# Patient Record
Sex: FEMALE | Race: WHITE | NOT HISPANIC OR LATINO | Employment: OTHER | URBAN - NONMETROPOLITAN AREA
[De-identification: names, ages, dates, MRNs, and addresses within clinical notes are randomized per-mention and may not be internally consistent; named-entity substitution may affect disease eponyms.]

---

## 2021-04-14 ENCOUNTER — HOSPITAL ENCOUNTER (OUTPATIENT)
Dept: RADIOLOGY | Facility: CLINIC | Age: 66
Discharge: HOME | End: 2021-04-14
Payer: COMMERCIAL

## 2021-04-14 ENCOUNTER — OFFICE VISIT (OUTPATIENT)
Dept: OBGYN CLINIC | Facility: CLINIC | Age: 66
End: 2021-04-14
Payer: COMMERCIAL

## 2021-04-14 VITALS
DIASTOLIC BLOOD PRESSURE: 66 MMHG | HEIGHT: 68 IN | SYSTOLIC BLOOD PRESSURE: 118 MMHG | BODY MASS INDEX: 39.71 KG/M2 | TEMPERATURE: 97.6 F | WEIGHT: 262 LBS | HEART RATE: 69 BPM

## 2021-04-14 DIAGNOSIS — G89.29 CHRONIC RIGHT SHOULDER PAIN: ICD-10-CM

## 2021-04-14 DIAGNOSIS — S46.011A ROTATOR CUFF STRAIN, RIGHT, INITIAL ENCOUNTER: ICD-10-CM

## 2021-04-14 DIAGNOSIS — M25.511 CHRONIC RIGHT SHOULDER PAIN: ICD-10-CM

## 2021-04-14 DIAGNOSIS — M75.01 ADHESIVE CAPSULITIS OF RIGHT SHOULDER: Primary | ICD-10-CM

## 2021-04-14 PROCEDURE — 99203 OFFICE O/P NEW LOW 30 MIN: CPT | Performed by: ORTHOPAEDIC SURGERY

## 2021-04-14 PROCEDURE — 73030 X-RAY EXAM OF SHOULDER: CPT

## 2021-04-14 RX ORDER — LISINOPRIL 2.5 MG/1
2.5 TABLET ORAL DAILY
COMMUNITY
End: 2021-07-02 | Stop reason: HOSPADM

## 2021-04-14 RX ORDER — ATENOLOL 25 MG/1
25 TABLET ORAL DAILY
COMMUNITY
End: 2021-07-02 | Stop reason: HOSPADM

## 2021-04-14 RX ORDER — ASPIRIN 325 MG
325 TABLET ORAL
COMMUNITY
End: 2021-07-02 | Stop reason: HOSPADM

## 2021-04-14 RX ORDER — NAPROXEN 500 MG/1
500 TABLET ORAL 2 TIMES DAILY WITH MEALS
Qty: 60 TABLET | Refills: 1 | Status: SHIPPED | OUTPATIENT
Start: 2021-04-14 | End: 2021-06-22 | Stop reason: ALTCHOICE

## 2021-04-14 RX ORDER — ALBUTEROL SULFATE 90 UG/1
2 AEROSOL, METERED RESPIRATORY (INHALATION) 4 TIMES DAILY
COMMUNITY
Start: 2021-03-16

## 2021-04-14 NOTE — PROGRESS NOTES
ASSESSMENT/PLAN:    Diagnoses and all orders for this visit:    Adhesive capsulitis of right shoulder  -     XR shoulder 2+ vw right; Future  -     Ambulatory referral to Physical Therapy; Future  -     naproxen (NAPROSYN) 500 mg tablet; Take 1 tablet (500 mg total) by mouth 2 (two) times a day with meals    Chronic right shoulder pain  -     Ambulatory referral to Physical Therapy; Future    Other orders  -     albuterol (PROVENTIL HFA,VENTOLIN HFA) 90 mcg/act inhaler; Inhale 2 puffs  -     aspirin 325 mg tablet; Take 325 mg by mouth  -     lisinopril (ZESTRIL) 2 5 mg tablet; Take 2 5 mg by mouth daily  -     atenolol (TENORMIN) 25 mg tablet; Take 25 mg by mouth daily      Reviewed x-ray findings and physical exam findings with patient, and informed her that today's findings are consistent with adhesive capsulitis of the right shoulder  She has been provided a referral to formal physical therapy to address pain, strength, and range of motion deficits  She has also been given a prescription for naproxen for relief of pain and inflammation  Educated patient that this particular pathology can be particularly uncomfortable, and some degree of pain as expected with therapy  She can continue ice, and home stretching program as tolerated  She can also continue activity as tolerated on the right upper extremity  She will be seen for follow-up in 4 weeks for strength and range of motion check     _____________________________________________________  CHIEF COMPLAINT:  Chief Complaint   Patient presents with    Right Shoulder - Pain         SUBJECTIVE:  Arnaldo Adkins is a 77y o  year old ambidextrous female who presents  For evaluation of right shoulder injury sustained 2/5/2021    Patient states that she slipped on ice outside of her home on the morning of February 5th, and was trying to right herself by pulling herself up on brick work outside of her home, when she felt a pop in the anterior aspect of her right shoulder  She reports having some mild swelling, difficulty with overhead motion initially following the incident  She denies any bruising, swelling, numbness, or tingling  She manage her symptoms at home, thinking that they would resolve on their own over time, but when they did not resolve she was seen by her primary care provider in regards to this issue  On today's presentation she complains of both anterior and deep right shoulder pain, that she describes as achy at rest but sharp with certain motions  Her pain is most bothersome with attempting overhead motion, or when reaching behind her back  She notes that she is no longer able to elevate her arm above her head, and has to use her opposite arm to assist with achieving overhead motion in the right upper extremity  She denies any current bruising, swelling, numbness, tingling, feelings of instability, or mechanical symptoms       PAST MEDICAL HISTORY:  Past Medical History:   Diagnosis Date    COPD (chronic obstructive pulmonary disease) ([facility] [address] )     High cholesterol        PAST SURGICAL HISTORY:  Past Surgical History:   Procedure Laterality Date    APPENDECTOMY         FAMILY HISTORY:  Family History   Problem Relation Age of Onset    No Known Problems Mother     No Known Problems Father        SOCIAL HISTORY:  Social History     Tobacco Use    Smoking status: Current Every Day Smoker     Packs/day: 0 50     Types: Cigarettes    Smokeless tobacco: Never Used    Tobacco comment: 30 pk yr hx   Substance Use Topics    Alcohol use: Yes     Comment: occasionally    Drug use: Never       MEDICATIONS:    Current Outpatient Medications:     albuterol (PROVENTIL HFA,VENTOLIN HFA) 90 mcg/act inhaler, Inhale 2 puffs, Disp: , Rfl:     aspirin 325 mg tablet, Take 325 mg by mouth, Disp: , Rfl:     atenolol (TENORMIN) 25 mg tablet, Take 25 mg by mouth daily, Disp: , Rfl:     lisinopril (ZESTRIL) 2 5 mg tablet, Take 2 5 mg by mouth daily, Disp: , Rfl:    naproxen (NAPROSYN) 500 mg tablet, Take 1 tablet (500 mg total) by mouth 2 (two) times a day with meals, Disp: 60 tablet, Rfl: 1    ALLERGIES:  No Known Allergies    Review of systems:   Constitutional: Negative for fatigue, fever or loss of apetite  HENT: Negative  Respiratory: Negative for shortness of breath, dyspnea  Cardiovascular: Negative for chest pain/tightness  Gastrointestinal: Negative for abdominal pain, N/V  Endocrine: Negative for cold/heat intolerance, unexplained weight loss/gain  Genitourinary: Negative for flank pain, dysuria, hematuria  Musculoskeletal: As noted in HPI   Skin: Negative for rash  Neurological:  Negative for numbness, tingling, or paresthesias  Psychiatric/Behavioral: Negative for agitation  _____________________________________________________  PHYSICAL EXAMINATION:    Blood pressure 118/66, pulse 69, temperature 97 6 °F (36 4 °C), temperature source Temporal, height 5' 8" (1 727 m), weight 119 kg (262 lb)  General: well developed and well nourished, alert, oriented times 3 and appears comfortable  Psychiatric: Normal  HEENT: Benign  Cardiovascular:  Normal rate    Pulmonary: No wheezing or stridor  Abdomen: Soft, Nontender  Skin: No masses, erythema, lacerations, fluctation, ulcerations  Neurovascular:  Palpable distal pulses, DTRs intact    MUSCULOSKELETAL EXAMINATION:  Right shoulder -     No obvious anatomical deformity  Skin is warm and dry to touch with no signs of erythema, ecchymosis, or infection   no soft tissue swelling or effusion noted   minimal palpable crepitus with passive motion   mildly TTP over anterior capsule   Non-tender over subacromial space, AC joint, or posterior capsule   Passive ROM  FF 60°, ABD  30°, ER  50°, IR  Left PSIS   Active ROM FF  80° with shoulder shrug, ABD  80° with shoulder shrug  MMT  4/5 external rotation, 4/5 internal rotation, 4/5 shoulder abduction   no glenohumeral instability appreciated on exam  -  Hook Sign  - [place] deformity  She had no complaints of any change in her baseline pain with Leroy's, Pryor's, speed's testing  Demonstrates normal elbow, wrist, and finger motion  2+ distal radial pulse with brisk capillary refill to the fingers  Radial, median, and ulnar motor and sensory distributions intact  Sensation light touch intact distally    _____________________________________________________  STUDIES REVIEWED:   Attending Physician has personally reviewed pertinent imaging in PACS, impression is as follows:    Review of radiographic series taken  4/14/2021 of the  Right shoulder shows  Mild to moderate AC joint osteoarthritis  There is no significant degenerative change noted in the glenohumeral joint        Scribe Attestation    I,:  Cassandra Bautista am acting as a scribe while in the presence of the attending physician :       I,:  Ambrosio Salazar personally performed the services described in this documentation    as scribed in my presence :

## 2021-04-20 ENCOUNTER — EVALUATION (OUTPATIENT)
Dept: PHYSICAL THERAPY | Facility: CLINIC | Age: 66
End: 2021-04-20
Payer: COMMERCIAL

## 2021-04-20 DIAGNOSIS — M25.511 CHRONIC RIGHT SHOULDER PAIN: ICD-10-CM

## 2021-04-20 DIAGNOSIS — M75.01 ADHESIVE CAPSULITIS OF RIGHT SHOULDER: ICD-10-CM

## 2021-04-20 DIAGNOSIS — G89.29 CHRONIC RIGHT SHOULDER PAIN: ICD-10-CM

## 2021-04-20 PROCEDURE — 97535 SELF CARE MNGMENT TRAINING: CPT | Performed by: PHYSICAL THERAPIST

## 2021-04-20 PROCEDURE — 97162 PT EVAL MOD COMPLEX 30 MIN: CPT | Performed by: PHYSICAL THERAPIST

## 2021-04-20 NOTE — PROGRESS NOTES
PT Evaluation   Today's date: 2021  Patient name: Francie Santamaria  : 1955  MRN: [id number]  Referring provider: Mery Chaudhary DO  Dx:   Encounter Diagnosis     ICD-10-CM    1  Chronic right shoulder pain  M25 511 Ambulatory referral to Physical Therapy    G89 29    2  Adhesive capsulitis of right shoulder  M75 01 Ambulatory referral to Physical Therapy     Assessment  Assessment details:  2021  Raymond Villanueva states she was at home and she got up early in the morning  She slipped and fell on the ice outside at 5:30 AM   She had gone outside to get the newspaper  She injured her right shoulder on impact  She developed pain with her humeral region  Impairments: abnormal or restricted ROM, abnormal movement, activity intolerance, impaired physical strength, lacks appropriate home exercise program, pain with function, safety issue, scapular dyskinesis and weight-bearing intolerance  Understanding of Dx/Px/POC: excellent   Prognosis: good    Goals  STG 2-4 weeks:   Increase UE strength by 3-6 lbs  Decrease pain by 1-2 levels on 1-10 pain scale  Increase UE PROM by 10-15 Degrees in all planes  Reduce C/O pain with lying on either side  Initiate HEP  LTG 6-8 weeks:   Increase UE strength 10-20 lbs  Demonstrate UE AROM WFL in all planes  Decrease pain to <2  Improve strength by 10-20 lbs  Return to lying on their right or left side with minimal difficulty  Improve sleep status limitations to none  D/C with HEP  Plan  Plan details: All planned modality interventions and planned therapy interventions are provided PRN    Patient would benefit from: PT eval and skilled physical therapy  Planned modality interventions: ultrasound, unattended electrical stimulation and TENS  Planned therapy interventions: joint mobilization, manual therapy, neuromuscular re-education, patient education, self care, strengthening, stretching, therapeutic activities, coordination, flexibility, graded exercise, home exercise program, therapeutic exercise and therapeutic training  Frequency: 3x week  Duration in weeks: 12  Treatment plan discussed with: patient        Subjective Evaluation    Pain  Quality: discomfort, knife-like, pulling, squeezing, sharp, tight and throbbing  Relieving factors: support, rest, relaxation and change in position  Aggravating factors: overhead activity, keyboarding and lifting  Progression: worsening    Treatments  Current treatment: physical therapy  Patient Goals  Patient goals for therapy: decreased pain, increased motion, return to work, return to [employer] activities, independence with ADLs/IADLs and increased strength          Objective    Date of onset:  2/7/2021    Date of Surgery:  None    History of Present Episode: 4/20/2021  Edgar Fong states she was at home and she got up early in the morning  She slipped and fell on the ice outside at 5:30 AM   She had gone outside to get the newspaper  She injured her right shoulder on impact  She developed pain with her humeral region  Past Medical History:    4/20/2021  Edgar Fong reports COPD  Previous Level of Functional Ability:  4/20/2021  Edgar Fong states she had no issues or limitations  Inspection / Palpation:  Shoulder:  4/20/2021  Endomorphic body type  No signs of infection  No signs of wounds  No signs of drainage  No signs of ecchymotic regions  No signs of erythremic regions  Moderate signs of muscle spasm  Moderate signs of muscle guarding  Moderate signs of tenderness reported to palpation  No signs of atrophy noted  No signs of swelling  No signs of a surgery site  Current conditions appears consistent with recent acute episode  Chief Complaints:  4/20/2021  Medina reports moderate to severe difficulty with bending her right shoulder  Edgar Fong reports moderate to severe difficulty with movement of her right shoulder  Edgar Fong reports moderate to severe difficulty with use of her right arm    Edgar Fong reports severe difficulty with lifting / elevating her right arm  Jyotsna Ashby reports mild to moderate difficulty with sleeping  Jyotsna Ashby reports mild to moderate difficulty with her strength and endurance  Jyotsna Ashby reports moderate to severe limitations with her right shoulder range of motion  Jyotsna Ashby reports mild to moderate difficulty lying on her right shoulder region  SHOULDER PAIN Resting Palpation Moving Lifting Elevating   4/20/2021 Lt 0 0 0 0 0   4/20/2021 Rt  0 0 0-4 0-4 5-10     SHOULDER PAIN Sleeping Throwing Pushing Pulling Twisting   4/20/2021 Lt 0 0 0 0 0   4/20/2021 Rt  0 5-10 0-4 0-4 5-10     SHOULDER AROM Flexion Extension Abduction   4/20/2021 Lt 185° 70° 185°   4/20/2021 Rt 85° 32° 85°     SHOULDER AROM Hor Add Ext Rotation Int Rotation   4/20/2021 Lt 70° 95° 95°   4/20/2021 Rt 15° 25° 95°     SHLD MMT / PAIN Flexion Extension Abduction   4/20/2021 Lt 0/10  32 lbs 0/10  30 lbs 0/10  29 lbs   4/20/2021 Rt 5/10  14 lbs 5/10  10 lbs 5/10  9 lbs     SHLD MMT / PAIN Hor Add Ext Rotation Int Rotation   4/20/2021 Lt 0/10  28 lbs 0/10  30 lbs 0/10  34 lbs   4/20/2021 Rt 5/10  14 lbs 5/10  6 lbs 5/10  9 lbs     Shoulder Screen Rotator Cuff Apprehension Anterior  Stability Posterior  Stability   4/20/2021 Rt POSITIVE Negative Negative Negative   4/20/2021 Lt Negative Negative Negative Negative     Shoulder Screen AC Joint SC Joint Drop Arm Adhesive Capsulitis   4/20/2021 Rt Negative Negative POSITIVE POSITIVE   4/20/2021 Lt Negative Negative Negative Negative     Precautions:  Right Shoulder Humeral Pain / Right Capsulitis    All treatments below will be provided with a focus on strengthening, flexibility, ROM, postural,   endurance and any possible swelling and pain which may be present without ignoring   neural issues involving balance, coordination and proprioception which is also important   and necessary to provide full functional mobility and quality care        Daily Treatment Log  Manual  4/20       MT, ROM        HEP 15' Neuro Re-Ed 4/20       [facility]        [employer]        Power Web        Digiflex        Finger Ladder        Ther Exer        [employer]        NuStep        W/P-PNF,IR,ER,PU,PS,Throw-Top,Mid,Bot        W/P-OH        [employer] Sup/Pro        [employer] [place], [place]                Modalities 4/20       [place]  / [place] / Judd GREEN

## 2021-04-20 NOTE — LETTER
May 7, 2021  PT Evaluation Plan of [address] Mina Mckinney DO  [address]  [address] [facility] [address]    Patient: Gonzalo Winters   YOB: 1955   Date of Visit: 2021     Encounter Diagnosis     ICD-10-CM    1  Chronic right shoulder pain  M25 511 Ambulatory referral to Physical Therapy    G89 29    2  Adhesive capsulitis of right shoulder  M75 01 Ambulatory referral to Physical Therapy       Dear Dr Judy Petty:    Thank you for your recent referral of Gonzalo Winters  Please review the attached evaluation summary from [facility] recent visit  Please verify that you agree with the plan of care by signing the attached order  If you have any questions or concerns, please do not hesitate to call  I sincerely appreciate the opportunity to share in the care of one of your patients and hope to have another opportunity to work with you in the near future  Sincerely,    Mina Mckeon, PT      Referring Provider:      I certify that I have read the below Plan of Care and certify the need for these services furnished under this plan of treatment while under my care  Josephine Otto DO  [address] [address]  [place] [place]          PT Evaluation   Today's date: 2021  Patient name: Milo Santamaria  : 1955  MRN: [id number]  Referring provider: Alexx Gilliam DO  Dx:   Encounter Diagnosis     ICD-10-CM    1  Chronic right shoulder pain  M25 511 Ambulatory referral to Physical Therapy    G89 29    2  Adhesive capsulitis of right shoulder  M75 01 Ambulatory referral to Physical Therapy     Assessment  Assessment details:  2021  Hollieharis Kira states she was at home and she got up early in the morning  She slipped and fell on the ice outside at 5:30 AM   She had gone outside to get the newspaper  She injured her right shoulder on impact  She developed pain with her humeral region      Impairments: abnormal or restricted ROM, abnormal movement, activity intolerance, impaired physical strength, lacks appropriate home exercise program, pain with function, safety issue, scapular dyskinesis and weight-bearing intolerance  Understanding of Dx/Px/POC: excellent   Prognosis: good    Goals  STG 2-4 weeks:   Increase UE strength by 3-6 lbs  Decrease pain by 1-2 levels on 1-10 pain scale  Increase UE PROM by 10-15 Degrees in all planes  Reduce C/O pain with lying on either side  Initiate HEP  LTG 6-8 weeks:   Increase UE strength 10-20 lbs  Demonstrate UE AROM WFL in all planes  Decrease pain to <2  Improve strength by 10-20 lbs  Return to lying on their right or left side with minimal difficulty  Improve sleep status limitations to none  D/C with HEP  Plan  Plan details: All planned modality interventions and planned therapy interventions are provided PRN    Patient would benefit from: PT eval and skilled physical therapy  Planned modality interventions: ultrasound, unattended electrical stimulation and TENS  Planned therapy interventions: joint mobilization, manual therapy, neuromuscular re-education, patient education, self care, strengthening, stretching, therapeutic activities, coordination, flexibility, graded exercise, home exercise program, therapeutic exercise and therapeutic training  Frequency: 3x week  Duration in weeks: 12  Treatment plan discussed with: patient        Subjective Evaluation    Pain  Quality: discomfort, knife-like, pulling, squeezing, sharp, tight and throbbing  Relieving factors: support, rest, relaxation and change in position  Aggravating factors: overhead activity, keyboarding and lifting  Progression: worsening    Treatments  Current treatment: physical therapy  Patient Goals  Patient goals for therapy: decreased pain, increased motion, return to work, return to [employer] activities, independence with ADLs/IADLs and increased strength          Objective    Date of onset:  2/7/2021    Date of Surgery:  None    History of Present Episode: 4/20/2021  [facility] states she was at home and she got up early in the morning  She slipped and fell on the ice outside at 5:30 AM   She had gone outside to get the newspaper  She injured her right shoulder on impact  She developed pain with her humeral region  Past Medical History:    4/20/2021  [facility] reports COPD  Previous Level of Functional Ability:  4/20/2021  [facility] states she had no issues or limitations  Inspection / Palpation:  Shoulder:  4/20/2021  Endomorphic body type  No signs of infection  No signs of wounds  No signs of drainage  No signs of ecchymotic regions  No signs of erythremic regions  Moderate signs of muscle spasm  Moderate signs of muscle guarding  Moderate signs of tenderness reported to palpation  No signs of atrophy noted  No signs of swelling  No signs of a surgery site  Current conditions appears consistent with recent acute episode  Chief Complaints:  4/20/2021  Medina reports moderate to severe difficulty with bending her right shoulder  [facility] reports moderate to severe difficulty with movement of her right shoulder  [facility] reports moderate to severe difficulty with use of her right arm  [facility] reports severe difficulty with lifting / elevating her right arm  [facility] reports mild to moderate difficulty with sleeping  [facility] reports mild to moderate difficulty with her strength and endurance  [facility] reports moderate to severe limitations with her right shoulder range of motion  [facility] reports mild to moderate difficulty lying on her right shoulder region      SHOULDER PAIN Resting Palpation Moving Lifting Elevating   4/20/2021 Lt 0 0 0 0 0   4/20/2021 Rt  0 0 0-4 0-4 5-10     SHOULDER PAIN Sleeping Throwing Pushing Pulling Twisting   4/20/2021 Lt 0 0 0 0 0   4/20/2021 Rt  0 5-10 0-4 0-4 5-10     SHOULDER AROM Flexion Extension Abduction   4/20/2021 Lt 185° 70° 185°   4/20/2021 Rt 85° 32° 85°     SHOULDER AROM Hor Add Ext Rotation Int Rotation   2021 Lt 70° 95° 95°   2021 Rt 15° 25° 95°     SHLD MMT / PAIN Flexion Extension Abduction   2021 Lt 0/10  32 lbs 0/10  30 lbs 0/10  29 lbs   2021 Rt 5/10  14 lbs 5/10  10 lbs 5/10  9 lbs     SHLD MMT / PAIN Hor Add Ext Rotation Int Rotation   2021 Lt 0/10  28 lbs 0/10  30 lbs 0/10  34 lbs   2021 Rt 510  14 lbs 5/10  6 lbs 5/10  9 lbs     Shoulder Screen Rotator Cuff Apprehension Anterior  Stability Posterior  Stability   2021 Rt POSITIVE Negative Negative Negative   2021 Lt Negative Negative Negative Negative     Shoulder Screen AC Joint SC Joint Drop Arm Adhesive Capsulitis   2021 Rt Negative Negative POSITIVE POSITIVE   2021 Lt Negative Negative Negative Negative     Precautions:  Right Shoulder Humeral Pain / Right Capsulitis    All treatments below will be provided with a focus on strengthening, flexibility, ROM, postural,   endurance and any possible swelling and pain which may be present without ignoring   neural issues involving balance, coordination and proprioception which is also important   and necessary to provide full functional mobility and quality care  Daily Treatment Log  Manual         MT, ROM        HEP 15'       Neuro Re-Ed        [facility]        [facility]        [facility]        Power Web        Digiflex        Finger Ladder        Ther Exer        [employer]ain[employer]-BP,PD,Lats,Row        NuStep        W/P-PNF,IR,ER,PU,PS,Throw-Top,Mid,Bot        W/P-OH        [employer] Sup/Pro        [employer] [place], [place]                Modalities        [address] / [place] / [place]        US            PT Discharge  Today's date: 2021  Patient name: Silvia Santamaria  : 1955  MRN: [id number]  Referring provider: Bhumika Norman DO  Dx:   Encounter Diagnosis     ICD-10-CM    1  Chronic right shoulder pain  M25 511 Ambulatory referral to Physical Therapy    G89 29 PT plan of care cert/re-cert   2   Adhesive capsulitis of right shoulder  M75 01 Ambulatory referral to Physical Therapy     PT plan of care cert/re-cert     Assessment/Plan    Subjective    Objective     5/7/2021  Medina Marmolejo has not returned for more treatment  Manjeet Kaplan did not attend today's appointment  I cannot provide you with a current progress report but I can provide you with information based on previous performance  Manjeet Kaplan is discharged at this time

## 2021-04-27 ENCOUNTER — APPOINTMENT (OUTPATIENT)
Dept: PHYSICAL THERAPY | Facility: CLINIC | Age: 66
End: 2021-04-27
Payer: COMMERCIAL

## 2021-05-07 NOTE — PROGRESS NOTES
PT Discharge  Today's date: 2021  Patient name: Valerie Santamaria  : 1955  MRN: [id number]  Referring provider: Rashawn Willett DO  Dx:   Encounter Diagnosis     ICD-10-CM    1  Chronic right shoulder pain  M25 511 Ambulatory referral to Physical Therapy    G89 29 PT plan of care cert/re-cert   2  Adhesive capsulitis of right shoulder  M75 01 Ambulatory referral to Physical Therapy     PT plan of care cert/re-cert     Assessment/Plan    Subjective    Objective     2021  Medina Sharp has not returned for more treatment  Heavenly Mckeon did not attend today's appointment  I cannot provide you with a current progress report but I can provide you with information based on previous performance  Humbertodavid Mckeon is discharged at this time

## 2021-05-12 ENCOUNTER — OFFICE VISIT (OUTPATIENT)
Dept: OBGYN CLINIC | Facility: CLINIC | Age: 66
End: 2021-05-12
Payer: COMMERCIAL

## 2021-05-12 VITALS
RESPIRATION RATE: 20 BRPM | HEART RATE: 72 BPM | BODY MASS INDEX: 39.71 KG/M2 | TEMPERATURE: 97.4 F | SYSTOLIC BLOOD PRESSURE: 132 MMHG | WEIGHT: 262 LBS | DIASTOLIC BLOOD PRESSURE: 78 MMHG | HEIGHT: 68 IN

## 2021-05-12 DIAGNOSIS — M75.01 ADHESIVE CAPSULITIS OF RIGHT SHOULDER: Primary | ICD-10-CM

## 2021-05-12 PROCEDURE — 99213 OFFICE O/P EST LOW 20 MIN: CPT | Performed by: ORTHOPAEDIC SURGERY

## 2021-05-12 NOTE — PROGRESS NOTES
ASSESSMENT/PLAN:    Diagnoses and all orders for this visit:    Adhesive capsulitis of right shoulder        Wesly Hurtado will continue home exercises  She may also continue naproxen as needed for pain control  She was offered right shoulder CSI but declined, stating she would rather continue working on home exercises  We will see her back on an as needed basis  She was encouraged to contact the office should questions or concerns arise  Return if symptoms worsen or fail to improve       _____________________________________________________  CHIEF COMPLAINT:  Chief Complaint   Patient presents with    Right Shoulder - Follow-up    Shoulder Injury         SUBJECTIVE:  Renetta Hobson is a 77 y o  female who presents for follow up of right shoulder adhesive capsulitis  She reports minimal improvement in the past 4 weeks  She only went to an evaluation with therapy and was told that she would be required to pay $100 per visit  She is unable to afford this and was provided 3 pages of home exercises by Jono  She has been performing these exercises daily  She has been taking the naproxen routinely and also notes relief  She denies numbness/tingling  Denies new injuries or trauma         PAST MEDICAL HISTORY:  Past Medical History:   Diagnosis Date    COPD (chronic obstructive pulmonary disease) ([facility] [address] )     High cholesterol        PAST SURGICAL HISTORY:  Past Surgical History:   Procedure Laterality Date    APPENDECTOMY         FAMILY HISTORY:  Family History   Problem Relation Age of Onset    No Known Problems Mother     No Known Problems Father        SOCIAL HISTORY:  Social History     Tobacco Use    Smoking status: Current Every Day Smoker     Packs/day: 0 50     Types: Cigarettes    Smokeless tobacco: Never Used    Tobacco comment: 30 pk yr hx   Substance Use Topics    Alcohol use: Yes     Comment: occasionally    Drug use: Never       MEDICATIONS:    Current Outpatient Medications:     albuterol (PROVENTIL HFA,VENTOLIN HFA) 90 mcg/act inhaler, Inhale 2 puffs, Disp: , Rfl:     aspirin 325 mg tablet, Take 325 mg by mouth, Disp: , Rfl:     atenolol (TENORMIN) 25 mg tablet, Take 25 mg by mouth daily, Disp: , Rfl:     lisinopril (ZESTRIL) 2 5 mg tablet, Take 2 5 mg by mouth daily, Disp: , Rfl:     naproxen (NAPROSYN) 500 mg tablet, Take 1 tablet (500 mg total) by mouth 2 (two) times a day with meals, Disp: 60 tablet, Rfl: 1    ALLERGIES:  No Known Allergies    REVIEW OF SYSTEMS:  Pertinent items are noted in HPI  A comprehensive review of systems was negative       _____________________________________________________  PHYSICAL EXAMINATION:  General: well developed and well nourished, alert, oriented times 3 and appears comfortable  Psychiatric: Normal  HEENT:  Normocephalic, atraumatic  Cardiovascular:  Regular  Pulmonary: No wheezing or stridor  Skin: No masses, erthema, lacerations, fluctation, ulcerations  Neurovascular: Motor and sensory exams are grossly intact  +2 Radial pulse  Limb is warm and well perfused with good color and capillary refill of the digits  MUSCULOSKELETAL EXAMINATION:    The right shoulder exam demonstrates skin intact, no erythema, no ecchymosis  No tenderness to palpation  She has active FF to 90 degrees, active abduction to 80 degrees, internal rotatio to her right buttock, and ER to 60 degrees  4/5 strength with resisted FF, IR, and ER  Biceps provocation testing does not elicit any complaints  The remainder of the right upper extremity exam is benign  _____________________________________________________  STUDIES REVIEWED:  No new imaging performed today    PROCEDURES PERFORMED:   Procedures  None performed today            Mendy Lopez PA-C

## 2021-06-22 ENCOUNTER — APPOINTMENT (EMERGENCY)
Dept: RADIOLOGY | Facility: HOSPITAL | Age: 66
DRG: 644 | End: 2021-06-22
Payer: COMMERCIAL

## 2021-06-22 ENCOUNTER — APPOINTMENT (INPATIENT)
Dept: ULTRASOUND IMAGING | Facility: HOSPITAL | Age: 66
DRG: 644 | End: 2021-06-22
Payer: COMMERCIAL

## 2021-06-22 ENCOUNTER — HOSPITAL ENCOUNTER (INPATIENT)
Facility: HOSPITAL | Age: 66
LOS: 10 days | Discharge: HOME | DRG: 644 | End: 2021-07-02
Attending: STUDENT IN AN ORGANIZED HEALTH CARE EDUCATION/TRAINING PROGRAM | Admitting: FAMILY MEDICINE
Payer: COMMERCIAL

## 2021-06-22 DIAGNOSIS — R79.89 ELEVATED BRAIN NATRIURETIC PEPTIDE (BNP) LEVEL: ICD-10-CM

## 2021-06-22 DIAGNOSIS — D72.829 LEUKOCYTOSIS: ICD-10-CM

## 2021-06-22 DIAGNOSIS — R60.0 BILATERAL LOWER EXTREMITY EDEMA: ICD-10-CM

## 2021-06-22 DIAGNOSIS — E05.90 HYPERTHYROIDISM: ICD-10-CM

## 2021-06-22 DIAGNOSIS — R79.89 LOW TSH LEVEL: ICD-10-CM

## 2021-06-22 DIAGNOSIS — I48.91 NEW ONSET A-FIB (HCC): ICD-10-CM

## 2021-06-22 DIAGNOSIS — N17.9 AKI (ACUTE KIDNEY INJURY) (HCC): ICD-10-CM

## 2021-06-22 DIAGNOSIS — I48.91 ATRIAL FIBRILLATION WITH RAPID VENTRICULAR RESPONSE (HCC): Primary | ICD-10-CM

## 2021-06-22 DIAGNOSIS — R60.0 BILATERAL LEG EDEMA: ICD-10-CM

## 2021-06-22 PROBLEM — E87.29 INCREASED ANION GAP METABOLIC ACIDOSIS: Status: ACTIVE | Noted: 2021-06-22

## 2021-06-22 PROBLEM — E87.2 INCREASED ANION GAP METABOLIC ACIDOSIS: Status: ACTIVE | Noted: 2021-06-22

## 2021-06-22 PROBLEM — Z72.0 TOBACCO ABUSE: Status: ACTIVE | Noted: 2021-06-22

## 2021-06-22 LAB
ALBUMIN SERPL BCP-MCNC: 2.8 G/DL (ref 3.5–5)
ALBUMIN SERPL BCP-MCNC: 3.1 G/DL (ref 3.5–5)
ALP SERPL-CCNC: 70 U/L (ref 46–116)
ALP SERPL-CCNC: 72 U/L (ref 46–116)
ALT SERPL W P-5'-P-CCNC: 13 U/L (ref 12–78)
ALT SERPL W P-5'-P-CCNC: 15 U/L (ref 12–78)
ANION GAP SERPL CALCULATED.3IONS-SCNC: 15 MMOL/L (ref 4–13)
ANION GAP SERPL CALCULATED.3IONS-SCNC: 16 MMOL/L (ref 4–13)
APTT PPP: 34 SECONDS (ref 23–37)
AST SERPL W P-5'-P-CCNC: 11 U/L (ref 5–45)
AST SERPL W P-5'-P-CCNC: 9 U/L (ref 5–45)
BASOPHILS # BLD AUTO: 0.04 THOUSANDS/ΜL (ref 0–0.1)
BASOPHILS NFR BLD AUTO: 0 % (ref 0–1)
BILIRUB SERPL-MCNC: 0.79 MG/DL (ref 0.2–1)
BILIRUB SERPL-MCNC: 0.85 MG/DL (ref 0.2–1)
BUN SERPL-MCNC: 48 MG/DL (ref 5–25)
BUN SERPL-MCNC: 54 MG/DL (ref 5–25)
CALCIUM ALBUM COR SERPL-MCNC: 10 MG/DL (ref 8.3–10.1)
CALCIUM ALBUM COR SERPL-MCNC: 10.2 MG/DL (ref 8.3–10.1)
CALCIUM SERPL-MCNC: 9 MG/DL (ref 8.3–10.1)
CALCIUM SERPL-MCNC: 9.5 MG/DL (ref 8.3–10.1)
CHLORIDE SERPL-SCNC: 107 MMOL/L (ref 100–108)
CHLORIDE SERPL-SCNC: 107 MMOL/L (ref 100–108)
CO2 SERPL-SCNC: 17 MMOL/L (ref 21–32)
CO2 SERPL-SCNC: 19 MMOL/L (ref 21–32)
CREAT SERPL-MCNC: 1.52 MG/DL (ref 0.6–1.3)
CREAT SERPL-MCNC: 1.8 MG/DL (ref 0.6–1.3)
EOSINOPHIL # BLD AUTO: 0 THOUSAND/ΜL (ref 0–0.61)
EOSINOPHIL NFR BLD AUTO: 0 % (ref 0–6)
ERYTHROCYTE [DISTWIDTH] IN BLOOD BY AUTOMATED COUNT: 13.8 % (ref 11.6–15.1)
GFR SERPL CREATININE-BSD FRML MDRD: 29 ML/MIN/1.73SQ M
GFR SERPL CREATININE-BSD FRML MDRD: 35 ML/MIN/1.73SQ M
GLUCOSE SERPL-MCNC: 124 MG/DL (ref 65–140)
GLUCOSE SERPL-MCNC: 136 MG/DL (ref 65–140)
HCT VFR BLD AUTO: 40.2 % (ref 34.8–46.1)
HGB BLD-MCNC: 13.2 G/DL (ref 11.5–15.4)
IMM GRANULOCYTES # BLD AUTO: 0.08 THOUSAND/UL (ref 0–0.2)
IMM GRANULOCYTES NFR BLD AUTO: 1 % (ref 0–2)
INR PPP: 2.38 (ref 0.84–1.19)
LACTATE SERPL-SCNC: 1.8 MMOL/L (ref 0.5–2)
LYMPHOCYTES # BLD AUTO: 1.42 THOUSANDS/ΜL (ref 0.6–4.47)
LYMPHOCYTES NFR BLD AUTO: 10 % (ref 14–44)
MAGNESIUM SERPL-MCNC: 1.9 MG/DL (ref 1.6–2.6)
MCH RBC QN AUTO: 30.6 PG (ref 26.8–34.3)
MCHC RBC AUTO-ENTMCNC: 32.8 G/DL (ref 31.4–37.4)
MCV RBC AUTO: 93 FL (ref 82–98)
MONOCYTES # BLD AUTO: 1.29 THOUSAND/ΜL (ref 0.17–1.22)
MONOCYTES NFR BLD AUTO: 9 % (ref 4–12)
NEUTROPHILS # BLD AUTO: 11.55 THOUSANDS/ΜL (ref 1.85–7.62)
NEUTS SEG NFR BLD AUTO: 80 % (ref 43–75)
NRBC BLD AUTO-RTO: 0 /100 WBCS
NT-PROBNP SERPL-MCNC: ABNORMAL PG/ML
PLATELET # BLD AUTO: 194 THOUSANDS/UL (ref 149–390)
PMV BLD AUTO: 10.7 FL (ref 8.9–12.7)
POTASSIUM SERPL-SCNC: 4 MMOL/L (ref 3.5–5.3)
POTASSIUM SERPL-SCNC: 4.3 MMOL/L (ref 3.5–5.3)
PROT SERPL-MCNC: 6.6 G/DL (ref 6.4–8.2)
PROT SERPL-MCNC: 7.1 G/DL (ref 6.4–8.2)
PROTHROMBIN TIME: 25.4 SECONDS (ref 11.6–14.5)
RBC # BLD AUTO: 4.31 MILLION/UL (ref 3.81–5.12)
SODIUM SERPL-SCNC: 139 MMOL/L (ref 136–145)
SODIUM SERPL-SCNC: 142 MMOL/L (ref 136–145)
TROPONIN I SERPL-MCNC: <0.02 NG/ML
WBC # BLD AUTO: 14.38 THOUSAND/UL (ref 4.31–10.16)

## 2021-06-22 PROCEDURE — 99285 EMERGENCY DEPT VISIT HI MDM: CPT

## 2021-06-22 PROCEDURE — 96376 TX/PRO/DX INJ SAME DRUG ADON: CPT

## 2021-06-22 PROCEDURE — 83735 ASSAY OF MAGNESIUM: CPT | Performed by: FAMILY MEDICINE

## 2021-06-22 PROCEDURE — 85610 PROTHROMBIN TIME: CPT | Performed by: PHYSICIAN ASSISTANT

## 2021-06-22 PROCEDURE — 99223 1ST HOSP IP/OBS HIGH 75: CPT | Performed by: FAMILY MEDICINE

## 2021-06-22 PROCEDURE — 83880 ASSAY OF NATRIURETIC PEPTIDE: CPT | Performed by: PHYSICIAN ASSISTANT

## 2021-06-22 PROCEDURE — 96361 HYDRATE IV INFUSION ADD-ON: CPT

## 2021-06-22 PROCEDURE — 84484 ASSAY OF TROPONIN QUANT: CPT | Performed by: PHYSICIAN ASSISTANT

## 2021-06-22 PROCEDURE — 36415 COLL VENOUS BLD VENIPUNCTURE: CPT | Performed by: PHYSICIAN ASSISTANT

## 2021-06-22 PROCEDURE — 71045 X-RAY EXAM CHEST 1 VIEW: CPT

## 2021-06-22 PROCEDURE — 85025 COMPLETE CBC W/AUTO DIFF WBC: CPT | Performed by: PHYSICIAN ASSISTANT

## 2021-06-22 PROCEDURE — 76536 US EXAM OF HEAD AND NECK: CPT

## 2021-06-22 PROCEDURE — 96375 TX/PRO/DX INJ NEW DRUG ADDON: CPT

## 2021-06-22 PROCEDURE — 99285 EMERGENCY DEPT VISIT HI MDM: CPT | Performed by: PHYSICIAN ASSISTANT

## 2021-06-22 PROCEDURE — 96374 THER/PROPH/DIAG INJ IV PUSH: CPT

## 2021-06-22 PROCEDURE — 85730 THROMBOPLASTIN TIME PARTIAL: CPT | Performed by: PHYSICIAN ASSISTANT

## 2021-06-22 PROCEDURE — 83605 ASSAY OF LACTIC ACID: CPT | Performed by: PHYSICIAN ASSISTANT

## 2021-06-22 PROCEDURE — 93005 ELECTROCARDIOGRAM TRACING: CPT

## 2021-06-22 PROCEDURE — 80053 COMPREHEN METABOLIC PANEL: CPT | Performed by: PHYSICIAN ASSISTANT

## 2021-06-22 PROCEDURE — 80053 COMPREHEN METABOLIC PANEL: CPT | Performed by: FAMILY MEDICINE

## 2021-06-22 RX ORDER — METOPROLOL TARTRATE 5 MG/5ML
5 INJECTION INTRAVENOUS ONCE
Status: COMPLETED | OUTPATIENT
Start: 2021-06-22 | End: 2021-06-22

## 2021-06-22 RX ORDER — DOCUSATE SODIUM 100 MG/1
100 CAPSULE, LIQUID FILLED ORAL 2 TIMES DAILY
Status: DISCONTINUED | OUTPATIENT
Start: 2021-06-22 | End: 2021-07-02 | Stop reason: HOSPADM

## 2021-06-22 RX ORDER — ONDANSETRON 2 MG/ML
4 INJECTION INTRAMUSCULAR; INTRAVENOUS EVERY 6 HOURS PRN
Status: DISCONTINUED | OUTPATIENT
Start: 2021-06-22 | End: 2021-07-02 | Stop reason: HOSPADM

## 2021-06-22 RX ORDER — SODIUM BICARBONATE 650 MG/1
650 TABLET ORAL ONCE
Status: COMPLETED | OUTPATIENT
Start: 2021-06-22 | End: 2021-06-22

## 2021-06-22 RX ORDER — DIGOXIN 0.25 MG/ML
250 INJECTION INTRAMUSCULAR; INTRAVENOUS ONCE
Status: COMPLETED | OUTPATIENT
Start: 2021-06-22 | End: 2021-06-22

## 2021-06-22 RX ORDER — FLUTICASONE PROPIONATE 110 UG/1
2 AEROSOL, METERED RESPIRATORY (INHALATION) 2 TIMES DAILY
COMMUNITY
Start: 2021-06-21

## 2021-06-22 RX ORDER — METOPROLOL TARTRATE 5 MG/5ML
5 INJECTION INTRAVENOUS EVERY 6 HOURS PRN
Status: DISCONTINUED | OUTPATIENT
Start: 2021-06-22 | End: 2021-07-02 | Stop reason: HOSPADM

## 2021-06-22 RX ORDER — METOPROLOL TARTRATE 5 MG/5ML
5 INJECTION INTRAVENOUS
Status: DISCONTINUED | OUTPATIENT
Start: 2021-06-22 | End: 2021-06-22

## 2021-06-22 RX ORDER — METHIMAZOLE 5 MG/1
5 TABLET ORAL DAILY
Status: DISCONTINUED | OUTPATIENT
Start: 2021-06-22 | End: 2021-06-28

## 2021-06-22 RX ORDER — ALPRAZOLAM 0.5 MG/1
0.5 TABLET ORAL 3 TIMES DAILY PRN
Status: DISCONTINUED | OUTPATIENT
Start: 2021-06-22 | End: 2021-07-02 | Stop reason: HOSPADM

## 2021-06-22 RX ORDER — METOPROLOL SUCCINATE 50 MG/1
50 TABLET, EXTENDED RELEASE ORAL
COMMUNITY
Start: 2021-06-21 | End: 2021-07-02 | Stop reason: HOSPADM

## 2021-06-22 RX ORDER — FLUTICASONE PROPIONATE 110 UG/1
2 AEROSOL, METERED RESPIRATORY (INHALATION) EVERY 12 HOURS SCHEDULED
Status: DISCONTINUED | OUTPATIENT
Start: 2021-06-22 | End: 2021-07-02 | Stop reason: HOSPADM

## 2021-06-22 RX ORDER — NICOTINE 21 MG/24HR
1 PATCH, TRANSDERMAL 24 HOURS TRANSDERMAL DAILY
Status: DISCONTINUED | OUTPATIENT
Start: 2021-06-22 | End: 2021-07-02 | Stop reason: HOSPADM

## 2021-06-22 RX ORDER — METOPROLOL TARTRATE 5 MG/5ML
5 INJECTION INTRAVENOUS EVERY 6 HOURS PRN
Status: DISCONTINUED | OUTPATIENT
Start: 2021-06-22 | End: 2021-06-22

## 2021-06-22 RX ORDER — ALBUTEROL SULFATE 90 UG/1
2 AEROSOL, METERED RESPIRATORY (INHALATION) EVERY 4 HOURS PRN
Status: DISCONTINUED | OUTPATIENT
Start: 2021-06-22 | End: 2021-07-02 | Stop reason: HOSPADM

## 2021-06-22 RX ORDER — LISINOPRIL 2.5 MG/1
2.5 TABLET ORAL DAILY
Status: DISCONTINUED | OUTPATIENT
Start: 2021-06-22 | End: 2021-06-22

## 2021-06-22 RX ADMIN — DOCUSATE SODIUM 100 MG: 100 CAPSULE, LIQUID FILLED ORAL at 17:27

## 2021-06-22 RX ADMIN — METOROPROLOL TARTRATE 5 MG: 5 INJECTION, SOLUTION INTRAVENOUS at 21:22

## 2021-06-22 RX ADMIN — Medication 1 PATCH: at 15:49

## 2021-06-22 RX ADMIN — DILTIAZEM HYDROCHLORIDE 15 MG/HR: 5 INJECTION INTRAVENOUS at 16:02

## 2021-06-22 RX ADMIN — METOROPROLOL TARTRATE 5 MG: 5 INJECTION, SOLUTION INTRAVENOUS at 12:44

## 2021-06-22 RX ADMIN — SODIUM CHLORIDE 1000 ML: 0.9 INJECTION, SOLUTION INTRAVENOUS at 11:57

## 2021-06-22 RX ADMIN — METOPROLOL TARTRATE 25 MG: 25 TABLET, FILM COATED ORAL at 22:24

## 2021-06-22 RX ADMIN — METOROPROLOL TARTRATE 5 MG: 5 INJECTION, SOLUTION INTRAVENOUS at 15:49

## 2021-06-22 RX ADMIN — DILTIAZEM HYDROCHLORIDE 15 MG/HR: 5 INJECTION INTRAVENOUS at 22:24

## 2021-06-22 RX ADMIN — FLUTICASONE PROPIONATE 2 PUFF: 110 AEROSOL, METERED RESPIRATORY (INHALATION) at 21:21

## 2021-06-22 RX ADMIN — DIGOXIN 250 MCG: 0.25 INJECTION INTRAMUSCULAR; INTRAVENOUS at 11:32

## 2021-06-22 RX ADMIN — DILTIAZEM HYDROCHLORIDE 5 MG/HR: 5 INJECTION INTRAVENOUS at 14:05

## 2021-06-22 RX ADMIN — METOROPROLOL TARTRATE 5 MG: 5 INJECTION, SOLUTION INTRAVENOUS at 10:47

## 2021-06-22 RX ADMIN — Medication 400 MG: at 17:27

## 2021-06-22 RX ADMIN — METOPROLOL TARTRATE 25 MG: 25 TABLET, FILM COATED ORAL at 17:27

## 2021-06-22 RX ADMIN — METHIMAZOLE 5 MG: 5 TABLET ORAL at 16:31

## 2021-06-22 RX ADMIN — METOROPROLOL TARTRATE 5 MG: 5 INJECTION, SOLUTION INTRAVENOUS at 13:01

## 2021-06-22 RX ADMIN — SODIUM BICARBONATE 650 MG TABLET 650 MG: at 20:00

## 2021-06-22 RX ADMIN — DIGOXIN 250 MCG: 0.25 INJECTION INTRAMUSCULAR; INTRAVENOUS at 22:25

## 2021-06-22 NOTE — ASSESSMENT & PLAN NOTE
Baseline creatinine is 1 2  Today creatinine is 1 80  Unknown etiology  Status post hydration  Monitor renal function

## 2021-06-22 NOTE — ASSESSMENT & PLAN NOTE
Anion gap 16, bicarb is 19  Could be secondary to KAELYN  Patient does have AFib, tachypneic elevated WBC-but no known source of infection, most likely secondary to hypothyroidism  Status post IV hydration  Recheck lab  Lactic acid normal

## 2021-06-22 NOTE — ED PROVIDER NOTES
History  Chief Complaint   Patient presents with    Atrial Fibrillation     pt c/o new onset afib w/sob at pcp office visit today  Pt arrived via EMS  47-year-old female presents emergency department for evaluation of new onset AFib  Patient states she has been feeling "off" since May  States she decided to see her PCP yesterday and was found to have new onset Afib  Patient states she was also hypotensive 70/50s  She reports she was told to follow up with cardiology this morning  Patient states she saw [facility] PA-C and was sent to the ED for further evaluation  Patient had outpatient blood work yesterday noted for low TSH, elevated BNP and elevated creatine  She patient denies any chest pain or palpitations  She reports increased shortness of reports history of COPD  Patient states she has a nervous/anxious feeling  Denies any fevers or chills  She denies any cough congestion  Patient is a current everyday smoker  She denies any recent weight gain  States she is able to sleep flat without diffiuclty breathing  History provided by:  Patient  Shortness of Breath  Severity:  Mild  Onset quality:  Gradual  Timing:  Constant  Progression:  Worsening  Chronicity:  New  Relieved by:  Nothing  Worsened by: Activity  Ineffective treatments:  Inhaler  Associated symptoms: no abdominal pain, no chest pain, no claudication, no cough, no diaphoresis, no ear pain, no fever, no headaches, no hemoptysis, no neck pain, no PND, no rash, no sore throat, no sputum production, no syncope, no swollen glands, no vomiting and no wheezing        Prior to Admission Medications   Prescriptions Last Dose Informant Patient Reported? Taking?    albuterol (PROVENTIL HFA,VENTOLIN HFA) 90 mcg/act inhaler 6/22/2021 at Unknown time  Yes Yes   Sig: Inhale 2 puffs   aspirin 325 mg tablet 6/21/2021 at Unknown time  Yes Yes   Sig: Take 325 mg by mouth   atenolol (TENORMIN) 25 mg tablet 6/21/2021 at Unknown time  Yes Yes   Sig: Take 25 mg by mouth daily   fluticasone (FLOVENT HFA) 110 MCG/ACT inhaler   Yes Yes   Sig: Inhale 2 puffs   lisinopril (ZESTRIL) 2 5 mg tablet Past Week at Unknown time  Yes Yes   Sig: Take 2 5 mg by mouth daily   metoprolol succinate (TOPROL-XL) 50 mg 24 hr tablet   Yes Yes   Sig: Take 50 mg by mouth   rivaroxaban (XARELTO) 15 mg tablet   Yes Yes   Sig: Take 15 mg by mouth      Facility-Administered Medications: None       Past Medical History:   Diagnosis Date    COPD (chronic obstructive pulmonary disease) (HCC)     High cholesterol        Past Surgical History:   Procedure Laterality Date    APPENDECTOMY         Family History   Problem Relation Age of Onset    No Known Problems Mother     No Known Problems Father      I have reviewed and agree with the history as documented  E-Cigarette/Vaping    E-Cigarette Use Never User      E-Cigarette/Vaping Substances    Nicotine No     THC No     CBD No     Flavoring No     Other No     Unknown No      Social History     Tobacco Use    Smoking status: Current Every Day Smoker     Packs/day: 0 50     Types: Cigarettes    Smokeless tobacco: Never Used    Tobacco comment: 30 pk yr hx   Vaping Use    Vaping Use: Never used   Substance Use Topics    Alcohol use: Yes     Comment: occasionally    Drug use: Never       Review of Systems   Constitutional: Negative  Negative for appetite change, chills, diaphoresis, fatigue and fever  HENT: Negative  Negative for ear pain and sore throat  Eyes: Negative for visual disturbance  Respiratory: Positive for shortness of breath  Negative for cough, hemoptysis, sputum production, choking, chest tightness, wheezing and stridor  Cardiovascular: Negative  Negative for chest pain, palpitations, claudication, leg swelling, syncope and PND  Gastrointestinal: Negative  Negative for abdominal pain and vomiting  Genitourinary: Negative  Musculoskeletal: Negative  Negative for neck pain  Skin: Negative  Negative for rash  Neurological: Negative  Negative for headaches  Psychiatric/Behavioral: The patient is nervous/anxious  All other systems reviewed and are negative  Physical Exam  Physical Exam  Vitals and nursing note reviewed  Constitutional:       General: She is not in acute distress  Appearance: Normal appearance  She is obese  She is not ill-appearing, toxic-appearing or diaphoretic  HENT:      Head: Normocephalic and atraumatic  Nose: Nose normal  No congestion or rhinorrhea  Mouth/Throat:      Mouth: Mucous membranes are moist       Pharynx: Oropharynx is clear  No oropharyngeal exudate or posterior oropharyngeal erythema  Eyes:      Extraocular Movements: Extraocular movements intact  Conjunctiva/sclera: Conjunctivae normal       Pupils: Pupils are equal, round, and reactive to light  Cardiovascular:      Rate and Rhythm: Tachycardia present  Rhythm irregularly irregular  Pulmonary:      Effort: Pulmonary effort is normal  Tachypnea present  Breath sounds: Decreased breath sounds present  No wheezing, rhonchi or rales  Abdominal:      General: Abdomen is flat  Bowel sounds are normal  There is no distension  Palpations: Abdomen is soft  Tenderness: There is no abdominal tenderness  There is no right CVA tenderness, left CVA tenderness or guarding  Musculoskeletal:         General: No swelling, tenderness or deformity  Normal range of motion  Cervical back: Normal range of motion and neck supple  No tenderness  Skin:     General: Skin is warm and dry  Capillary Refill: Capillary refill takes less than 2 seconds  Coloration: Skin is not jaundiced or pale  Findings: No bruising, erythema or rash  Neurological:      General: No focal deficit present  Mental Status: She is alert and oriented to person, place, and time     Psychiatric:         Mood and Affect: Mood normal          Behavior: Behavior normal  Thought Content:  Thought content normal          Judgment: Judgment normal          Vital Signs  ED Triage Vitals   Temperature Pulse Respirations Blood Pressure SpO2   06/22/21 1025 06/22/21 1025 06/22/21 1025 06/22/21 1025 06/22/21 1024   (!) 97 2 °F (36 2 °C) (!) 145 (!) 25 127/93 98 %      Temp Source Heart Rate Source Patient Position - Orthostatic VS BP Location FiO2 (%)   06/22/21 1025 06/22/21 1025 06/22/21 1025 06/22/21 1025 --   Temporal Monitor Lying Left arm       Pain Score       06/22/21 1440       No Pain           Vitals:    06/22/21 1427 06/22/21 1438 06/22/21 1443 06/22/21 1503   BP: 115/79 115/79 132/77    Pulse: (!) 162 (!) 160 (!) 151 (!) 159   Patient Position - Orthostatic VS: Lying            Visual Acuity  Visual Acuity      Most Recent Value   L Pupil Size (mm)  3   R Pupil Size (mm)  3   L Pupil Shape  Round   R Pupil Shape  Round          ED Medications  Medications   albuterol (PROVENTIL HFA,VENTOLIN HFA) inhaler 2 puff (has no administration in time range)   fluticasone (FLOVENT HFA) 110 MCG/ACT inhaler 2 puff (has no administration in time range)   docusate sodium (COLACE) capsule 100 mg (has no administration in time range)   ondansetron (ZOFRAN) injection 4 mg (has no administration in time range)   nicotine (NICODERM CQ) 14 mg/24hr TD 24 hr patch 1 patch (1 patch Transdermal Medication Applied 6/22/21 1549)   diltiazem (CARDIZEM) 125 mg in sodium chloride 0 9 % 125 mL infusion (15 mg/hr Intravenous New Bag 6/22/21 1602)   metoprolol (LOPRESSOR) injection 5 mg (5 mg Intravenous Given 6/22/21 1549)   methimazole (TAPAZOLE) tablet 5 mg (has no administration in time range)   rivaroxaban (XARELTO) tablet 20 mg (has no administration in time range)   ALPRAZolam (XANAX) tablet 0 5 mg (has no administration in time range)   metoprolol (LOPRESSOR) injection 5 mg (5 mg Intravenous Given 6/22/21 1047)   digoxin (LANOXIN) injection 250 mcg (250 mcg Intravenous Given 6/22/21 1132)   sodium chloride 0 9 % bolus 1,000 mL (0 mL Intravenous Stopped 6/22/21 1257)   metoprolol (LOPRESSOR) injection 5 mg (5 mg Intravenous Given 6/22/21 1244)   metoprolol (LOPRESSOR) injection 5 mg (5 mg Intravenous Given 6/22/21 1301)   diltiazem (CARDIZEM) 125 mg in sodium chloride 0 9 % 125 mL infusion (0 mg/hr Intravenous Stopped 6/22/21 1545)       Diagnostic Studies  Results Reviewed     Procedure Component Value Units Date/Time    Magnesium [[id number]]  (Normal) Collected: 06/22/21 1043    Lab Status: Final result Specimen: Blood from Arm, Left Updated: 06/22/21 1514     Magnesium 1 9 mg/dL     Lactic acid, plasma [[id number]]  (Normal) Collected: 06/22/21 1128    Lab Status: Final result Specimen: Blood from Arm, Left Updated: 06/22/21 1150     LACTIC ACID 1 8 mmol/L     Narrative:      Result may be elevated if tourniquet was used during collection      NT-BNP PRO [[id number]]  (Abnormal) Collected: 06/22/21 1043    Lab Status: Final result Specimen: Blood from Arm, Left Updated: 06/22/21 1115     NT-proBNP 11,876 pg/mL     Comprehensive metabolic panel [[id number]]  (Abnormal) Collected: 06/22/21 1043    Lab Status: Final result Specimen: Blood from Arm, Left Updated: 06/22/21 1114     Sodium 142 mmol/L      Potassium 4 3 mmol/L      Chloride 107 mmol/L      CO2 19 mmol/L      ANION GAP 16 mmol/L      BUN 54 mg/dL      Creatinine 1 80 mg/dL      Glucose 124 mg/dL      Calcium 9 5 mg/dL      Corrected Calcium 10 2 mg/dL      AST 11 U/L      ALT 15 U/L      Alkaline Phosphatase 72 U/L      Total Protein 7 1 g/dL      Albumin 3 1 g/dL      Total Bilirubin 0 85 mg/dL      eGFR 29 ml/min/1 73sq m     Narrative:      Merlene guidelines for Chronic Kidney Disease (CKD):     Stage 1 with normal or high GFR (GFR > 90 mL/min/1 73 square meters)    Stage 2 Mild CKD (GFR = 60-89 mL/min/1 73 square meters)    Stage 3A Moderate CKD (GFR = 45-59 mL/min/1 73 square meters)    Stage 3B Moderate CKD (GFR = 30-44 mL/min/1 73 square meters)    Stage 4 Severe CKD (GFR = 15-29 mL/min/1 73 square meters)    Stage 5 End Stage CKD (GFR <15 mL/min/1 73 square meters)  Note: GFR calculation is accurate only with a steady state creatinine    Troponin I [[id number]]  (Normal) Collected: 06/22/21 1043    Lab Status: Final result Specimen: Blood from Arm, Left Updated: 06/22/21 1111     Troponin I <0 02 ng/mL     Protime-INR [[id number]]  (Abnormal) Collected: 06/22/21 1043    Lab Status: Final result Specimen: Blood from Arm, Left Updated: 06/22/21 1108     Protime 25 4 seconds      INR 2 38    APTT [[id number]]  (Normal) Collected: 06/22/21 1043    Lab Status: Final result Specimen: Blood from Arm, Left Updated: 06/22/21 1108     PTT 34 seconds     CBC and differential [[id number]]  (Abnormal) Collected: 06/22/21 1043    Lab Status: Final result Specimen: Blood from Arm, Left Updated: 06/22/21 1053     WBC 14 38 Thousand/uL      RBC 4 31 Million/uL      Hemoglobin 13 2 g/dL      Hematocrit 40 2 %      MCV 93 fL      MCH 30 6 pg      MCHC 32 8 g/dL      RDW 13 8 %      MPV 10 7 fL      Platelets 962 Thousands/uL      nRBC 0 /100 WBCs      Neutrophils Relative 80 %      Immat GRANS % 1 %      Lymphocytes Relative 10 %      Monocytes Relative 9 %      Eosinophils Relative 0 %      Basophils Relative 0 %      Neutrophils Absolute 11 55 Thousands/µL      Immature Grans Absolute 0 08 Thousand/uL      Lymphocytes Absolute 1 42 Thousands/µL      Monocytes Absolute 1 29 Thousand/µL      Eosinophils Absolute 0 00 Thousand/µL      Basophils Absolute 0 04 Thousands/µL                  XR chest 1 view portable    (Results Pending)   US thyroid    (Results Pending)              Procedures  ECG 12 Lead Documentation Only    Date/Time: 6/22/2021 10:21 AM  Performed by: David Hooker PA-C  Authorized by: David Hooker PA-C     Indications / Diagnosis:  Shortness of breath  Patient location:  ED  Interpretation:     Interpretation: non-specific Rate:     ECG rate:  154    ECG rate assessment: tachycardic    Rhythm:     Rhythm: atrial fibrillation    Ectopy:     Ectopy: none    QRS:     QRS axis:  Left    QRS intervals:  Normal  Conduction:     Conduction: normal    ST segments:     ST segments:  Normal  T waves:     T waves: normal               ED Course  ED Course as of Jun 22 1606   Tue Jun 22, 2021   1028 Pulse(!): 145   1028 EKG: afib with rvr, vent rate 154 bpm       1115 NT-proBNP(!): 11,876   1115 WBC(!): 14 38   1117 Creatinine(!): 1 80   1137 Cardiology recommends fluids      1211 TT sent to cardiology requesting further recommendations  BP improved, still tachycardic       1320 Cardiology recommended Cardizem drip  Recommended admission for correction of TSH to further assist with heart rate control      1330 Tiger text was sent to Dr Menchaca who accepted the patient to the medicine service                    MDM  Number of Diagnoses or Management Options  Atrial fibrillation with rapid ventricular response ([address] ): new and requires workup  Elevated brain natriuretic peptide (BNP) level: new and requires workup  Leukocytosis: new and requires workup  Low TSH level: new and requires workup  Diagnosis management comments: 77year old female presents emergency department for evaluation of onset AFib  Patient was seen by PCP yesterday diagnosed new onset AFib  She was seen due to feeling nervous  Vitals and medical record reviewed  Patient is tachycardic heart irregular regular rhythm  Lungs are decreased  Patient tachypneic in no acute respiratory distress  No significant peripheral edema  Patient had outpatient labs yesterday noted for low TSH  Patient found have leukocytosis, elevated BNP, elevated creatinine  Attempted to improved heart rate with Lopressor and patient became hypotensive  Per cardiology recommendation gave fluids followed by 2 additional doses of Lopressor  Without improvement of symptoms    Tried dose of digoxin no improvement of symptoms  Per cardiology recommendations started on Cardizem drip  Discussed with hospitalist who accepts patient for admission  Patient remained well emergency department  Amount and/or Complexity of Data Reviewed  Clinical lab tests: ordered and reviewed  Tests in the radiology section of CPT®: ordered and reviewed  Review and summarize past medical records: yes  Discuss the patient with other providers: yes  Independent visualization of images, tracings, or specimens: yes        Disposition  Final diagnoses:   Atrial fibrillation with rapid ventricular response (HCC)   Low TSH level   Elevated brain natriuretic peptide (BNP) level   Leukocytosis     Time reflects when diagnosis was documented in both MDM as applicable and the Disposition within this note     Time User Action Codes Description Comment    6/22/2021  1:36 PM Simuel Linker [[id number]] Atrial fibrillation with rapid ventricular response ([address] )     6/22/2021  1:37 PM Shelbi Budd Add [R79 [id number]] Low TSH level     6/22/2021  1:37 PM Shelbi Budd Add [R79 [id number]] Elevated brain natriuretic peptide (BNP) level     6/22/2021  1:37 PM Shelbi Budd Add [[id number]] Leukocytosis       ED Disposition     ED Disposition Condition Date/Time Comment    Admit Stable Tue Jun 22, 2021  1:36 PM Case was discussed with Dr Roberta Patton and the patient's admission status was agreed to be Admission Status: inpatient status to the service of Dr Roberta Patton          Follow-up Information    None         Current Discharge Medication List      CONTINUE these medications which have NOT CHANGED    Details   albuterol (PROVENTIL HFA,VENTOLIN HFA) 90 mcg/act inhaler Inhale 2 puffs    Comments: Substitution to a formulary equivalent within the same pharmaceutical class is authorized        aspirin 325 mg tablet Take 325 mg by mouth      atenolol (TENORMIN) 25 mg tablet Take 25 mg by mouth daily      fluticasone (FLOVENT HFA) 110 MCG/ACT inhaler Inhale 2 puffs      lisinopril (ZESTRIL) 2 5 mg tablet Take 2 5 mg by mouth daily      metoprolol succinate (TOPROL-XL) 50 mg 24 hr tablet Take 50 mg by mouth      rivaroxaban (XARELTO) 15 mg tablet Take 15 mg by mouth           No discharge procedures on file      PDMP Review     None          ED Provider  Electronically Signed by           Giulia Granados PA-C  06/22/21 3856

## 2021-06-22 NOTE — ASSESSMENT & PLAN NOTE
Based on clinical symptoms (inside, tremor, weight loss, hot intolerance, palpitation) with elevated free T4, low TSH level  Discussed the case via tiger text with on-call endocrinologist-recommends to start 5 mg methimazole daily and repeat TSH and free T4 in 4-6 weeks  Continue beta-blocker to control heart rate  Follow ultrasound of thyroid  Lipid panel normal

## 2021-06-22 NOTE — H&P
Nan Mcdaniel [id number] 1955, 77 y o  female MRN: [id number]  Unit/Bed#: -01 Encounter: [id number]  Primary Care Provider: Dequan Calderón DO   Date and time admitted to hospital: 6/22/2021 10:15 AM    KAELYN (acute kidney injury) [facility])  Assessment & Plan  Baseline creatinine is 1 2  Today creatinine is 1 80  Unknown etiology  Status post hydration  Monitor renal function    Hyperthyroidism  Assessment & Plan  Based on clinical symptoms (inside, tremor, weight loss, hot intolerance, palpitation) with elevated free T4, low TSH level  Discussed the case via tiger text with on-call endocrinologist-recommends to start 5 mg methimazole daily and repeat TSH and free T4 in 4-6 weeks  Continue beta-blocker to control heart rate  Follow ultrasound of thyroid  Lipid panel normal    * New onset a-fib [facility])  Assessment & Plan  Present on admission  [id number]=5  Patient also has been suffering with hyperthyroidism based on clinical symptoms and lab findings-most likely the cause of Afib  As per EKG patient is having AFib with RVR  Status post digoxin 250 mcg, Cardizem drip, patient also received metoprolol 5 mg x 3  Continue Cardizem drip and and titrate based on patient's heart rate, also continue beta-blocker since patient has hyperthyroidism  No source of infection noted  Continue monitor telemetry  Continue Cardizem drip  Since patient has hyperthyroidism, will start beta-blocker as per Cardiology recommendation  Magnesium level is 1 9-will start p o   Magnesium and try to keep magnesium more than 2  Keep potassium more than 4  Follow echocardiogram  Patient already started on Xarelto    Tobacco abuse  Assessment & Plan  Patient is cutting it down  Continue to encourage    Increased anion gap metabolic acidosis  Assessment & Plan  Anion gap 16, bicarb is 19  Could be secondary to KALEYN  Patient does have AFib, tachypneic elevated WBC-but no known source of infection, most likely secondary to hypothyroidism  Status post IV hydration  Recheck lab  Lactic acid normal    VTE Prophylaxis: Rivaroxaban (Xarelto)  / sequential compression device   Code Status:  Full code    Anticipated Length of Stay:  Patient will be admitted on an Inpatient basis with an anticipated length of stay of  > 2 midnights  Justification for Hospital Stay:  To monitor above condition    Total Time for Visit, including Counseling / Coordination of Care: 45 minutes  Greater than 50% of this total time spent on direct patient counseling and coordination of care  Chief Complaint:   Fatigue, tired and palpitation    History of Present Illness: Kvng Whitehead is a 77 y o  female who presents with fatigue, tired and palpitation  As per patient, she has been suffering fatigue and tired and losing weight since May 2021  Patient reports she also feels anxious, notice some tremor, have palpitation, feeling of hot intolerance, and weight loss  Denies any frequent bowel movement  Patient also reports she sweats a lot  Also reports feels short of breath, denies any chest pain, nausea, vomiting  Denies any recent URI  Still smokes, but cutting it down  Review of Systems:    Review of Systems   Constitutional: Positive for activity change, diaphoresis, fatigue and unexpected weight change  Negative for appetite change, chills and fever  HENT: Negative for congestion, dental problem, mouth sores, nosebleeds and postnasal drip  Respiratory: Positive for shortness of breath  Negative for apnea and stridor  Cardiovascular: Positive for palpitations  Negative for chest pain and leg swelling  Gastrointestinal: Negative for abdominal distention, abdominal pain, constipation, diarrhea and nausea  Genitourinary: Negative for difficulty urinating and menstrual problem  Musculoskeletal: Negative for arthralgias, back pain and neck stiffness  Skin: Negative for color change, pallor and wound  Neurological: Negative for dizziness, light-headedness and numbness  Hematological: Negative for adenopathy  Psychiatric/Behavioral: Negative for agitation, behavioral problems and confusion  All other systems reviewed and are negative  Past Medical and Surgical History:     Past Medical History:   Diagnosis Date    COPD [facility]chronic obstructive pulmonary disease) ([facility] [address] )     High cholesterol        Past Surgical History:   Procedure Laterality Date    APPENDECTOMY         Meds/Allergies:    Prior to Admission medications    Medication Sig Start Date End Date Taking? Authorizing Provider   albuterol (PROVENTIL HFA,VENTOLIN HFA) 90 mcg/act inhaler Inhale 2 puffs 3/16/21  Yes Historical Provider, MD   aspirin 325 mg tablet Take 325 mg by mouth   Yes Historical Provider, MD   atenolol (TENORMIN) 25 mg tablet Take 25 mg by mouth daily   Yes Historical Provider, MD   fluticasone (FLOVENT HFA) 110 MCG/ACT inhaler Inhale 2 puffs 6/21/21  Yes Historical Provider, MD   lisinopril (ZESTRIL) 2 5 mg tablet Take 2 5 mg by mouth daily   Yes Historical Provider, MD   metoprolol succinate (TOPROL-XL) 50 mg 24 hr tablet Take 50 mg by mouth 6/21/21  Yes Historical Provider, MD   rivaroxaban (XARELTO) 15 mg tablet Take 15 mg by mouth 6/21/21 7/12/21 Yes Historical Provider, MD   naproxen (NAPROSYN) 500 mg tablet Take 1 tablet (500 mg total) by mouth 2 (two) times a day with meals 4/14/21 6/22/21  Penelope Sands I have reviewed home medications with patient personally      Allergies: No Known Allergies    Social History:     Marital Status:    Occupation:  Unknown  Patient Pre-hospital Living Situation:  At home  Patient Pre-hospital Level of Mobility:  Independent  Patient Pre-hospital Diet Restrictions:  No restriction  Substance Use History:   Social History     Substance and Sexual Activity   Alcohol Use Yes    Comment: occasionally     Social History     Tobacco Use   Smoking Status Current Every Day Smoker  Packs/day: 0 50    Types: Cigarettes   Smokeless Tobacco Never Used   Tobacco Comment    30 pk yr hx     Social History     Substance and Sexual Activity   Drug Use Never       Family History:    non-contributory    Physical Exam:     Vitals:   Blood Pressure: 159/68 (06/22/21 1700)  Pulse: (!) 152 (06/22/21 1700)  Temperature: 97 8 °F (36 6 °C) (06/22/21 1427)  Temp Source: Oral (06/22/21 1427)  Respirations: (!) 33 (06/22/21 1700)  Height: 5' 8" (172 7 cm) (06/22/21 1427)  Weight - Scale: 111 kg (244 lb 7 8 oz) (06/22/21 1427)  SpO2: 96 % (06/22/21 1700)    Physical Exam  Vitals and nursing note reviewed  Exam conducted with a chaperone present  Constitutional:       Appearance: She is not diaphoretic  HENT:      Right Ear: There is no impacted cerumen  Mouth/Throat:      Mouth: Mucous membranes are moist       Pharynx: No oropharyngeal exudate  Eyes:      General: No scleral icterus  Conjunctiva/sclera: Conjunctivae normal       Pupils: Pupils are equal, round, and reactive to light  Cardiovascular:      Rate and Rhythm: Tachycardia present  Rhythm irregular  Heart sounds: No friction rub  No gallop  Pulmonary:      Effort: Pulmonary effort is normal  No respiratory distress  Breath sounds: No stridor  No wheezing or rhonchi  Abdominal:      General: Abdomen is flat  Bowel sounds are normal  There is no distension  Palpations: There is no mass  Tenderness: There is no abdominal tenderness  Hernia: No hernia is present  Musculoskeletal:         General: Normal range of motion  Cervical back: Normal range of motion  Right lower leg: No edema  Left lower leg: No edema  Skin:     General: Skin is warm  Neurological:      General: No focal deficit present  Mental Status: She is alert and oriented to person, place, and time     Psychiatric:         Mood and Affect: Mood normal            Additional Data:     Lab Results: I have personally reviewed pertinent reports  Results from last 7 days   Lab Units 06/22/21  1043   WBC Thousand/uL 14 38*   HEMOGLOBIN g/dL 13 2   HEMATOCRIT % 40 2   PLATELETS Thousands/uL 194   NEUTROS PCT % 80*   LYMPHS PCT % 10*   MONOS PCT % 9   EOS PCT % 0     Results from last 7 days   Lab Units 06/22/21  1043   SODIUM mmol/L 142   POTASSIUM mmol/L 4 3   CHLORIDE mmol/L 107   CO2 mmol/L 19*   BUN mg/dL 54*   CREATININE mg/dL 1 80*   ANION GAP mmol/L 16*   CALCIUM mg/dL 9 5   ALBUMIN g/dL 3 1*   TOTAL BILIRUBIN mg/dL 0 85   ALK PHOS U/L 72   ALT U/L 15   AST U/L 11   GLUCOSE RANDOM mg/dL 124     Results from last 7 days   Lab Units 06/22/21  1043   INR  2 38*             Results from last 7 days   Lab Units 06/22/21  1128   LACTIC ACID mmol/L 1 8       Imaging: I have personally reviewed pertinent reports  XR chest 1 view portable   Final Result by Faith Linares MD (06/22 1623)      No acute cardiopulmonary disease  Workstation performed: [id number]         US thyroid    (Results Pending)       EKG, Pathology, and Other Studies Reviewed on Admission:   · EKG:  AFib with RVR    Allscripts / Epic Records Reviewed: Yes     ** Please Note: This note has been constructed using a voice recognition system   **

## 2021-06-22 NOTE — ASSESSMENT & PLAN NOTE
Present on admission  [id number]=1  Patient also has been suffering with hyperthyroidism based on clinical symptoms and lab findings-most likely the cause of Afib  As per EKG patient is having AFib with RVR  Status post digoxin 250 mcg, Cardizem drip, patient also received metoprolol 5 mg x 3  Continue Cardizem drip and and titrate based on patient's heart rate, also continue beta-blocker since patient has hyperthyroidism  No source of infection noted  Continue monitor telemetry  Continue Cardizem drip  Since patient has hyperthyroidism, will start beta-blocker as per Cardiology recommendation  Magnesium level is 1 9-will start p o   Magnesium and try to keep magnesium more than 2  Keep potassium more than 4  Follow echocardiogram  Patient already started on Xarelto

## 2021-06-23 ENCOUNTER — APPOINTMENT (INPATIENT)
Dept: NON INVASIVE DIAGNOSTICS | Facility: HOSPITAL | Age: 66
DRG: 644 | End: 2021-06-23
Payer: COMMERCIAL

## 2021-06-23 LAB
ANION GAP SERPL CALCULATED.3IONS-SCNC: 10 MMOL/L (ref 4–13)
BASOPHILS # BLD AUTO: 0.04 THOUSANDS/ΜL (ref 0–0.1)
BASOPHILS NFR BLD AUTO: 0 % (ref 0–1)
BUN SERPL-MCNC: 43 MG/DL (ref 5–25)
CALCIUM SERPL-MCNC: 8.6 MG/DL (ref 8.3–10.1)
CHLORIDE SERPL-SCNC: 107 MMOL/L (ref 100–108)
CO2 SERPL-SCNC: 19 MMOL/L (ref 21–32)
CREAT SERPL-MCNC: 1.31 MG/DL (ref 0.6–1.3)
EOSINOPHIL # BLD AUTO: 0.02 THOUSAND/ΜL (ref 0–0.61)
EOSINOPHIL NFR BLD AUTO: 0 % (ref 0–6)
ERYTHROCYTE [DISTWIDTH] IN BLOOD BY AUTOMATED COUNT: 13.7 % (ref 11.6–15.1)
GFR SERPL CREATININE-BSD FRML MDRD: 42 ML/MIN/1.73SQ M
GLUCOSE SERPL-MCNC: 117 MG/DL (ref 65–140)
HCT VFR BLD AUTO: 36.6 % (ref 34.8–46.1)
HGB BLD-MCNC: 12.2 G/DL (ref 11.5–15.4)
IMM GRANULOCYTES # BLD AUTO: 0.06 THOUSAND/UL (ref 0–0.2)
IMM GRANULOCYTES NFR BLD AUTO: 0 % (ref 0–2)
LYMPHOCYTES # BLD AUTO: 1.51 THOUSANDS/ΜL (ref 0.6–4.47)
LYMPHOCYTES NFR BLD AUTO: 11 % (ref 14–44)
MAGNESIUM SERPL-MCNC: 1.7 MG/DL (ref 1.6–2.6)
MCH RBC QN AUTO: 30.5 PG (ref 26.8–34.3)
MCHC RBC AUTO-ENTMCNC: 33.3 G/DL (ref 31.4–37.4)
MCV RBC AUTO: 92 FL (ref 82–98)
MONOCYTES # BLD AUTO: 1.72 THOUSAND/ΜL (ref 0.17–1.22)
MONOCYTES NFR BLD AUTO: 12 % (ref 4–12)
NEUTROPHILS # BLD AUTO: 10.91 THOUSANDS/ΜL (ref 1.85–7.62)
NEUTS SEG NFR BLD AUTO: 77 % (ref 43–75)
NRBC BLD AUTO-RTO: 0 /100 WBCS
PLATELET # BLD AUTO: 163 THOUSANDS/UL (ref 149–390)
PMV BLD AUTO: 10.5 FL (ref 8.9–12.7)
POTASSIUM SERPL-SCNC: 3.9 MMOL/L (ref 3.5–5.3)
RBC # BLD AUTO: 4 MILLION/UL (ref 3.81–5.12)
SODIUM SERPL-SCNC: 136 MMOL/L (ref 136–145)
WBC # BLD AUTO: 14.26 THOUSAND/UL (ref 4.31–10.16)

## 2021-06-23 PROCEDURE — 99222 1ST HOSP IP/OBS MODERATE 55: CPT | Performed by: INTERNAL MEDICINE

## 2021-06-23 PROCEDURE — 86800 THYROGLOBULIN ANTIBODY: CPT | Performed by: FAMILY MEDICINE

## 2021-06-23 PROCEDURE — 99233 SBSQ HOSP IP/OBS HIGH 50: CPT | Performed by: FAMILY MEDICINE

## 2021-06-23 PROCEDURE — 85025 COMPLETE CBC W/AUTO DIFF WBC: CPT | Performed by: NURSE PRACTITIONER

## 2021-06-23 PROCEDURE — 86376 MICROSOMAL ANTIBODY EACH: CPT | Performed by: FAMILY MEDICINE

## 2021-06-23 PROCEDURE — 80048 BASIC METABOLIC PNL TOTAL CA: CPT | Performed by: NURSE PRACTITIONER

## 2021-06-23 PROCEDURE — 83735 ASSAY OF MAGNESIUM: CPT | Performed by: NURSE PRACTITIONER

## 2021-06-23 PROCEDURE — 84445 ASSAY OF TSI GLOBULIN: CPT | Performed by: FAMILY MEDICINE

## 2021-06-23 RX ORDER — METOPROLOL TARTRATE 5 MG/5ML
5 INJECTION INTRAVENOUS ONCE
Status: COMPLETED | OUTPATIENT
Start: 2021-06-23 | End: 2021-06-23

## 2021-06-23 RX ORDER — METOPROLOL TARTRATE 50 MG/1
50 TABLET, FILM COATED ORAL EVERY 6 HOURS
Status: DISCONTINUED | OUTPATIENT
Start: 2021-06-23 | End: 2021-06-23

## 2021-06-23 RX ORDER — MAGNESIUM SULFATE HEPTAHYDRATE 40 MG/ML
2 INJECTION, SOLUTION INTRAVENOUS ONCE
Status: COMPLETED | OUTPATIENT
Start: 2021-06-23 | End: 2021-06-23

## 2021-06-23 RX ADMIN — DOCUSATE SODIUM 100 MG: 100 CAPSULE, LIQUID FILLED ORAL at 17:34

## 2021-06-23 RX ADMIN — MAGNESIUM SULFATE HEPTAHYDRATE 2 G: 40 INJECTION, SOLUTION INTRAVENOUS at 08:42

## 2021-06-23 RX ADMIN — Medication 400 MG: at 17:34

## 2021-06-23 RX ADMIN — RIVAROXABAN 20 MG: 20 TABLET, FILM COATED ORAL at 08:40

## 2021-06-23 RX ADMIN — DILTIAZEM HYDROCHLORIDE 15 MG/HR: 5 INJECTION INTRAVENOUS at 07:07

## 2021-06-23 RX ADMIN — Medication 400 MG: at 08:41

## 2021-06-23 RX ADMIN — Medication 1 PATCH: at 08:41

## 2021-06-23 RX ADMIN — METOPROLOL TARTRATE 50 MG: 50 TABLET, FILM COATED ORAL at 11:40

## 2021-06-23 RX ADMIN — METOPROLOL TARTRATE 75 MG: 50 TABLET, FILM COATED ORAL at 22:43

## 2021-06-23 RX ADMIN — METOPROLOL TARTRATE 25 MG: 25 TABLET, FILM COATED ORAL at 06:02

## 2021-06-23 RX ADMIN — FLUTICASONE PROPIONATE 2 PUFF: 110 AEROSOL, METERED RESPIRATORY (INHALATION) at 08:42

## 2021-06-23 RX ADMIN — METHIMAZOLE 5 MG: 5 TABLET ORAL at 08:42

## 2021-06-23 RX ADMIN — DOCUSATE SODIUM 100 MG: 100 CAPSULE, LIQUID FILLED ORAL at 08:41

## 2021-06-23 RX ADMIN — METOPROLOL TARTRATE 50 MG: 50 TABLET, FILM COATED ORAL at 17:34

## 2021-06-23 RX ADMIN — METOPROLOL TARTRATE 25 MG: 25 TABLET, FILM COATED ORAL at 09:40

## 2021-06-23 RX ADMIN — METOROPROLOL TARTRATE 5 MG: 5 INJECTION, SOLUTION INTRAVENOUS at 03:59

## 2021-06-23 RX ADMIN — METOROPROLOL TARTRATE 5 MG: 5 INJECTION, SOLUTION INTRAVENOUS at 20:32

## 2021-06-23 RX ADMIN — FLUTICASONE PROPIONATE 2 PUFF: 110 AEROSOL, METERED RESPIRATORY (INHALATION) at 20:32

## 2021-06-23 RX ADMIN — METOROPROLOL TARTRATE 5 MG: 5 INJECTION, SOLUTION INTRAVENOUS at 19:55

## 2021-06-23 RX ADMIN — METOROPROLOL TARTRATE 5 MG: 5 INJECTION, SOLUTION INTRAVENOUS at 13:35

## 2021-06-23 NOTE — PLAN OF CARE
Problem: Potential for Falls  Goal: Patient will remain free of falls  Description: INTERVENTIONS:  - Educate patient/family on patient safety including physical limitations  - Instruct patient to call for assistance with activity   - Consult OT/PT to assist with strengthening/mobility   - Keep Call bell within reach  - Keep bed low and locked with side rails adjusted as appropriate  - Keep care items and personal belongings within reach  - Initiate and maintain comfort rounds  - Make Fall Risk Sign visible to staff  - Offer Toileting in advance of need  - Initiate/Maintain alarm  - Obtain necessary fall risk management equipment:   - Apply yellow socks and bracelet for high fall risk patients  - Consider moving patient to room near nurses station  Outcome: Progressing     Problem: MOBILITY - ADULT  Goal: Maintain or return to baseline ADL function  Description: INTERVENTIONS:  -  Assess patient's ability to carry out ADLs; assess patient's baseline for ADL function and identify physical deficits which impact ability to perform ADLs (bathing, care of mouth/teeth, toileting, grooming, dressing, etc )  - Assess/evaluate cause of self-care deficits   - Assess range of motion  - Assess patient's mobility; develop plan if impaired  - Assess patient's need for assistive devices and provide as appropriate  - Encourage maximum independence but intervene and supervise when necessary  - Involve family in performance of ADLs  - Assess for home care needs following discharge   - Consider OT consult to assist with ADL evaluation and planning for discharge  - Provide patient education as appropriate  Outcome: Progressing  Goal: Maintains/Returns to pre admission functional level  Description: INTERVENTIONS:  - Perform BMAT or MOVE assessment daily    - Set and communicate daily mobility goal to care team and patient/family/caregiver     - Collaborate with rehabilitation services on mobility goals if consulted  - Perform Range of Motion   - Reposition patient   - Dangle patient  - Stand patient   - Ambulate patient  - Out of bed to chair   - Out of bed for meals  - Out of bed for toileting  - Record patient progress and toleration of activity level   Outcome: Progressing     Problem: CARDIOVASCULAR - ADULT  Goal: Maintains optimal cardiac output and hemodynamic stability  Description: INTERVENTIONS:  - Monitor I/O, vital signs and rhythm  - Monitor for S/S and trends of decreased cardiac output  - Administer and titrate ordered vasoactive medications to optimize hemodynamic stability  - Assess quality of pulses, skin color and temperature  - Assess for signs of decreased coronary artery perfusion  - Instruct patient to report change in severity of symptoms  Outcome: Progressing  Goal: Absence of cardiac dysrhythmias or at baseline rhythm  Description: INTERVENTIONS:  - Continuous cardiac monitoring, vital signs, obtain 12 lead EKG if ordered  - Administer antiarrhythmic and heart rate control medications as ordered  - Monitor electrolytes and administer replacement therapy as ordered  Outcome: Progressing     Problem: INFECTION - ADULT  Goal: Absence or prevention of progression during hospitalization  Description: INTERVENTIONS:  - Assess and monitor for signs and symptoms of infection  - Monitor lab/diagnostic results  - Monitor all insertion sites, i e  indwelling lines, tubes, and drains  - Monitor endotracheal if appropriate and nasal secretions for changes in amount and color  - [place] appropriate cooling/warming therapies per order  - Administer medications as ordered  - Instruct and encourage patient and family to use good hand hygiene technique  - Identify and instruct in appropriate isolation precautions for identified infection/condition  Outcome: Progressing     Problem: DISCHARGE PLANNING  Goal: Discharge to home or other facility with appropriate resources  Description: INTERVENTIONS:  - Identify barriers to discharge w/patient and caregiver  - Arrange for needed discharge resources and transportation as appropriate  - Identify discharge learning needs (meds, wound care, etc )  - Arrange for interpretive services to assist at discharge as needed  - Refer to Case Management Department for coordinating discharge planning if the patient needs post-hospital services based on physician/advanced practitioner order or complex needs related to functional status, cognitive ability, or social support system  Outcome: Progressing     Problem: Knowledge Deficit  Goal: Patient/family/caregiver demonstrates understanding of disease process, treatment plan, medications, and discharge instructions  Description: Complete learning assessment and assess knowledge base    Interventions:  - Provide teaching at level of understanding  - Provide teaching via preferred learning methods  Outcome: Progressing

## 2021-06-23 NOTE — ASSESSMENT & PLAN NOTE
Based on clinical symptoms (inside, tremor, weight loss, hot intolerance, palpitation) with elevated free T4, low TSH level  Discussed the case via tiger text with on-call endocrinologist-recommends to start 5 mg methimazole daily and repeat TSH and free T4 in 4-6 weeks  Continue beta-blocker to control heart rate  Follow ultrasound of thyroid  Lipid panel normal  Check thyroid antibodies panel

## 2021-06-23 NOTE — CONSULTS
Consult Cardiology    Gonzalo Winters 1955, 77 y o  female MRN: [id number]    Unit/Bed#: -01 Encounter: [id number]    Attending Provider: Lamin Marques MD   Primary Care Provider: George Marie DO   Date admitted to hospital: 6/22/2021       Consults    * New onset a-fib [facility])  Assessment & Plan  Patient presented to her PCP office on 6/21/21 with c/o shortness of breath on exertion since May and found to be in new onset A fib with RVR  PCP ordered labs, started Xarelto, and switched her Atenolol to Metoprolol  She was referred to [facility]iology - pt seen outpatient by [facility] Cardiology on 6/22/21 and again found to be in A Fib with RVR and shortness of breath  Outpatient labs - TSH <0 01, BNP 9997, Creatinine 2 1, K+ 5 2  She was taken via ambulance to [address] [place] [address]  In ER she received IV Lopressor 5mg x3 doses, Digoxin 250mg IV x 1 with no HR response  She was started on Diltiazem gtt and endocrinology consulted for the suppressed TSH  She was started on Methimazole 5mg and Metoprolol 25mg PO QID  Heart rate remains elevated in 140's despite extra dose of Lopressor 5mg and Dig 250mg IV  Heart rate control complicated by suppressed TSH  Will continue to titrate Metoprolol as needed  Goal to wean off Diltiazem gtt as heart rates allow  Increase Metoprolol to 50mg every 6 hrs and will continue to up-titrate dose as needed   Continue Xarelto  Optimize electrolytes with K+ > 4, Mag > 2        Hyperthyroidism  Assessment & Plan  TSH noted to be suppressed at < 0 01 with free T4 2 2  Endocrinology consulted and pt started on Methimazole 5mg  Heart rate control will be difficult to achieve until thyroid is corrected    Will continue to up-titrate beta blocker therapy as needed            Physician Requesting Consult: Lamin Marques MD    Reason for Consult / Principal Problem: Atrial fibrillation with rapid ventricular response      HPI: Gonzalo Winters is a 77y o  year old female who has a history of COPD, HTN  Patient presents with new onset atrial fibrillation with RVR  Corinne Stade had presented to her PCP office on 06/21/2021 with c/o shortness of breath on exertion since May  She was found to be in a fib with RVR  Her PCP switched her Atenolol to Metoprolol, added Xarelto, and ordered blood work  Labs revealed TSH < 0 01, BNP 9997, Creatinine 2 1, Potassium 5 2  She was seen by [facility] Cardiology outpatient 06/22/2021 and an ambulance was called to take her to the ER  She tells me that she had been feeling short of breath with activity, such as vacuuming, since May  She would occasionally feel fluttering in her chest that was short-lived  She denies experiencing chest pain/pressure, lightheadedness/dizziness, or leg swelling  She smokes 1/2 PPD  Denies sleep apnea history  Upon presentation to [address] [place] [address] ER on 6/22/21 she remained in A Fib with RVR, rates 150's  She was given IV bolus of Digoxen, Lopressor, and started on Diltiazem gtt in the ER  Heart rates poor to respond, secondary to suppressed TSH  Endocrinology was consulted and Methimazole added  Today she continues to feel short of breath  She denies any palpitations/heart racing, dizziness/lightheadedness  She denies chest pain/pressure/heaviness  She does have a cough  Review of Systems   Constitutional: Negative for chills and fever  HENT: Negative for ear pain and sore throat  Eyes: Negative for pain and visual disturbance  Respiratory: Positive for shortness of breath  Negative for cough  Cardiovascular: Negative for chest pain, palpitations and leg swelling  Gastrointestinal: Negative for abdominal pain and vomiting  Genitourinary: Negative for dysuria and hematuria  Musculoskeletal: Negative for arthralgias and back pain  Skin: Negative for color change and rash  Neurological: Negative for seizures and syncope  All other systems reviewed and are negative         Historical Information     Past Medical History:   Diagnosis Date    COPD (chronic obstructive pulmonary disease) ([address] )     High cholesterol     Hypertension      Past Surgical History:   Procedure Laterality Date    APPENDECTOMY      CATARACT EXTRACTION Bilateral      Social History     Substance and Sexual Activity   Alcohol Use Yes    Comment: occasionally     Social History     Substance and Sexual Activity   Drug Use Never     Social History     Tobacco Use   Smoking Status Current Every Day Smoker    Packs/day: 0 50    Types: Cigarettes   Smokeless Tobacco Never Used   Tobacco Comment    30 pk yr hx       Family History: non-contributory    Meds/Allergies     current meds:   Current Facility-Administered Medications   Medication Dose Route Frequency    albuterol (PROVENTIL HFA,VENTOLIN HFA) inhaler 2 puff  2 puff Inhalation Q4H PRN    ALPRAZolam (XANAX) tablet 0 5 mg  0 5 mg Oral TID PRN    diltiazem (CARDIZEM) 125 mg in sodium chloride 0 9 % 125 mL infusion  1-15 mg/hr Intravenous Titrated    docusate sodium (COLACE) capsule 100 mg  100 mg Oral BID    fluticasone (FLOVENT HFA) 110 MCG/ACT inhaler 2 puff  2 puff Inhalation Q12H [address]    magnesium oxide (MAG-OX) tablet 400 mg  400 mg Oral BID    methimazole (TAPAZOLE) tablet 5 mg  5 mg Oral Daily    metoprolol (LOPRESSOR) injection 5 mg  5 mg Intravenous Q6H PRN    metoprolol tartrate (LOPRESSOR) tablet 50 mg  50 mg Oral Q6H    nicotine (NICODERM CQ) 14 mg/24hr TD 24 hr patch 1 patch  1 patch Transdermal Daily    ondansetron (ZOFRAN) injection 4 mg  4 mg Intravenous Q6H PRN    rivaroxaban (XARELTO) tablet 20 mg  20 mg Oral Daily With Breakfast     diltiazem, 1-15 mg/hr, Last Rate: 15 mg/hr (06/23/21 0707)        No Known Allergies    Objective     Vitals: Blood pressure 107/71, pulse (!) 130, temperature (!) 97 4 °F (36 3 °C), temperature source Temporal, resp  rate (!) 27, height 5' 8" (1 727 m), weight 110 kg (243 lb 6 2 oz), SpO2 96 %  , Body mass index is 37 01 kg/m²  Orthostatic Blood Pressures      Most Recent Value   Blood Pressure  107/71 filed at 06/23/2021 1116   Patient Position - Orthostatic VS  Lying filed at 06/23/2021 9675          Systolic (81XWN), XWD:354 , Min:96 , ZOV:571     Diastolic (19QYY), NYV:24, Min:56, Max:108        Intake/Output Summary (Last 24 hours) at 6/23/2021 1211  Last data filed at 6/23/2021 1135  Gross per 24 hour   Intake 1845 63 ml   Output 1525 ml   Net 320 63 ml       Weight (last 2 days)     Date/Time   Weight    06/23/21 0330   110 (243 39)    06/22/21 1427   111 (244 49)    06/22/21 1025   118 (259 92)              Invasive Devices     None                 Physical Exam  Vitals and nursing note reviewed  Constitutional:       General: She is not in acute distress  Appearance: She is well-developed  HENT:      Head: Normocephalic and atraumatic  Eyes:      Conjunctiva/sclera: Conjunctivae normal    Neck:      Vascular: No carotid bruit or JVD  Cardiovascular:      Rate and Rhythm: Tachycardia present  Rhythm irregular  Heart sounds: No murmur heard  Pulmonary:      Effort: Pulmonary effort is normal  No respiratory distress  Breath sounds: Examination of the right-lower field reveals decreased breath sounds  Examination of the left-lower field reveals decreased breath sounds  Decreased breath sounds present  Abdominal:      Palpations: Abdomen is soft  Tenderness: There is no abdominal tenderness  Musculoskeletal:      Cervical back: Neck supple  Right lower leg: No edema  Left lower leg: No edema  Skin:     General: Skin is warm and dry  Neurological:      Mental Status: She is alert and oriented to person, place, and time  Psychiatric:         Mood and Affect: Mood normal          Speech: Speech normal          Behavior: Behavior is cooperative           Cognition and Memory: Cognition normal               Laboratory Results:    Results from last 7 days   Lab Units 06/22/21  1043   TROPONIN I ng/mL <0 02       CBC with diff:   Results from last 7 days   Lab Units 06/23/21  0559 06/22/21  1043   WBC Thousand/uL 14 26* 14 38*   HEMOGLOBIN g/dL 12 2 13 2   HEMATOCRIT % 36 6 40 2   MCV fL 92 93   PLATELETS Thousands/uL 163 194   MCH pg 30 5 30 6   MCHC g/dL 33 3 32 8   RDW % 13 7 13 8   MPV fL 10 5 10 7   NRBC AUTO /100 WBCs 0 0         CMP:  Results from last 7 days   Lab Units 06/23/21  0610 06/22/21  1722 06/22/21  1043   POTASSIUM mmol/L 3 9 4 0 4 3   CHLORIDE mmol/L 107 107 107   CO2 mmol/L 19* 17* 19*   BUN mg/dL 43* 48* 54*   CREATININE mg/dL 1 31* 1 52* 1 80*   CALCIUM mg/dL 8 6 9 0 9 5   AST U/L  --  9 11   ALT U/L  --  13 15   ALK PHOS U/L  --  70 72   EGFR ml/min/1 73sq m 42 35 29       BMP:  Results from last 7 days   Lab Units 06/23/21  0610 06/22/21  1722 06/22/21  1043   POTASSIUM mmol/L 3 9 4 0 4 3   CHLORIDE mmol/L 107 107 107   CO2 mmol/L 19* 17* 19*   BUN mg/dL 43* 48* 54*   CREATININE mg/dL 1 31* 1 52* 1 80*   CALCIUM mg/dL 8 6 9 0 9 5       BNP 06/22/21: 11,876    Magnesium:   Results from last 7 days   Lab Units 06/23/21  0610 06/22/21  1043   MAGNESIUM mg/dL 1 7 1 9       Coags:   Results from last 7 days   Lab Units 06/22/21  1043   PTT seconds 34   INR  2 38*       TSH:   < 0 01 Free T4: 2 2    Cardiac testing:     Echocardiogram pending    Imaging: I have personally reviewed pertinent reports  XR chest 1 view portable    Result Date: 6/22/2021  Narrative: CHEST INDICATION:   tachycardia  COMPARISON:  None  EXAM PERFORMED/VIEWS:  XR CHEST PORTABLE  FINDINGS: Cardiomediastinal silhouette normal  Lungs clear  No effusion or pneumothorax  Osseous structures normal for age  Impression: No acute cardiopulmonary disease  Workstation performed: [id number]       EKG reviewed personally: EKG 06/22/2021: atrial fibrillation with RVR, ventricular rate 154, left axis deviation with nonspecific ST and T wave changes       Telemetry: atrial fibrillation, rates 140's-150's      Counseling / Coordination of Care  Total floor / unit time spent today 45 minutes  Greater than 50% of total time was spent with the patient and / or family counseling and / or coordination of care    A description of the counseling / coordination of care: case discussed with Dr Constantine Fabian         Code Status: Level 1 - Full Code

## 2021-06-23 NOTE — PROGRESS NOTES
Pt is an admission  Admit with hyperthyroid induced afib  Not a readmission  Patients risk for unplanned readmission score is 13  Cardiology is consulted  Cardizem infusion, IV lopressor prn    CM met with patient at the bedside,baseline information  was obtained  CM discussed the role of CM in helping the patient develop a discharge plan and assist the patient in carry out their plan       06/23/21 2244   Patient Information   Mental Status Alert   Primary Caregiver Self   Support System Immediate family   Activities of Daily Living Prior to Admission   Functional Status Independent   Assistive Device No device needed   [address] [place] [address] Pension/skilled nursing   Means of Transportation   Means of Transport to Appts: Drive Self     Patient has identified: Mikayla Allen her granddaughter as her primary   Viki is able to assist upon discharge  Viki's phone number is [phone] is the POA and patient does have an advance directive:  Requested patient to bring in a copy of their AD/POA to be scanned into the chart  PCP:  Leslye Carter           Pt has a prescription plan and uses [facility]      Patient is Not a [place]:   Pt denies SA/MH history  Pt has 1 son who lives across the street from her  House set up: [address] the 2 story home with 12 steps to go up to the bedroom and bathroom  Functional level PTA: I/tpa,   DME use: none  Prior use of Home Care or STR: none  Transportation:pt drives and car is in the parking lot  [employer]: None    CM reviewed d/c planning process including the following: identifying help at home, patient preference for d/c planning needs, availability of treatment team to discuss questions or concerns patient and/or family may have regarding understanding medications and recognizing signs and symptoms once discharged  CM also encouraged patient to follow up with all recommended appointments after discharge  Patient advised of importance for patient and family to participate in managing patients medical well being  DC plan at this time is home, no needs identified  CM will continue to follow while hospitalized

## 2021-06-23 NOTE — UTILIZATION REVIEW
Initial Clinical Review    Admission: Date/Time/Statement:   Admission Orders (From admission, onward)     Ordered        06/22/21 1338  Inpatient Admission  Once                   Orders Placed This Encounter   Procedures    Inpatient Admission     Standing Status:   Standing     Number of Occurrences:   1     Order Specific Question:   Level of Care     Answer:   Level 2 Stepdown / HOT [14]     Order Specific Question:   Estimated length of stay     Answer:   More than 2 Midnights     Order Specific Question:   Certification     Answer:   I certify that inpatient services are medically necessary for this patient for a duration of greater than two midnights  See H&P and MD Progress Notes for additional information about the patient's course of treatment  ED Arrival Information     Expected Arrival Acuity    - 6/22/2021 10:15 Emergent         Means of arrival Escorted by Service Admission type    Ambulance [facility] Emergency         Arrival complaint    abnormal ECG, shortness of breath        Chief Complaint   Patient presents with    Atrial Fibrillation     pt c/o new onset afib w/sob at pcp office visit today  Pt arrived via EMS  Initial Presentation:  78 yo f   With a pmh of COPD, high cholesterol  She presented to the ED after being seen by her PCP a nd was found to be in new onset afib  She had complained of feeling "off"  Since May, with fatigue, tired , palpitation  And losing weight  She was hypotensive 70/50  She is admitted inpatient to critical care for new onset afib and based on symptoms  ElevatedT4 low TSH  Dx with hyperthyroidism, and KAELYN  ( baseline Cr 1 2 was 1 8 )   Date: 6/23/21   Day 2:   Pt reports she is still feeling tired and fatigued  Continues Cardizem Gtt, up titrate beta - blocker as tolerated as per cardiology  Heart rate control will be difficult until thyroid functions suppressed   Cr improved to 1 31 today               Cardiology  -consult - new onset Afib  - 6/23 - She was seen byPCP on 6/21 new Afibm labs ordered, Started on Metoprolol  -Atenolol d /cd- seen by Cardiology 6/22 - again in afib with RVR & SOB  Sent to the ED  Started on cardizem gtt after failed iv meds  Heart rate contyrol complicated by supressed  TSH, titrate metoprolol and wean cardizen as heart rate allows  Continue xarelto/  Monitoroptimize electrolytes  Endocrine  Consult - hypothyroidism       ED Triage Vitals   Temperature Pulse Respirations Blood Pressure SpO2   06/22/21 1025 06/22/21 1025 06/22/21 1025 06/22/21 1025 06/22/21 1024   (!) 97 2 °F (36 2 °C) (!) 145 (!) 25 127/93 98 %      Temp Source Heart Rate Source Patient Position - Orthostatic VS BP Location FiO2 (%)   06/22/21 1025 06/22/21 1025 06/22/21 1025 06/22/21 1025 --   Temporal Monitor Lying Left arm       Pain Score       06/22/21 1440       No Pain          Wt Readings from Last 1 Encounters:   06/23/21 110 kg (243 lb 6 2 oz)     Additional Vital Signs:   Date/Time  Temp  Pulse  Resp  BP  MAP (mmHg)  SpO2  Calculated FIO2 (%) - Nasal Cannula  Nasal Cannula O2 Flow Rate (L/min)  O2 Device  Patient Position - Orthostatic VS   06/23/21 0700  98 3 °F (36 8 °C)  144Abnormal   21  100/72  81  95 %  --  --  None (Room air)  Sitting   06/23/21 0330  98 2 °F (36 8 °C)  150Abnormal   22  126/90  103  93 %  --  --  None (Room air)  Sitting   06/22/21 2343  99 4 °F (37 4 °C)  152Abnormal   22  119/56  80  95 %  --  --  None (Room air)  Lying   06/22/21 2200  --  140Abnormal   --  --  --  --  --  --  --  --   06/22/21 1900  97 7 °F (36 5 °C)  140Abnormal   30Abnormal   153/108Abnormal   125  96 %  --  --  None (Room air)  Lying   06/22/21 1700  --  152Abnormal   33Abnormal   159/68  84  96 %  --  --  --  --   06/22/21 1655  --  156Abnormal   28Abnormal   159/68  84  95 %  --  --  None (Room air)  Sitting   06/22/21 1503  --  159Abnormal   76Abnormal   --  --  --  --  --  --  --   06/22/21 1443  --  151Abnormal 32Abnormal   132/77  98  95 %  --  --  --  --   06/22/21 1438  --  160Abnormal   27Abnormal   115/79  93  97 %  --  --  --  --   06/22/21 1427  97 8 °F (36 6 °C)  162Abnormal   30Abnormal   115/79  93  98 %  --  --  None (Room air)  Lying   06/22/21 1400  --  165Abnormal   25Abnormal   115/66  84  98 %  --  --  None (Room air)  Sitting   06/22/21 1245  --  165Abnormal   23Abnormal   116/87  98  96 %  --  --  --  --   06/22/21 1230  --  164Abnormal   24Abnormal   110/70  83  96 %  --  --  --  --   06/22/21 1215  --  168Abnormal   22  --  --  92 %  --  --  --  --   06/22/21 1200  --  166Abnormal   22  103/69  80  93 %  --  --  --  --   06/22/21 1130  --  163Abnormal   22  89/53Abnormal   66  99 %  --  --  --  --   06/22/21 1115  --  163Abnormal   22  89/73Abnormal   78  99 %  --  --  --  --   06/22/21 1100  --  164Abnormal   28Abnormal   89/67Abnormal   75  96 %  --  --  --  --   06/22/21 1045  --  171Abnormal   36Abnormal   107/76  88  95 %  --  --  --             Pertinent Labs/Diagnostic Test Results:       Results from last 7 days   Lab Units 06/23/21  0559 06/22/21  1043   WBC Thousand/uL 14 26* 14 38*   HEMOGLOBIN g/dL 12 2 13 2   HEMATOCRIT % 36 6 40 2   PLATELETS Thousands/uL 163 194   NEUTROS ABS Thousands/µL 10 91* 11 55*         Results from last 7 days   Lab Units 06/23/21  0610 06/22/21  1722 06/22/21  1043   SODIUM mmol/L 136 139 142   POTASSIUM mmol/L 3 9 4 0 4 3   CHLORIDE mmol/L 107 107 107   CO2 mmol/L 19* 17* 19*   ANION GAP mmol/L 10 15* 16*   BUN mg/dL 43* 48* 54*   CREATININE mg/dL 1 31* 1 52* 1 80*   EGFR ml/min/1 73sq m 42 35 29   CALCIUM mg/dL 8 6 9 0 9 5   MAGNESIUM mg/dL 1 7  --  1 9     Results from last 7 days   Lab Units 06/22/21  1722 06/22/21  1043   AST U/L 9 11   ALT U/L 13 15   ALK PHOS U/L 70 72   TOTAL PROTEIN g/dL 6 6 7 1   ALBUMIN g/dL 2 8* 3 1*   TOTAL BILIRUBIN mg/dL 0 79 0 85         Results from last 7 days   Lab Units 06/23/21  0610 06/22/21  1722 06/22/21  1043   GLUCOSE RANDOM mg/dL 117 136 124     Results from last 7 days   Lab Units 06/22/21  1043   TROPONIN I ng/mL <0 02         Results from last 7 days   Lab Units 06/22/21  1043   PROTIME seconds 25 4*   INR  2 38*   PTT seconds 34     Results from last 7 days   Lab Units 06/22/21  1128   LACTIC ACID mmol/L 1 8     Results from last 7 days   Lab Units 06/22/21  1043   NT-PRO BNP pg/mL 11,876*       CXR 6/22 - no acute  ECG   Date/Time: 6/22/2021 10:21 AM  Indications / Diagnosis:  Shortness of breath  Patient location:  ED    Interpretation: non-specific      ECG rate:  154    ECG rate assessment: tachycardic      Rhythm: atrial fibrillation      Ectopy: none      QRS axis:  Left    QRS intervals:  Normal    Conduction: normal      ST segments:  Normal    T waves: normal         ED Treatment:   Medication Administration from 06/22/2021 1014 to 06/22/2021 1426       Date/Time Order Dose Route Action Comments     06/22/2021 1047 metoprolol (LOPRESSOR) injection 5 mg 5 mg Intravenous Given      06/22/2021 1132 digoxin (LANOXIN) injection 250 mcg 250 mcg Intravenous Given      06/22/2021 1205 sodium chloride 0 9 % bolus 1,000 mL   Intravenous Restarted      06/22/2021 1157 sodium chloride 0 9 % bolus 1,000 mL 1,000 mL Intravenous New Bag      06/22/2021 1244 metoprolol (LOPRESSOR) injection 5 mg 5 mg Intravenous Given      06/22/2021 1301 metoprolol (LOPRESSOR) injection 5 mg 5 mg Intravenous Given      06/22/2021 1405 diltiazem (CARDIZEM) 125 mg in sodium chloride 0 9 % 125 mL infusion 5 mg/hr Intravenous New Bag         Past Medical History:   Diagnosis Date    COPD (chronic obstructive [facility] disease) ([facility] )     High cholesterol      Present on Admission:   New onset a-fib ([facility] )   Hyperthyroidism   KAELYN (acute kidney injury) ([facility] )   Increased anion gap metabolic acidosis   Tobacco abuse      Admitting Diagnosis: Leukocytosis [D72 829]  Atrial fibrillation with rapid ventricular response (HCC) [I48 91]  Elevated brain natriuretic peptide (BNP) level [R79 89]  Low TSH level [R79 89]  Unspecified atrial fibrillation [I48 91]  Age/Sex: 77 y o  female       Admission Orders:  Scheduled Medications:  docusate sodium, 100 mg, Oral, BID  fluticasone, 2 puff, Inhalation, Q12H SABINE  magnesium oxide, 400 mg, Oral, BID  magnesium sulfate, 2 g, Intravenous, Once 6/23 x 1  methimazole, 5 mg, Oral, Daily  metoprolol tartrate, 25 mg, Oral, Q6H - increased to 50 mg po q6 on 6/23   nicotine, 1 patch, Transdermal, Daily  rivaroxaban, 20 mg, Oral, Daily With Breakfast  Digoxin 250 mcg IV Once - 6/22 x 1  Sodium Bicarb 650 mcg po once 6/252 x 1        Continuous IV Infusions:  diltiazem, 1-15 mg/hr, Intravenous, Titrated      PRN Meds:  albuterol, 2 puff, Inhalation, Q4H PRN  ALPRAZolam, 0 5 mg, Oral, TID PRN  metoprolol, 5 mg, Intravenous, Q6H PRN - 6/22 x 2 - 6/23 x 2  ondansetron, 4 mg, Intravenous, Q6H PRN      Nursing Orders -  VS - daily weights - up & OOB as tolerated - Telem -  Diet 4 gm NA  Cardiac       Network Utilization Review Department  ATTENTION: Please call with any questions or concerns to [phone] and carefully listen to the prompts so that you are directed to the right person  All voicemails are confidential   Clint Che all requests for admission clinical reviews, approved or denied determinations and any other requests to dedicated fax number below belonging to the campus where the patient is receiving treatment   List of dedicated fax numbers for the Facilities:  [address] DENIALS (Administrative/Medical Necessity) [phone]   [address] (Maternity/NICU/Pediatrics) [phone] [address] [phone]   [address] Emily San 6134 [address] [facility] [phone] [address] [place] [address] Ameena Cardona [address] [place] [address] [phone]

## 2021-06-23 NOTE — ASSESSMENT & PLAN NOTE
TSH noted to be suppressed at < 0 01 with free T4 2 2  Endocrinology consulted and pt started on Methimazole 5mg  Heart rate control will be difficult to achieve until thyroid is corrected    Will continue to up-titrate beta blocker therapy as needed

## 2021-06-23 NOTE — ASSESSMENT & PLAN NOTE
Present on admission  [id number]=7  Patient also has been suffering with hyperthyroidism based on clinical symptoms and lab findings-most likely the cause of Afib  As per EKG patient is having AFib with RVR  Status post digoxin 250 mcg, Cardizem drip, patient also received metoprolol 5 mg x 3  Continue Cardizem drip and and titrate based on patient's heart rate, also continue beta-blocker since patient has hyperthyroidism  Continue Cardizem drip-up titrate beta-blocker as tolerated as per Cardiology recommendation    Patient will benefit from beta-blocker rather than calcium channel blocker due to hyperthyroidism  Patient already started on Xarelto  To control heart rate will be difficult until thyroid functions suppressed

## 2021-06-23 NOTE — PROGRESS NOTES
[address]  Progress Note - Gonzalo 86 1955, 77 y o  female MRN: [id number]  Unit/Bed#: -01 Encounter: [id number]  Primary Care Provider: Femi Mata DO   Date and time admitted to hospital: 6/22/2021 10:15 AM    Hyperthyroidism  Assessment & Plan  Based on clinical symptoms (inside, tremor, weight loss, hot intolerance, palpitation) with elevated free T4, low TSH level  Discussed the case via tiger text with on-call endocrinologist-recommends to start 5 mg methimazole daily and repeat TSH and free T4 in 4-6 weeks  Continue beta-blocker to control heart rate  Follow ultrasound of thyroid  Lipid panel normal  Check thyroid antibodies panel    * New onset a-fib [facility])  Assessment & Plan  Present on admission  [id number]=2  Patient also has been suffering with hyperthyroidism based on clinical symptoms and lab findings-most likely the cause of Afib  As per EKG patient is having AFib with RVR  Status post digoxin 250 mcg, Cardizem drip, patient also received metoprolol 5 mg x 3  Continue Cardizem drip and and titrate based on patient's heart rate, also continue beta-blocker since patient has hyperthyroidism  Continue Cardizem drip-up titrate beta-blocker as tolerated as per Cardiology recommendation    Patient will benefit from beta-blocker rather than calcium channel blocker due to hyperthyroidism  Patient already started on Xarelto  To control heart rate will be difficult until thyroid functions suppressed    KAELYN (acute kidney injury) ([facility] [address] )  Assessment & Plan  Baseline creatinine is 1 2  Continue to improve-today creatinine is 1 31  Continue to monitor    Tobacco abuse  Assessment & Plan  Patient is cutting it down  Continue to encourage    Increased anion gap metabolic acidosis  Assessment & Plan  Anion gap normalized  Could be secondary to KAELYN  Patient does have AFib, tachypneic elevated WBC-but no known source of infection, most likely secondary to hypothyroidism        VTE Pharmacologic Prophylaxis:   Pharmacologic: Rivaroxaban (Xarelto)  Mechanical VTE Prophylaxis in Place: Yes    Patient Centered Rounds: I have performed bedside rounds with nursing staff today  Discussions with Specialists or Other Care Team Provider:  Cardiology    Education and Discussions with Family / Patient:  Yes with patient    Time Spent for Care: 15 minutes  More than 50% of total time spent on counseling and coordination of care as described above  Current Length of Stay: 1 day(s)    Current Patient Status: Inpatient   Certification Statement: The patient will continue to require additional inpatient hospital stay due to AFib, hypothyroidism    Discharge Plan / Estimated Discharge Date: To be determined      Code Status: Level 1 - Full Code      Subjective:   Seen and evaluated during the round  Patient is still feeling tired and fatigued  Objective:     Vitals:   Temp (24hrs), Av 3 °F (36 8 °C), Min:97 4 °F (36 3 °C), Max:99 4 °F (37 4 °C)    Temp:  [97 4 °F (36 3 °C)-99 4 °F (37 4 °C)] 98 6 °F (37 °C)  HR:  [130-156] 150  Resp:  [21-34] 27  BP: ()/() 120/84  SpO2:  [93 %-96 %] 94 %  Body mass index is 37 01 kg/m²  Input and Output Summary (last 24 hours): Intake/Output Summary (Last 24 hours) at 2021 1533  Last data filed at 2021 1431  Gross per 24 hour   Intake 845 63 ml   Output 1625 ml   Net -779 37 ml       Physical Exam:     Physical Exam  Vitals and nursing note reviewed  Exam conducted with a chaperone present  Constitutional:       Appearance: She is not diaphoretic  HENT:      Head: Normocephalic  Nose: No congestion  Mouth/Throat:      Mouth: Mucous membranes are moist       Pharynx: No oropharyngeal exudate  Eyes:      General: No scleral icterus  Conjunctiva/sclera: Conjunctivae normal       Pupils: Pupils are equal, round, and reactive to light     Cardiovascular:      Rate and Rhythm: Tachycardia present  Rhythm irregular  Heart sounds: No friction rub  No gallop  Pulmonary:      Effort: Pulmonary effort is normal  No respiratory distress  Breath sounds: No stridor  No wheezing or rhonchi  Abdominal:      General: Abdomen is flat  Bowel sounds are normal  There is no distension  Palpations: There is no mass  Tenderness: There is no abdominal tenderness  Hernia: No hernia is present  Musculoskeletal:         General: Normal range of motion  Cervical back: Normal range of motion  Right lower leg: No edema  Left lower leg: No edema  Lymphadenopathy:      Cervical: No cervical adenopathy  Skin:     General: Skin is warm  Neurological:      General: No focal deficit present  Mental Status: She is alert and oriented to person, place, and time  Cranial Nerves: No cranial nerve deficit  Sensory: No sensory deficit  Psychiatric:         Mood and Affect: Mood normal          Additional Data:     Labs:    Results from last 7 days   Lab Units 06/23/21  0559   WBC Thousand/uL 14 26*   HEMOGLOBIN g/dL 12 2   HEMATOCRIT % 36 6   PLATELETS Thousands/uL 163   NEUTROS PCT % 77*   LYMPHS PCT % 11*   MONOS PCT % 12   EOS PCT % 0     Results from last 7 days   Lab Units 06/23/21  0610 06/22/21  1722   POTASSIUM mmol/L 3 9 4 0   CHLORIDE mmol/L 107 107   CO2 mmol/L 19* 17*   BUN mg/dL 43* 48*   CREATININE mg/dL 1 31* 1 52*   CALCIUM mg/dL 8 6 9 0   ALK PHOS U/L  --  70   ALT U/L  --  13   AST U/L  --  9     Results from last 7 days   Lab Units 06/22/21  1043   INR  2 38*       * I Have Reviewed All Lab Data Listed Above  * Additional Pertinent Lab Tests Reviewed:  All Labs Within Last 24 Hours Reviewed      Last 24 Hours Medication List:   Current Facility-Administered Medications   Medication Dose Route Frequency Provider Last Rate    albuterol  2 puff Inhalation Q4H PRN Tristen Echevarria MD      ALPRAZolam  0 5 mg Oral TID PRN MD Darci Hansen diltiazem  1-15 mg/hr Intravenous Titrated Elis Guzman MD Stopped (06/23/21 1000)    docusate sodium  100 mg Oral BID Elis Guzman MD      fluticasone  2 puff Inhalation Q12H [facility] Elis Guzman MD      magnesium oxide  400 mg Oral BID Elis Guzman MD      methimazole  5 mg Oral Daily Elis Guzman MD      metoprolol  5 mg Intravenous Q6H PRN Elis Guzman MD      metoprolol tartrate  50 mg Oral Q6H LINDA Mccabe      nicotine  1 patch Transdermal Daily Elis Guzman MD      ondansetron  4 mg Intravenous Q6H PRN Elis Guzman MD      rivaroxaban  20 mg Oral Daily With Breakfast Elis Guzman MD          Today, Patient Was Seen By: Elis Guzman MD    ** Please Note: Dragon 360 Dictation voice to text software may have been used in the creation of this document   **

## 2021-06-23 NOTE — ASSESSMENT & PLAN NOTE
Anion gap normalized  Could be secondary to KAELYN  Patient does have AFib, tachypneic elevated WBC-but no known source of infection, most likely secondary to hypothyroidism

## 2021-06-23 NOTE — PLAN OF CARE
Problem: Potential for Falls  Goal: Patient will remain free of falls  Description: INTERVENTIONS:  - Educate patient/family on patient safety including physical limitations  - Instruct patient to call for assistance with activity   - Consult OT/PT to assist with strengthening/mobility   - Keep Call bell within reach  - Keep bed low and locked with side rails adjusted as appropriate  - Keep care items and personal belongings within reach  - Initiate and maintain comfort rounds  - Make Fall Risk Sign visible to staff  - Offer Toileting every 2Hours, in advance of need  -  - Obtain necessary fall risk management equipment:  - Apply yellow socks and bracelet for high fall risk patients  - Consider moving patient to room near nurses station  6/22/2021 2053 by Filiberto Almeida RN  Outcome: Progressing  6/22/2021 2053 by Filiberto Almeida RN  Outcome: Progressing

## 2021-06-23 NOTE — ASSESSMENT & PLAN NOTE
Patient presented to her PCP office on 6/21/21 with c/o shortness of breath on exertion since May and found to be in new onset A fib with RVR  PCP ordered labs, started Xarelto, and switched her Atenolol to Metoprolol  She was referred to [facility] - pt seen outpatient by [facility] on 6/22/21 and again found to be in A Fib with RVR and shortness of breath  Outpatient labs - TSH <0 01, BNP 9997, Creatinine 2 1, K+ 5 2  She was taken via ambulance to [facility] ER  In ER she received IV Lopressor 5mg x3 doses, Digoxin 250mg IV x 1 with no HR response  She was started on Diltiazem gtt and endocrinology consulted for the suppressed TSH  She was started on Methimazole 5mg and Metoprolol 25mg PO QID  Heart rate remains elevated in 140's despite extra dose of Lopressor 5mg and Dig 250mg IV  Heart rate control complicated by suppressed TSH     Transitioned from Metoprolol to Propranolol 80mg PO Q6 on 6/24/21   Will trial increase to Propranolol 120mg PO Q6  Will continue to adjust medication for heart rate control as needed  Continue Xarelto  Optimize electrolytes with K+ > 4, Mag > 2

## 2021-06-24 LAB
ALBUMIN SERPL BCP-MCNC: 2.5 G/DL (ref 3.5–5)
ALP SERPL-CCNC: 67 U/L (ref 46–116)
ALT SERPL W P-5'-P-CCNC: 12 U/L (ref 12–78)
ANION GAP SERPL CALCULATED.3IONS-SCNC: 10 MMOL/L (ref 4–13)
AST SERPL W P-5'-P-CCNC: 10 U/L (ref 5–45)
BASOPHILS # BLD AUTO: 0.04 THOUSANDS/ΜL (ref 0–0.1)
BASOPHILS NFR BLD AUTO: 0 % (ref 0–1)
BILIRUB SERPL-MCNC: 0.83 MG/DL (ref 0.2–1)
BUN SERPL-MCNC: 34 MG/DL (ref 5–25)
CALCIUM ALBUM COR SERPL-MCNC: 10.1 MG/DL (ref 8.3–10.1)
CALCIUM SERPL-MCNC: 8.9 MG/DL (ref 8.3–10.1)
CHLORIDE SERPL-SCNC: 107 MMOL/L (ref 100–108)
CO2 SERPL-SCNC: 20 MMOL/L (ref 21–32)
CREAT SERPL-MCNC: 1.32 MG/DL (ref 0.6–1.3)
EOSINOPHIL # BLD AUTO: 0.02 THOUSAND/ΜL (ref 0–0.61)
EOSINOPHIL NFR BLD AUTO: 0 % (ref 0–6)
ERYTHROCYTE [DISTWIDTH] IN BLOOD BY AUTOMATED COUNT: 13.5 % (ref 11.6–15.1)
GFR SERPL CREATININE-BSD FRML MDRD: 42 ML/MIN/1.73SQ M
GLUCOSE SERPL-MCNC: 113 MG/DL (ref 65–140)
HCT VFR BLD AUTO: 35.4 % (ref 34.8–46.1)
HGB BLD-MCNC: 11.7 G/DL (ref 11.5–15.4)
IMM GRANULOCYTES # BLD AUTO: 0.05 THOUSAND/UL (ref 0–0.2)
IMM GRANULOCYTES NFR BLD AUTO: 0 % (ref 0–2)
LYMPHOCYTES # BLD AUTO: 1.98 THOUSANDS/ΜL (ref 0.6–4.47)
LYMPHOCYTES NFR BLD AUTO: 15 % (ref 14–44)
MAGNESIUM SERPL-MCNC: 1.8 MG/DL (ref 1.6–2.6)
MCH RBC QN AUTO: 30.3 PG (ref 26.8–34.3)
MCHC RBC AUTO-ENTMCNC: 33.1 G/DL (ref 31.4–37.4)
MCV RBC AUTO: 92 FL (ref 82–98)
MONOCYTES # BLD AUTO: 1.57 THOUSAND/ΜL (ref 0.17–1.22)
MONOCYTES NFR BLD AUTO: 12 % (ref 4–12)
NEUTROPHILS # BLD AUTO: 9.44 THOUSANDS/ΜL (ref 1.85–7.62)
NEUTS SEG NFR BLD AUTO: 73 % (ref 43–75)
NRBC BLD AUTO-RTO: 0 /100 WBCS
PLATELET # BLD AUTO: 167 THOUSANDS/UL (ref 149–390)
PMV BLD AUTO: 10.5 FL (ref 8.9–12.7)
POTASSIUM SERPL-SCNC: 3.9 MMOL/L (ref 3.5–5.3)
PROT SERPL-MCNC: 6.3 G/DL (ref 6.4–8.2)
RBC # BLD AUTO: 3.86 MILLION/UL (ref 3.81–5.12)
SODIUM SERPL-SCNC: 137 MMOL/L (ref 136–145)
THYROGLOB AB SERPL-ACNC: <1 IU/ML (ref 0–0.9)
THYROPEROXIDASE AB SERPL-ACNC: <9 IU/ML (ref 0–34)
WBC # BLD AUTO: 13.1 THOUSAND/UL (ref 4.31–10.16)

## 2021-06-24 PROCEDURE — 99232 SBSQ HOSP IP/OBS MODERATE 35: CPT | Performed by: FAMILY MEDICINE

## 2021-06-24 PROCEDURE — 80053 COMPREHEN METABOLIC PANEL: CPT | Performed by: FAMILY MEDICINE

## 2021-06-24 PROCEDURE — 99232 SBSQ HOSP IP/OBS MODERATE 35: CPT | Performed by: INTERNAL MEDICINE

## 2021-06-24 PROCEDURE — 85025 COMPLETE CBC W/AUTO DIFF WBC: CPT | Performed by: FAMILY MEDICINE

## 2021-06-24 PROCEDURE — 83735 ASSAY OF MAGNESIUM: CPT | Performed by: FAMILY MEDICINE

## 2021-06-24 RX ORDER — DIGOXIN 125 MCG
125 TABLET ORAL ONCE
Status: DISCONTINUED | OUTPATIENT
Start: 2021-06-24 | End: 2021-06-24

## 2021-06-24 RX ORDER — METOPROLOL TARTRATE 50 MG/1
100 TABLET, FILM COATED ORAL EVERY 6 HOURS
Status: DISCONTINUED | OUTPATIENT
Start: 2021-06-24 | End: 2021-06-24

## 2021-06-24 RX ORDER — PROPRANOLOL HYDROCHLORIDE 20 MG/1
80 TABLET ORAL EVERY 8 HOURS SCHEDULED
Status: DISCONTINUED | OUTPATIENT
Start: 2021-06-24 | End: 2021-06-24

## 2021-06-24 RX ORDER — PROPRANOLOL HYDROCHLORIDE 20 MG/1
80 TABLET ORAL EVERY 6 HOURS SCHEDULED
Status: DISCONTINUED | OUTPATIENT
Start: 2021-06-25 | End: 2021-06-25

## 2021-06-24 RX ADMIN — PROPRANOLOL HYDROCHLORIDE 80 MG: 20 TABLET ORAL at 20:52

## 2021-06-24 RX ADMIN — METOPROLOL TARTRATE 100 MG: 50 TABLET, FILM COATED ORAL at 17:06

## 2021-06-24 RX ADMIN — FLUTICASONE PROPIONATE 2 PUFF: 110 AEROSOL, METERED RESPIRATORY (INHALATION) at 08:32

## 2021-06-24 RX ADMIN — DOCUSATE SODIUM 100 MG: 100 CAPSULE, LIQUID FILLED ORAL at 17:06

## 2021-06-24 RX ADMIN — Medication 400 MG: at 08:32

## 2021-06-24 RX ADMIN — METHIMAZOLE 5 MG: 5 TABLET ORAL at 08:33

## 2021-06-24 RX ADMIN — Medication 1 PATCH: at 08:32

## 2021-06-24 RX ADMIN — METOROPROLOL TARTRATE 5 MG: 5 INJECTION, SOLUTION INTRAVENOUS at 02:20

## 2021-06-24 RX ADMIN — DILTIAZEM HYDROCHLORIDE 30 MG: 30 TABLET, FILM COATED ORAL at 09:31

## 2021-06-24 RX ADMIN — METOPROLOL TARTRATE 75 MG: 50 TABLET, FILM COATED ORAL at 05:36

## 2021-06-24 RX ADMIN — Medication 400 MG: at 17:06

## 2021-06-24 RX ADMIN — RIVAROXABAN 20 MG: 20 TABLET, FILM COATED ORAL at 08:32

## 2021-06-24 RX ADMIN — DOCUSATE SODIUM 100 MG: 100 CAPSULE, LIQUID FILLED ORAL at 08:32

## 2021-06-24 RX ADMIN — FLUTICASONE PROPIONATE 2 PUFF: 110 AEROSOL, METERED RESPIRATORY (INHALATION) at 20:52

## 2021-06-24 RX ADMIN — METOPROLOL TARTRATE 100 MG: 50 TABLET, FILM COATED ORAL at 11:52

## 2021-06-24 NOTE — PROGRESS NOTES
Progress Note:Cardiology  Dione Santamaria 1955, 77 y o  female MRN: [id number]    Unit/Bed#: -01 Encounter: [id number]  Attending Physician: Robin Osullivan MD   Primary Care Provider: Keenan Ha DO   Date admitted to hospital: 6/22/2021  Length of stay: 2         * New onset a-fib [facility])  Assessment & Plan  Patient presented to her PCP office on 6/21/21 with c/o shortness of breath on exertion since May and found to be in new onset A fib with RVR  PCP ordered labs, started Xarelto, and switched her Atenolol to Metoprolol  She was referred to [facility] - pt seen outpatient by [facility] on 6/22/21 and again found to be in A Fib with RVR and shortness of breath  Outpatient labs - TSH <0 01, BNP 9997, Creatinine 2 1, K+ 5 2  She was taken via ambulance to [address] [place] [address]  In ER she received IV Lopressor 5mg x3 doses, Digoxin 250mg IV x 1 with no HR response  She was started on Diltiazem gtt and endocrinology consulted for the suppressed TSH  She was started on Methimazole 5mg and Metoprolol 25mg PO QID  Heart rate remains elevated in 140's despite extra dose of Lopressor 5mg and Dig 250mg IV  Heart rate control complicated by suppressed TSH  Will continue to titrate Metoprolol as needed  Increase Metoprolol to 100mg every 6 hrs  Additional Diltiazem 30mg now x 1 dose  Will continue to adjust medication for heart rate control as needed  Continue Xarelto  Optimize electrolytes with K+ > 4, Mag > 2        Hyperthyroidism  Assessment & Plan  TSH noted to be suppressed at < 0 01 with free T4 2 2  Endocrinology consulted and pt started on Methimazole 5mg  Heart rate control will be difficult to achieve until thyroid is corrected  Will continue to up-titrate beta blocker therapy as needed        Subjective:   Patient seen and examined  No significant events overnight  Aida Lisa denies any new complaints today  She states that she is starting to feel somewhat better today   She remains short of breath but she feels this is improving  She denies any chest pain/pressure, lightheadedness/dizziness  She denies sensation of racing heart/palpitations  She did receive additional IV doses of Lopressor overnight, which did not improve her heart rate  Review of Systems   Constitutional: Negative  HENT: Negative  Cardiovascular: Positive for dyspnea on exertion  Negative for chest pain, irregular heartbeat, leg swelling, near-syncope, orthopnea and palpitations  Respiratory: Positive for shortness of breath  Negative for cough and snoring  Endocrine: Negative  Skin: Negative  Musculoskeletal: Negative  Gastrointestinal: Negative  Genitourinary: Negative  Neurological: Negative  Psychiatric/Behavioral: Negative  Objective:     Vitals: Blood pressure 131/90, pulse (!) 160, temperature 98 5 °F (36 9 °C), temperature source Axillary, resp  rate (!) 28, height 5' 8" (1 727 m), weight 110 kg (243 lb 9 7 oz), SpO2 93 %  , Body mass index is 37 04 kg/m² ,     Orthostatic Blood Pressures      Most Recent Value   Blood Pressure  131/90 filed at 06/24/2021 1102   Patient Position - Orthostatic VS  Lying filed at 06/24/2021 1102          Physical Exam  Vitals and nursing note reviewed  Constitutional:       General: She is not in acute distress  Appearance: She is well-developed  HENT:      Head: Normocephalic and atraumatic  Eyes:      Conjunctiva/sclera: Conjunctivae normal    Neck:      Vascular: No carotid bruit or JVD  Cardiovascular:      Rate and Rhythm: Tachycardia present  Rhythm irregular  Heart sounds: No murmur heard  Comments: No leg edema today  Pulmonary:      Effort: Pulmonary effort is normal  No respiratory distress  Breath sounds: Normal breath sounds  Abdominal:      Palpations: Abdomen is soft  Tenderness: There is no abdominal tenderness  Musculoskeletal:      Cervical back: Neck supple     Skin:     General: Skin is warm and dry  Neurological:      Mental Status: She is alert and oriented to person, place, and time  Psychiatric:         Mood and Affect: Mood normal          Speech: Speech normal          Behavior: Behavior normal  Behavior is cooperative              Intake/Output Summary (Last 24 hours) at 6/24/2021 1227  Last data filed at 6/24/2021 1102  Gross per 24 hour   Intake 360 ml   Output 1100 ml   Net -740 ml       Weight (last 2 days)     Date/Time   Weight    06/24/21 0541   110 (243 61)    06/23/21 0330   110 (243 39)    06/22/21 1427   111 (244 49)    06/22/21 1025   118 (259 92)                 Medications:      Current Facility-Administered Medications:     albuterol (PROVENTIL HFA,VENTOLIN HFA) inhaler 2 puff, 2 puff, Inhalation, Q4H PRN, Tara Lenz MD    ALPRAZolam [place]) tablet 0 5 mg, 0 5 mg, Oral, TID PRN, Jah Weinberg MD    diltiazem (CARDIZEM) 125 mg in sodium chloride 0 9 % 125 mL infusion, 1-15 mg/hr, Intravenous, Titrated, Jah Weinberg MD, Held at 06/23/21 1000    docusate sodium (COLACE) capsule 100 mg, 100 mg, Oral, BID, Tara Lenz MD, 100 mg at 06/24/21 0832    fluticasone (FLOVENT HFA) 110 MCG/ACT inhaler 2 puff, 2 puff, Inhalation, Q12H [address], Tara Lenz MD, 2 puff at 06/24/21 1098    magnesium oxide (MAG-OX) tablet 400 mg, 400 mg, Oral, BID, Tara Lenz MD, 400 mg at 06/24/21 8385    methimazole (TAPAZOLE) tablet 5 mg, 5 mg, Oral, Daily, aTra Lenz MD, 5 mg at 06/24/21 0833    metoprolol (LOPRESSOR) injection 5 mg, 5 mg, Intravenous, Q6H PRN, Tara Lenz MD, 5 mg at 06/24/21 0220    metoprolol tartrate (LOPRESSOR) tablet 100 mg, 100 mg, Oral, Q6H, LINDA [place], 100 mg at 06/24/21 1152    nicotine (NICODERM CQ) 14 mg/24hr TD 24 hr patch 1 patch, 1 patch, Transdermal, Daily, Jah Weinberg MD, 1 patch at 06/24/21 0832    ondansetron (ZOFRAN) injection 4 mg, 4 mg, Intravenous, Q6H PRN, Jah Weinberg MD    rivaroxaban (XARELTO) tablet 20 mg, 20 mg, Oral, Daily With Breakfast, Westley Hughes MD, 20 mg at 06/24/21 0832     Labs & Results:    Results from last 7 days   Lab Units 06/22/21  1043   TROPONIN I ng/mL <0 02     Results from last 7 days   Lab Units 06/24/21  0433 06/23/21  0559 06/22/21  1043   WBC Thousand/uL 13 10* 14 26* 14 38*   HEMOGLOBIN g/dL 11 7 12 2 13 2   HEMATOCRIT % 35 4 36 6 40 2   PLATELETS Thousands/uL 167 163 194         Results from last 7 days   Lab Units 06/24/21  0433 06/23/21  0610 06/22/21  1722 06/22/21  1043   POTASSIUM mmol/L 3 9 3 9 4 0 4 3   CHLORIDE mmol/L 107 107 107 107   CO2 mmol/L 20* 19* 17* 19*   BUN mg/dL 34* 43* 48* 54*   CREATININE mg/dL 1 32* 1 31* 1 52* 1 80*   CALCIUM mg/dL 8 9 8 6 9 0 9 5   ALK PHOS U/L 67  --  70 72   ALT U/L 12  --  13 15   AST U/L 10  --  9 11     Results from last 7 days   Lab Units 06/22/21  1043   INR  2 38*   PTT seconds 34     Results from last 7 days   Lab Units 06/24/21  0433 06/23/21  0610 06/22/21  1043   MAGNESIUM mg/dL 1 8 1 7 1 9     Results from last 7 days   Lab Units 06/22/21  1043   NT-PRO BNP pg/mL 11,876*      Telemetry: atrial fibrillation, rates 140's-170's    Counseling / Coordination of Care  Total floor / unit time spent today 25 minutes  Greater than 50% of total time was spent with the patient and / or family counseling and / or coordination of care    A description of the counseling / coordination of care: discussed case with Dr Loreen Seip

## 2021-06-24 NOTE — ASSESSMENT & PLAN NOTE
Based on clinical symptoms (inside, tremor, weight loss, hot intolerance, palpitation) with elevated free T4, low TSH level  Discussed the case via tiger text with on-call endocrinologist-recommends to start 5 mg methimazole daily and repeat TSH and free T4 in 4-6 weeks  Continue beta-blocker to control heart rate   thyroid ultrasound reviewed  Lipid panel normal  Check thyroid antibodies panel

## 2021-06-24 NOTE — ASSESSMENT & PLAN NOTE
Present on admission  [id number]=8  Patient also has been suffering with hyperthyroidism based on clinical symptoms and lab findings-most likely the cause of Afib  As per EKG patient is having AFib with RVR  Status post digoxin 250 mcg, Cardizem drip, patient also received metoprolol 5 mg x 3 In the ER, later on in the floor, patient received extra dose of digoxin, Cardizem in addition to Cardizem drip  Continue Cardizem drip-up titrate beta-blocker as tolerated as per Cardiology recommendation    Patient will benefit from beta-blocker rather than calcium channel blocker due to hyperthyroidism   metoprolol dose increased to 100 mg q 6 hour as per Cardiology recommendation  Patient already started on Xarelto  To control heart rate will be difficult until thyroid functions suppressed    Cardiology consult appre

## 2021-06-24 NOTE — NURSING NOTE
Pt's heart rate continues to be elevated while resting in bed, SOB with exertion and at rest   Denies any other complaints  PRN 5mg of IV lopressor given @ 19:55 with no change in HR  HR remains 130-150s a fib  [address] notified and new order obtained for another one time dose of 5mg of IV lopressor @ 20:32  Pt's heart rate continues to be elevated  Discussed cardizem gtt that is ordered continue to hold med

## 2021-06-24 NOTE — PLAN OF CARE
Problem: Potential for Falls  Goal: Patient will remain free of falls  Description: INTERVENTIONS:  - Educate patient/family on patient safety including physical limitations  - Instruct patient to call for assistance with activity   - Consult OT/PT to assist with strengthening/mobility   - Keep Call bell within reach  - Keep bed low and locked with side rails adjusted as appropriate  - Keep care items and personal belongings within reach  - Initiate and maintain comfort rounds  - Make Fall Risk Sign visible to staff  - Offer Toileting every in advance of need  - Initiate/Maintain alarm  - Obtain necessary fall risk management equipment  - Apply yellow socks and bracelet for high fall risk patients  - Consider moving patient to room near nurses station  Outcome: Progressing     Problem: MOBILITY - ADULT  Goal: Maintain or return to baseline ADL function  Description: INTERVENTIONS:  -  Assess patient's ability to carry out ADLs; assess patient's baseline for ADL function and identify physical deficits which impact ability to perform ADLs (bathing, care of mouth/teeth, toileting, grooming, dressing, etc )  - Assess/evaluate cause of self-care deficits   - Assess range of motion  - Assess patient's mobility; develop plan if impaired  - Assess patient's need for assistive devices and provide as appropriate  - Encourage maximum independence but intervene and supervise when necessary  - Involve family in performance of ADLs  - Assess for home care needs following discharge   - Consider OT consult to assist with ADL evaluation and planning for discharge  - Provide patient education as appropriate  Outcome: Progressing  Goal: Maintains/Returns to pre admission functional level  Description: INTERVENTIONS:  - Perform BMAT or MOVE assessment daily    - Set and communicate daily mobility goal to care team and patient/family/caregiver     - Collaborate with rehabilitation services on mobility goals if consulted  - Perform Range of Motion   - Reposition patient   - Dangle patient   - Stand patient   - Ambulate patient  - Out of bed to chair  - Out of bed for meals  - Out of bed for toileting  - Record patient progress and toleration of activity level   Outcome: Progressing     Problem: CARDIOVASCULAR - ADULT  Goal: Maintains optimal cardiac output and hemodynamic stability  Description: INTERVENTIONS:  - Monitor I/O, vital signs and rhythm  - Monitor for S/S and trends of decreased cardiac output  - Administer and titrate ordered vasoactive medications to optimize hemodynamic stability  - Assess quality of pulses, skin color and temperature  - Assess for signs of decreased coronary artery perfusion  - Instruct patient to report change in severity of symptoms  Outcome: Progressing  Goal: Absence of cardiac dysrhythmias or at baseline rhythm  Description: INTERVENTIONS:  - Continuous cardiac monitoring, vital signs, obtain 12 lead EKG if ordered  - Administer antiarrhythmic and heart rate control medications as ordered  - Monitor electrolytes and administer replacement therapy as ordered  Outcome: Progressing     Problem: INFECTION - ADULT  Goal: Absence or prevention of progression during hospitalization  Description: INTERVENTIONS:  - Assess and monitor for signs and symptoms of infection  - Monitor lab/diagnostic results  - Monitor all insertion sites, i e  indwelling lines, tubes, and drains  - Monitor endotracheal if appropriate and nasal secretions for changes in amount and color  - [place] appropriate cooling/warming therapies per order  - Administer medications as ordered  - Instruct and encourage patient and family to use good hand hygiene technique  - Identify and instruct in appropriate isolation precautions for identified infection/condition  Outcome: Progressing     Problem: DISCHARGE PLANNING  Goal: Discharge to home or other facility with appropriate resources  Description: INTERVENTIONS:  - Identify barriers to discharge w/patient and caregiver  - Arrange for needed discharge resources and transportation as appropriate  - Identify discharge learning needs (meds, wound care, etc )  - Arrange for interpretive services to assist at discharge as needed  - Refer to Case Management Department for coordinating discharge planning if the patient needs post-hospital services based on physician/advanced practitioner order or complex needs related to functional status, cognitive ability, or social support system  Outcome: Progressing     Problem: Knowledge Deficit  Goal: Patient/family/caregiver demonstrates understanding of disease process, treatment plan, medications, and discharge instructions  Description: Complete learning assessment and assess knowledge base    Interventions:  - Provide teaching at level of understanding  - Provide teaching via preferred learning methods  Outcome: Progressing

## 2021-06-24 NOTE — PROGRESS NOTES
[address]  Progress Note - Gonzalo 86 1955, 77 y o  female MRN: [id number]  Unit/Bed#: -01 Encounter: [id number]  Primary Care Provider: George Marie DO   Date and time admitted to hospital: 6/22/2021 10:15 AM    Hyperthyroidism  Assessment & Plan  Based on clinical symptoms (inside, tremor, weight loss, hot intolerance, palpitation) with elevated free T4, low TSH level  Discussed the case via tiger text with on-call endocrinologist-recommends to start 5 mg methimazole daily and repeat TSH and free T4 in 4-6 weeks  Continue beta-blocker to control heart rate   thyroid ultrasound reviewed  Lipid panel normal  Check thyroid antibodies panel    * New onset a-fib [facility])  Assessment & Plan  Present on admission  [id number]=5  Patient also has been suffering with hyperthyroidism based on clinical symptoms and lab findings-most likely the cause of Afib  As per EKG patient is having AFib with RVR  Status post digoxin 250 mcg, Cardizem drip, patient also received metoprolol 5 mg x 3 In the ER, later on in the floor, patient received extra dose of digoxin, Cardizem in addition to Cardizem drip  Continue Cardizem drip-up titrate beta-blocker as tolerated as per Cardiology recommendation    Patient will benefit from beta-blocker rather than calcium channel blocker due to hyperthyroidism   metoprolol dose increased to 100 mg q 6 hour as per Cardiology recommendation  Patient already started on Xarelto  To control heart rate will be difficult until thyroid functions suppressed    Cardiology consult appre    KAELYN (acute kidney injury) ([facility] [address] )  Assessment & Plan  Baseline creatinine is 1 2   creatinine r  Continue to monitor    Tobacco abuse  Assessment & Plan  Patient is cutting it down  Continue to encourage    Increased anion gap metabolic acidosis  Assessment & Plan  Anion gap normalized  Could be secondary to KAELYN  Patient does have AFib, tachypneic elevated WBC-but no known source of infection, most likely secondary to hypothyroidism          VTE Pharmacologic Prophylaxis:   Pharmacologic: Rivaroxaban (Xarelto)  Mechanical VTE Prophylaxis in Place: Yes    Patient Centered Rounds: I have performed bedside rounds with nursing staff today  Discussions with Specialists or Other Care Team Provider:  cardiology    Education and Discussions with Family / Patient:  Yes with patient    Time Spent for Care: 15 minutes  More than 50% of total time spent on counseling and coordination of care as described above  Current Length of Stay: 2 day(s)    Current Patient Status: Inpatient   Certification Statement: The patient will continue to require additional inpatient hospital stay due to New onset atrial fibrillation,  hyper thyroidism    Discharge Plan / Estimated Discharge Date: To be determined      Code Status: Level 1 - Full Code      Subjective:    seen and evaluated during the round  Reports her shortness of breath is mildly improved  Still having palpitation  Objective:     Vitals:   Temp (24hrs), Av 4 °F (36 9 °C), Min:97 4 °F (36 3 °C), Max:99 1 °F (37 3 °C)    Temp:  [97 4 °F (36 3 °C)-99 1 °F (37 3 °C)] 98 2 °F (36 8 °C)  HR:  [130-158] 145  Resp:  [25-35] 25  BP: ()/(64-99) 129/78  SpO2:  [94 %-96 %] 95 %  Body mass index is 37 04 kg/m²  Input and Output Summary (last 24 hours): Intake/Output Summary (Last 24 hours) at 2021 1022  Last data filed at 2021 0736  Gross per 24 hour   Intake 360 ml   Output 1150 ml   Net -790 ml       Physical Exam:     Physical Exam  Vitals and nursing note reviewed  Constitutional:       Appearance: She is not diaphoretic  HENT:      Nose: No congestion  Mouth/Throat:      Mouth: Mucous membranes are moist       Pharynx: No oropharyngeal exudate  Eyes:      General: No scleral icterus  Conjunctiva/sclera: Conjunctivae normal       Pupils: Pupils are equal, round, and reactive to light  Cardiovascular:      Rate and Rhythm: Tachycardia present  Rhythm irregular  Heart sounds: No gallop  Pulmonary:      Effort: Pulmonary effort is normal  No respiratory distress  Breath sounds: No stridor  No wheezing  Abdominal:      General: Abdomen is flat  Bowel sounds are normal  There is no distension  Palpations: There is no mass  Tenderness: There is no abdominal tenderness  Hernia: No hernia is present  Musculoskeletal:         General: Normal range of motion  Cervical back: Normal range of motion  Right lower leg: No edema  Lymphadenopathy:      Cervical: No cervical adenopathy  Skin:     General: Skin is warm  Capillary Refill: Capillary refill takes less than 2 seconds  Neurological:      General: No focal deficit present  Mental Status: She is alert and oriented to person, place, and time  Cranial Nerves: No cranial nerve deficit  Sensory: No sensory deficit  Motor: No weakness  Coordination: Coordination normal        Additional Data:     Labs:    Results from last 7 days   Lab Units 06/24/21  0433   WBC Thousand/uL 13 10*   HEMOGLOBIN g/dL 11 7   HEMATOCRIT % 35 4   PLATELETS Thousands/uL 167   NEUTROS PCT % 73   LYMPHS PCT % 15   MONOS PCT % 12   EOS PCT % 0     Results from last 7 days   Lab Units 06/24/21  0433   POTASSIUM mmol/L 3 9   CHLORIDE mmol/L 107   CO2 mmol/L 20*   BUN mg/dL 34*   CREATININE mg/dL 1 32*   CALCIUM mg/dL 8 9   ALK PHOS U/L 67   ALT U/L 12   AST U/L 10     Results from last 7 days   Lab Units 06/22/21  1043   INR  2 38*       * I Have Reviewed All Lab Data Listed Above  * Additional Pertinent Lab Tests Reviewed:  All Labs Within Last 24 Hours Reviewed      Last 24 Hours Medication List:   Current Facility-Administered Medications   Medication Dose Route Frequency Provider Last Rate    albuterol  2 puff Inhalation Q4H PRN Westley Hughes MD      ALPRAZolam  0 5 mg Oral TID PRN Aminta Lenz MD      diltiazem  1-15 mg/hr Intravenous Titrated Chula Green MD Stopped (06/23/21 1000)    docusate sodium  100 mg Oral BID Chula Green MD      fluticasone  2 puff Inhalation Q12H [address] Chula Green MD      magnesium oxide  400 mg Oral BID Chula Green MD      methimazole  5 mg Oral Daily Chula Green MD      metoprolol  5 mg Intravenous Q6H PRN Chula Green MD      metoprolol tartrate  100 mg Oral Q6H LINDA Blanco      nicotine  1 patch Transdermal Daily Chula Green MD      ondansetron  4 mg Intravenous Q6H PRN Chula Green MD      rivaroxaban  20 mg Oral Daily With Breakfast Chula Green MD          Today, Patient Was Seen By: Chula Green MD    ** Please Note: Dragon 360 Dictation voice to text software may have been used in the creation of this document   **

## 2021-06-25 LAB
ANION GAP SERPL CALCULATED.3IONS-SCNC: 10 MMOL/L (ref 4–13)
BASOPHILS # BLD AUTO: 0.03 THOUSANDS/ΜL (ref 0–0.1)
BASOPHILS NFR BLD AUTO: 0 % (ref 0–1)
BUN SERPL-MCNC: 33 MG/DL (ref 5–25)
CALCIUM SERPL-MCNC: 8.8 MG/DL (ref 8.3–10.1)
CHLORIDE SERPL-SCNC: 105 MMOL/L (ref 100–108)
CO2 SERPL-SCNC: 21 MMOL/L (ref 21–32)
CREAT SERPL-MCNC: 1.3 MG/DL (ref 0.6–1.3)
EOSINOPHIL # BLD AUTO: 0.04 THOUSAND/ΜL (ref 0–0.61)
EOSINOPHIL NFR BLD AUTO: 0 % (ref 0–6)
ERYTHROCYTE [DISTWIDTH] IN BLOOD BY AUTOMATED COUNT: 13.4 % (ref 11.6–15.1)
GFR SERPL CREATININE-BSD FRML MDRD: 43 ML/MIN/1.73SQ M
GLUCOSE SERPL-MCNC: 147 MG/DL (ref 65–140)
HCT VFR BLD AUTO: 38.4 % (ref 34.8–46.1)
HGB BLD-MCNC: 12.6 G/DL (ref 11.5–15.4)
IMM GRANULOCYTES # BLD AUTO: 0.05 THOUSAND/UL (ref 0–0.2)
IMM GRANULOCYTES NFR BLD AUTO: 0 % (ref 0–2)
LYMPHOCYTES # BLD AUTO: 2.08 THOUSANDS/ΜL (ref 0.6–4.47)
LYMPHOCYTES NFR BLD AUTO: 19 % (ref 14–44)
MCH RBC QN AUTO: 30.1 PG (ref 26.8–34.3)
MCHC RBC AUTO-ENTMCNC: 32.8 G/DL (ref 31.4–37.4)
MCV RBC AUTO: 92 FL (ref 82–98)
MONOCYTES # BLD AUTO: 1.02 THOUSAND/ΜL (ref 0.17–1.22)
MONOCYTES NFR BLD AUTO: 9 % (ref 4–12)
NEUTROPHILS # BLD AUTO: 8.01 THOUSANDS/ΜL (ref 1.85–7.62)
NEUTS SEG NFR BLD AUTO: 72 % (ref 43–75)
NRBC BLD AUTO-RTO: 0 /100 WBCS
PLATELET # BLD AUTO: 187 THOUSANDS/UL (ref 149–390)
PMV BLD AUTO: 10.7 FL (ref 8.9–12.7)
POTASSIUM SERPL-SCNC: 3.8 MMOL/L (ref 3.5–5.3)
RBC # BLD AUTO: 4.19 MILLION/UL (ref 3.81–5.12)
SODIUM SERPL-SCNC: 136 MMOL/L (ref 136–145)
T3FREE SERPL-MCNC: 4.56 PG/ML (ref 2.3–4.2)
T4 FREE SERPL-MCNC: 1.91 NG/DL (ref 0.76–1.46)
TSI SER-ACNC: 0.63 IU/L (ref 0–0.55)
WBC # BLD AUTO: 11.23 THOUSAND/UL (ref 4.31–10.16)

## 2021-06-25 PROCEDURE — 83520 IMMUNOASSAY QUANT NOS NONAB: CPT | Performed by: FAMILY MEDICINE

## 2021-06-25 PROCEDURE — 85025 COMPLETE CBC W/AUTO DIFF WBC: CPT | Performed by: FAMILY MEDICINE

## 2021-06-25 PROCEDURE — 99232 SBSQ HOSP IP/OBS MODERATE 35: CPT | Performed by: INTERNAL MEDICINE

## 2021-06-25 PROCEDURE — 84439 ASSAY OF FREE THYROXINE: CPT | Performed by: FAMILY MEDICINE

## 2021-06-25 PROCEDURE — 84481 FREE ASSAY (FT-3): CPT | Performed by: FAMILY MEDICINE

## 2021-06-25 PROCEDURE — 99232 SBSQ HOSP IP/OBS MODERATE 35: CPT | Performed by: FAMILY MEDICINE

## 2021-06-25 PROCEDURE — 80048 BASIC METABOLIC PNL TOTAL CA: CPT | Performed by: FAMILY MEDICINE

## 2021-06-25 PROCEDURE — 84445 ASSAY OF TSI GLOBULIN: CPT | Performed by: FAMILY MEDICINE

## 2021-06-25 RX ORDER — MAGNESIUM SULFATE HEPTAHYDRATE 40 MG/ML
2 INJECTION, SOLUTION INTRAVENOUS ONCE
Status: COMPLETED | OUTPATIENT
Start: 2021-06-25 | End: 2021-06-25

## 2021-06-25 RX ORDER — FUROSEMIDE 10 MG/ML
20 INJECTION INTRAMUSCULAR; INTRAVENOUS ONCE
Status: COMPLETED | OUTPATIENT
Start: 2021-06-25 | End: 2021-06-25

## 2021-06-25 RX ORDER — METOPROLOL TARTRATE 5 MG/5ML
10 INJECTION INTRAVENOUS ONCE
Status: COMPLETED | OUTPATIENT
Start: 2021-06-25 | End: 2021-06-25

## 2021-06-25 RX ORDER — DIGOXIN 0.25 MG/ML
250 INJECTION INTRAMUSCULAR; INTRAVENOUS ONCE
Status: COMPLETED | OUTPATIENT
Start: 2021-06-25 | End: 2021-06-25

## 2021-06-25 RX ORDER — PROPRANOLOL HYDROCHLORIDE 20 MG/1
120 TABLET ORAL EVERY 6 HOURS SCHEDULED
Status: DISCONTINUED | OUTPATIENT
Start: 2021-06-25 | End: 2021-07-01

## 2021-06-25 RX ORDER — POTASSIUM CHLORIDE 20 MEQ/1
40 TABLET, EXTENDED RELEASE ORAL ONCE
Status: COMPLETED | OUTPATIENT
Start: 2021-06-25 | End: 2021-06-25

## 2021-06-25 RX ADMIN — METOROPROLOL TARTRATE 5 MG: 5 INJECTION, SOLUTION INTRAVENOUS at 08:27

## 2021-06-25 RX ADMIN — FUROSEMIDE 20 MG: 10 INJECTION, SOLUTION INTRAMUSCULAR; INTRAVENOUS at 12:10

## 2021-06-25 RX ADMIN — Medication 400 MG: at 08:06

## 2021-06-25 RX ADMIN — DOCUSATE SODIUM 100 MG: 100 CAPSULE, LIQUID FILLED ORAL at 08:06

## 2021-06-25 RX ADMIN — PROPRANOLOL HYDROCHLORIDE 80 MG: 20 TABLET ORAL at 05:46

## 2021-06-25 RX ADMIN — DIGOXIN 250 MCG: 0.25 INJECTION INTRAMUSCULAR; INTRAVENOUS at 23:28

## 2021-06-25 RX ADMIN — METOROPROLOL TARTRATE 5 MG: 5 INJECTION, SOLUTION INTRAVENOUS at 19:19

## 2021-06-25 RX ADMIN — RIVAROXABAN 20 MG: 20 TABLET, FILM COATED ORAL at 08:06

## 2021-06-25 RX ADMIN — METHIMAZOLE 5 MG: 5 TABLET ORAL at 08:07

## 2021-06-25 RX ADMIN — POTASSIUM CHLORIDE 40 MEQ: 1500 TABLET, EXTENDED RELEASE ORAL at 14:24

## 2021-06-25 RX ADMIN — PROPRANOLOL HYDROCHLORIDE 120 MG: 20 TABLET ORAL at 17:52

## 2021-06-25 RX ADMIN — MAGNESIUM SULFATE HEPTAHYDRATE 2 G: 40 INJECTION, SOLUTION INTRAVENOUS at 14:24

## 2021-06-25 RX ADMIN — DOCUSATE SODIUM 100 MG: 100 CAPSULE, LIQUID FILLED ORAL at 17:51

## 2021-06-25 RX ADMIN — Medication 1 PATCH: at 08:07

## 2021-06-25 RX ADMIN — FLUTICASONE PROPIONATE 2 PUFF: 110 AEROSOL, METERED RESPIRATORY (INHALATION) at 08:06

## 2021-06-25 RX ADMIN — PROPRANOLOL HYDROCHLORIDE 120 MG: 20 TABLET ORAL at 11:17

## 2021-06-25 RX ADMIN — FLUTICASONE PROPIONATE 2 PUFF: 110 AEROSOL, METERED RESPIRATORY (INHALATION) at 21:00

## 2021-06-25 RX ADMIN — Medication 400 MG: at 17:51

## 2021-06-25 RX ADMIN — METOROPROLOL TARTRATE 10 MG: 5 INJECTION, SOLUTION INTRAVENOUS at 23:28

## 2021-06-25 NOTE — PROGRESS NOTES
[address]  Progress Note - Daltonmarika 86 1955, 77 y o  female MRN: [id number]  Unit/Bed#: -01 Encounter: [id number]  Primary Care Provider: Kit Philip DO   Date and time admitted to hospital: 6/22/2021 10:15 AM    Hyperthyroidism  Assessment & Plan  Based on clinical symptoms (inside, tremor, weight loss, hot intolerance, palpitation) with elevated free T4, low TSH level  Discussed the case via tiger text with on-call endocrinologist-recommends to start 5 mg methimazole daily and repeat TSH and free T4 in 4-6 weeks  Continue beta-blocker to control heart rate   thyroid ultrasound reviewed  Lipid panel normal  Check thyroid antibodies panel  Will try to reach out to Endocrinology of the antibiotics to see if we can increase methimazole or any other alternative ( radio-iodine ablation? ? )-since patient heart rate is very difficult to control with the setting of hyperthyroidism    * New onset a-fib [facility])  Assessment & Plan  Present on admission  [id number]=7  Patient also has been suffering with hyperthyroidism based on clinical symptoms and lab findings-most likely the cause of Afib  As per EKG patient is having AFib with RVR  Patient received multiple doses of digoxin, Cardizem, IV Lopressor as well as Cardizem drip  Later on metoprolol started and dose increased to 100 mg q 6 hours as per Cardiology  Given with above medications, no significant change in heart rate    After discussion with Cardiology, who recommends to started propranolol  Will also try to reach out to endocrinologist to see if we can increase methimazole dose  Patient already started on Xarelto  To control heart rate will be difficult until thyroid functions suppressed  The potassium>4, magnesium>2       KAELYN (acute kidney injury) ([facility] [address] )  Assessment & Plan  Baseline creatinine is 1 2   creatinine r  Continue to monitor    Tobacco abuse  Assessment & Plan  Patient is cutting it down  Continue to encourage    Increased anion gap metabolic acidosis  Assessment & Plan  Anion gap normalized  Could be secondary to KAELYN  Patient does have AFib, tachypneic elevated WBC-but no known source of infection, most likely secondary to hypothyroidism        VTE Pharmacologic Prophylaxis:   Pharmacologic: Rivaroxaban (Xarelto)  Mechanical VTE Prophylaxis in Place: Yes    Patient Centered Rounds: I have performed bedside rounds with nursing staff today  Discussions with Specialists or Other Care Team Provider:  Cardiologic    Education and Discussions with Family / Patient:  Yes with the patient    Time Spent for Care: 15 minutes  More than 50% of total time spent on counseling and coordination of care as described above  Current Length of Stay: 3 day(s)    Current Patient Status: Inpatient   Certification Statement: The patient will continue to require additional inpatient hospital stay due to AFib, hyperthyroidism    Discharge Plan / Estimated Discharge Date: To be determined      Code Status: Level 1 - Full Code      Subjective:   Seen any bleeding during the RN  Patient is still having uncontrolled heart rate  Denies any nausea, vomiting, dizziness, lightheadedness, chest pain  Objective:     Vitals:   Temp (24hrs), Av 6 °F (37 °C), Min:98 °F (36 7 °C), Max:99 4 °F (37 4 °C)    Temp:  [98 °F (36 7 °C)-99 4 °F (37 4 °C)] 98 1 °F (36 7 °C)  HR:  [133-163] 141  Resp:  [21-27] 21  BP: ()/() 137/100  SpO2:  [93 %-97 %] 96 %  Body mass index is 37 07 kg/m²  Input and Output Summary (last 24 hours): Intake/Output Summary (Last 24 hours) at 2021 1342  Last data filed at 2021 1209  Gross per 24 hour   Intake 680 ml   Output 650 ml   Net 30 ml       Physical Exam:     Physical Exam  Vitals and nursing note reviewed  Exam conducted with a chaperone present  Constitutional:       Appearance: She is not diaphoretic  HENT:      Head: Normocephalic  Right Ear:  There is no impacted cerumen  Nose: Nose normal  No congestion  Mouth/Throat:      Mouth: Mucous membranes are moist       Pharynx: No oropharyngeal exudate  Eyes:      General: No scleral icterus  Conjunctiva/sclera: Conjunctivae normal       Pupils: Pupils are equal, round, and reactive to light  Cardiovascular:      Rate and Rhythm: Tachycardia present  Rhythm irregular  Heart sounds: No gallop  Pulmonary:      Effort: Pulmonary effort is normal  No respiratory distress  Breath sounds: No stridor  No wheezing or rhonchi  Abdominal:      General: Abdomen is flat  Bowel sounds are normal  There is no distension  Palpations: There is no mass  Tenderness: There is no abdominal tenderness  Hernia: No hernia is present  Musculoskeletal:         General: Normal range of motion  Cervical back: Normal range of motion  Right lower leg: No edema  Left lower leg: No edema  Lymphadenopathy:      Cervical: No cervical adenopathy  Skin:     General: Skin is warm  Capillary Refill: Capillary refill takes less than 2 seconds  Findings: No lesion  Neurological:      General: No focal deficit present  Mental Status: She is alert and oriented to person, place, and time        Gait: Gait normal    Psychiatric:         Mood and Affect: Mood normal            Additional Data:     Labs:    Results from last 7 days   Lab Units 06/25/21  0820   WBC Thousand/uL 11 23*   HEMOGLOBIN g/dL 12 6   HEMATOCRIT % 38 4   PLATELETS Thousands/uL 187   NEUTROS PCT % 72   LYMPHS PCT % 19   MONOS PCT % 9   EOS PCT % 0     Results from last 7 days   Lab Units 06/25/21  0925 06/24/21  0433   POTASSIUM mmol/L 3 8 3 9   CHLORIDE mmol/L 105 107   CO2 mmol/L 21 20*   BUN mg/dL 33* 34*   CREATININE mg/dL 1 30 1 32*   CALCIUM mg/dL 8 8 8 9   ALK PHOS U/L  --  67   ALT U/L  --  12   AST U/L  --  10     Results from last 7 days   Lab Units 06/22/21  1043   INR  2 38*       * I Have Reviewed All Lab Data Listed Above  * Additional Pertinent Lab Tests Reviewed: All Labs Within Last 24 Hours Reviewed      Last 24 Hours Medication List:   Current Facility-Administered Medications   Medication Dose Route Frequency Provider Last Rate    albuterol  2 puff Inhalation Q4H PRN Max Bragg MD      ALPRAZolam  0 5 mg Oral TID PRN Max Bragg MD      docusate sodium  100 mg Oral BID Max Bragg MD      fluticasone  2 puff Inhalation Q12H [facility] Aminta Lenz MD      magnesium oxide  400 mg Oral BID Max Bragg MD      magnesium sulfate  2 g Intravenous Once Max Bragg MD      methimazole  5 mg Oral Daily Max Bragg MD      metoprolol  5 mg Intravenous Q6H PRN Max Bragg MD      nicotine  1 patch Transdermal Daily Aminta Lenz MD      ondansetron  4 mg Intravenous Q6H PRN Max Bragg MD      potassium chloride  40 mEq Oral Once Max Bragg MD      propranolol  120 mg Oral Q6H [facility] LINDA Serrato      rivaroxaban  20 mg Oral Daily With Breakfast Max Bragg MD          Today, Patient Was Seen By: Max Bragg MD    ** Please Note: Dragon 360 Dictation voice to text software may have been used in the creation of this document   **

## 2021-06-25 NOTE — NURSING NOTE
(5586) Patient woken to perform assessment  Resting in bed during nursing assessment  Patient denies any shortness of breath at rest, reports some dyspnea on exertion when she ambulates short distances to the commode or chair  Blood pressure obtained for reading of 87/53 and   Patient denies dizziness or lightheadedness while laying in bed  Although HR is elevated, PRN lopressor held due to lower BP  Will continue to monitor and reassess  (3211) Patient ambulated from bed to commode then to chair  Exhibited some tachypnea and SINGER with activity  Patient reports she does feel mildly short of breath  HR maintained in 140's during activity  Blood pressure after activity while sitting in chair 116/67  Patient denies any dizziness/lightheadedness  Inderal administered as ordered  Will continue to monitor

## 2021-06-25 NOTE — PROGRESS NOTES
Progress Note:Cardiology  Cheri Santamaria 1955, 77 y o  female MRN: [id number]    Unit/Bed#: -01 Encounter: [id number]  Attending Physician: Lamin Marques MD   Primary Care Provider: George Marie DO   Date admitted to hospital: 6/22/2021  Length of stay: 3         * New onset a-fib [facility])  Assessment & Plan  Patient presented to her PCP office on 6/21/21 with c/o shortness of breath on exertion since May and found to be in new onset A fib with RVR  PCP ordered labs, started Xarelto, and switched her Atenolol to Metoprolol  She was referred to [facility] - pt seen outpatient by [facility]iology on 6/22/21 and again found to be in A Fib with RVR and shortness of breath  Outpatient labs - TSH <0 01, BNP 9997, Creatinine 2 1, K+ 5 2  She was taken via ambulance to [address] [place] [address]  In ER she received IV Lopressor 5mg x3 doses, Digoxin 250mg IV x 1 with no HR response  She was started on Diltiazem gtt and endocrinology consulted for the suppressed TSH  She was started on Methimazole 5mg and Metoprolol 25mg PO QID  Heart rate remains elevated in 140's despite extra dose of Lopressor 5mg and Dig 250mg IV  Heart rate control complicated by suppressed TSH  Transitioned from Metoprolol to Propranolol 80mg PO Q6 on 6/24/21   Will trial increase to Propranolol 120mg PO Q6  Will continue to adjust medication for heart rate control as needed  Continue Xarelto  Optimize electrolytes with K+ > 4, Mag > 2        Hyperthyroidism  Assessment & Plan  TSH noted to be suppressed at < 0 01 with free T4 2 2  Endocrinology consulted and pt started on Methimazole 5mg  Heart rate control will be difficult to achieve until thyroid is corrected  Will continue to up-titrate beta blocker therapy as needed        Subjective:   Patient seen and examined  No significant events overnight  She denies new complaint today  She states that she feels about the same   She denies any lightheadedness,dizziness, chest pain/pressure, or leg swelling  Heart rates have remained unchanged overnight  She denies any wheezing or worsening cough/shortness of breath  Review of Systems   Constitutional: Negative  HENT: Negative  Cardiovascular: Positive for dyspnea on exertion  Negative for chest pain, irregular heartbeat, leg swelling, near-syncope, orthopnea and palpitations  Respiratory: Positive for shortness of breath  Negative for cough and snoring  Endocrine: Negative  Skin: Negative  Musculoskeletal: Negative  Gastrointestinal: Negative  Genitourinary: Negative  Neurological: Negative  Psychiatric/Behavioral: Negative  Objective:     Vitals: Blood pressure 128/96, pulse (!) 156, temperature 98 °F (36 7 °C), temperature source Oral, resp  rate (!) 27, height 5' 8" (1 727 m), weight 111 kg (243 lb 13 3 oz), SpO2 97 %  , Body mass index is 37 07 kg/m² ,     Orthostatic Blood Pressures      Most Recent Value   Blood Pressure  128/96 filed at 06/25/2021 0827   Patient Position - Orthostatic VS  Sitting filed at 06/25/2021 0700          Physical Exam  Vitals and nursing note reviewed  Constitutional:       General: She is not in acute distress  Appearance: She is well-developed  HENT:      Head: Normocephalic and atraumatic  Eyes:      Conjunctiva/sclera: Conjunctivae normal    Cardiovascular:      Rate and Rhythm: Tachycardia present  Rhythm irregular  Heart sounds: No murmur heard  Comments: No lower leg edema  Pulmonary:      Effort: Pulmonary effort is normal  No respiratory distress  Breath sounds: Normal breath sounds  Abdominal:      Palpations: Abdomen is soft  Tenderness: There is no abdominal tenderness  Musculoskeletal:      Cervical back: Neck supple  Skin:     General: Skin is warm and dry  Neurological:      Mental Status: She is alert and oriented to person, place, and time     Psychiatric:         Mood and Affect: Mood normal          Speech: Speech normal          Behavior: Behavior normal          Cognition and Memory: Cognition normal             Intake/Output Summary (Last 24 hours) at 6/25/2021 1001  Last data filed at 6/25/2021 0700  Gross per 24 hour   Intake --   Output 825 ml   Net -825 ml       Weight (last 2 days)     Date/Time   Weight    06/25/21 0536   111 (243 83)    06/24/21 0541   110 (243 61)    06/23/21 0330   110 (243 39)                 Medications:      Current Facility-Administered Medications:     albuterol (PROVENTIL HFA,VENTOLIN HFA) inhaler 2 puff, 2 puff, Inhalation, Q4H PRN, Ricardo Lenz MD    ALPRAZolam Cydne Bard) tablet 0 5 mg, 0 5 mg, Oral, TID PRN, Alvy Apley, MD    diltiazem (CARDIZEM) 125 mg in sodium chloride 0 9 % 125 mL infusion, 1-15 mg/hr, Intravenous, Titrated, Alvy Apley, MD, Held at 06/23/21 1000    docusate sodium (COLACE) capsule 100 mg, 100 mg, Oral, BID, [facility] STEVENSON Lenz MD, 100 mg at 06/25/21 0806    fluticasone (FLOVENT HFA) 110 MCG/ACT inhaler 2 puff, 2 puff, Inhalation, Q12H [address], Ricardo Lenz MD, 2 puff at 06/25/21 0806    magnesium oxide (MAG-OX) tablet 400 mg, 400 mg, Oral, BID, Ricardo Lenz MD, 400 mg at 06/25/21 0806    methimazole (TAPAZOLE) tablet 5 mg, 5 mg, Oral, Daily, Ricardo Lenz MD, 5 mg at 06/25/21 0807    metoprolol (LOPRESSOR) injection 5 mg, 5 mg, Intravenous, Q6H PRN, Alvy Apley, MD, 5 mg at 06/25/21 0827    nicotine (NICODERM CQ) 14 mg/24hr TD 24 hr patch 1 patch, 1 patch, Transdermal, Daily, Alvy Apley, MD, 1 patch at 06/25/21 0807    ondansetron (ZOFRAN) injection 4 mg, 4 mg, Intravenous, Q6H PRN, Alvy Apley, MD    propranolol (INDERAL) tablet 120 mg, 120 mg, Oral, Q6H [address], LINDA Everett    rivaroxaban (XARELTO) tablet 20 mg, 20 mg, Oral, Daily With Breakfast, Alvy Apley, MD, 20 mg at 06/25/21 4527     Labs & Results:    Results from last 7 days   Lab Units 06/22/21  1043   TROPONIN I ng/mL <0 02     Results from last 7 days   Lab Units 06/25/21  0820 06/24/21  0433 06/23/21  0559   WBC Thousand/uL 11 23* 13 10* 14 26*   HEMOGLOBIN g/dL 12 6 11 7 12 2   HEMATOCRIT % 38 4 35 4 36 6   PLATELETS Thousands/uL 187 167 163         Results from last 7 days   Lab Units 06/25/21  0925 06/24/21  0433 06/23/21  0610 06/22/21  1722 06/22/21  1043   POTASSIUM mmol/L 3 8 3 9 3 9 4 0 4 3   CHLORIDE mmol/L 105 107 107 107 107   CO2 mmol/L 21 20* 19* 17* 19*   BUN mg/dL 33* 34* 43* 48* 54*   CREATININE mg/dL 1 30 1 32* 1 31* 1 52* 1 80*   CALCIUM mg/dL 8 8 8 9 8 6 9 0 9 5   ALK PHOS U/L  --  67  --  70 72   ALT U/L  --  12  --  13 15   AST U/L  --  10  --  9 11     Results from last 7 days   Lab Units 06/22/21  1043   INR  2 38*   PTT seconds 34     Results from last 7 days   Lab Units 06/24/21  0433 06/23/21  0610 06/22/21  1043   MAGNESIUM mg/dL 1 8 1 7 1 9     Results from last 7 days   Lab Units 06/22/21  1043   NT-PRO BNP pg/mL 11,876*        Telemetry: atrial fibrillation with RVR, rates 130's-160's    Counseling / Coordination of Care  Total floor / unit time spent today 20 minutes  Greater than 50% of total time was spent with the patient and / or family counseling and / or coordination of care    A description of the counseling / coordination of care: Discussed case with Dr Tamie Heath

## 2021-06-25 NOTE — ASSESSMENT & PLAN NOTE
Present on admission  [id number]  Patient also has been suffering with hyperthyroidism based on clinical symptoms and lab findings-most likely the cause of Afib  As per EKG patient is having AFib with RVR  Patient received multiple doses of digoxin, Cardizem, IV Lopressor as well as Cardizem drip  Later on metoprolol started and dose increased to 100 mg q 6 hours as per Cardiology  Given with above medications, no significant change in heart rate    After discussion with Cardiology, who recommends to started propranolol  Will also try to reach out to endocrinologist to see if we can increase methimazole dose  Patient already started on Xarelto  To control heart rate will be difficult until thyroid functions suppressed  The potassium>4, magnesium>2

## 2021-06-25 NOTE — CASE MANAGEMENT
Chart reviewed aware of medical management  Case was discussed in multidisciplinary discharge meeting  Clinical information supporting hospitalization:   hyperthyroidism induced afib  - continue with rate control  Barriers to discharge:  - medical management  Discharge plan: home  - noting care is in the parking lot  - Will need PCP appointment    MD indicated patient will be here thru the weekend    CM will continue to follow plan of care

## 2021-06-25 NOTE — PLAN OF CARE
Problem: Potential for Falls  Goal: Patient will remain free of falls  Description: INTERVENTIONS:  - Educate patient/family on patient safety including physical limitations  - Instruct patient to call for assistance with activity   - Consult OT/PT to assist with strengthening/mobility   - Keep Call bell within reach  - Keep bed low and locked with side rails adjusted as appropriate  - Keep care items and personal belongings within reach  - Initiate and maintain comfort rounds  - Make Fall Risk Sign visible to staff  - Offer Toileting every   Hours, in advance of need  - Initiate/Maintain   alarm  - Obtain necessary fall risk management equipment:   - Apply yellow socks and bracelet for high fall risk patients  - Consider moving patient to room near nurses station  Outcome: Progressing     Problem: MOBILITY - ADULT  Goal: Maintain or return to baseline ADL function  Description: INTERVENTIONS:  -  Assess patient's ability to carry out ADLs; assess patient's baseline for ADL function and identify physical deficits which impact ability to perform ADLs (bathing, care of mouth/teeth, toileting, grooming, dressing, etc )  - Assess/evaluate cause of self-care deficits   - Assess range of motion  - Assess patient's mobility; develop plan if impaired  - Assess patient's need for assistive devices and provide as appropriate  - Encourage maximum independence but intervene and supervise when necessary  - Involve family in performance of ADLs  - Assess for home care needs following discharge   - Consider OT consult to assist with ADL evaluation and planning for discharge  - Provide patient education as appropriate  Outcome: Progressing  Goal: Maintains/Returns to pre admission functional level  Description: INTERVENTIONS:  - Perform BMAT or MOVE assessment daily    - Set and communicate daily mobility goal to care team and patient/family/caregiver     - Collaborate with rehabilitation services on mobility goals if consulted  - Perform Range of Motion   times a day  - Reposition patient every   hours  - Dangle patient   times a day  - Stand patient   times a day  - Ambulate patient   times a day  - Out of bed to chair   times a day   - Out of bed for meals     times a day  - Out of bed for toileting  - Record patient progress and toleration of activity level   Outcome: Progressing     Problem: CARDIOVASCULAR - ADULT  Goal: Maintains optimal cardiac output and hemodynamic stability  Description: INTERVENTIONS:  - Monitor I/O, vital signs and rhythm  - Monitor for S/S and trends of decreased cardiac output  - Administer and titrate ordered vasoactive medications to optimize hemodynamic stability  - Assess quality of pulses, skin color and temperature  - Assess for signs of decreased coronary artery perfusion  - Instruct patient to report change in severity of symptoms  Outcome: Progressing  Goal: Absence of cardiac dysrhythmias or at baseline rhythm  Description: INTERVENTIONS:  - Continuous cardiac monitoring, vital signs, obtain 12 lead EKG if ordered  - Administer antiarrhythmic and heart rate control medications as ordered  - Monitor electrolytes and administer replacement therapy as ordered  Outcome: Progressing     Problem: INFECTION - ADULT  Goal: Absence or prevention of progression during hospitalization  Description: INTERVENTIONS:  - Assess and monitor for signs and symptoms of infection  - Monitor lab/diagnostic results  - Monitor all insertion sites, i e  indwelling lines, tubes, and drains  - Monitor endotracheal if appropriate and nasal secretions for changes in amount and color  - [place] appropriate cooling/warming therapies per order  - Administer medications as ordered  - Instruct and encourage patient and family to use good hand hygiene technique  - Identify and instruct in appropriate isolation precautions for identified infection/condition  Outcome: Progressing     Problem: DISCHARGE PLANNING  Goal: Discharge to home or other facility with appropriate resources  Description: INTERVENTIONS:  - Identify barriers to discharge w/patient and caregiver  - Arrange for needed discharge resources and transportation as appropriate  - Identify discharge learning needs (meds, wound care, etc )  - Arrange for interpretive services to assist at discharge as needed  - Refer to Case Management Department for coordinating discharge planning if the patient needs post-hospital services based on physician/advanced practitioner order or complex needs related to functional status, cognitive ability, or social support system  Outcome: Progressing     Problem: Knowledge Deficit  Goal: Patient/family/caregiver demonstrates understanding of disease process, treatment plan, medications, and discharge instructions  Description: Complete learning assessment and assess knowledge base    Interventions:  - Provide teaching at level of understanding  - Provide teaching via preferred learning methods  Outcome: Progressing

## 2021-06-25 NOTE — UTILIZATION REVIEW
Continued Stay Review    Date:  6/25/2021                         Current Patient Class: inpatient  Current Level of Care: level 2Stepdown/Hot     HPI:65 y o  female initially admitted on 6/22/2021 inpatient due to new onset atrial fibrillation/KAELYN/Hyperthyroidism  Baseline creatinine 1 2  Patient was given hydration, started on methimazole  Patient received multiple doses of digoxin, Cardizem, IV Lopressor as well as Cardizem drip    Assessment/Plan:  6/25/2021: Heart rate remains uncontrolled  Heart rate remains elevated in 140's despite extra dose of Lopressor 5mg and Dig 250mg IV  On exam tachycardic  Rhythm irregular  Wbc 11 23   Bun 33, creatinine 1 30  To continue beta blocker, check thyroid antibodies  Increase Propranolol  Continue Xarelto  Vital Signs:   06/25/21 0827  --  156Abnormal   27Abnormal   128/96  109  97 %  --  --   06/25/21 0700  98 °F (36 7 °C)  163Abnormal   26Abnormal   116/83  96  97 %  None (Room air)  Sitting   06/25/21 0543  --  154Abnormal   26Abnormal   116/67  --  96 %  None (Room air)  Sitting   06/25/21 0347  --  150Abnormal   --  87/53Abnormal   --  94 %  None (Room air)  Lying   06/24/21 2330  98 5 °F (36 9 °C)  152Abnormal   24Abnormal   124/55  78  93 %  None (Room air)  Lying   06/24/21 2052  --  138Abnormal   --  121/84  --  --  --  --   06/24/21 1915  99 1 °F (37 3 °C)  149Abnormal   27Abnormal   120/58  81  94 %  None (Room air)         Pertinent Labs/Diagnostic Results:   6/22/2021 CxR - No acute cardiopulmonary disease    6/22/2021 US thyroid The thyroid is prominent  6/22/2021 ECG - atrial fibrillation   Normal ST and T   Results from last 7 days   Lab Units 06/25/21  0820 06/24/21  0433 06/23/21  0559 06/22/21  1043   WBC Thousand/uL 11 23* 13 10* 14 26* 14 38*   HEMOGLOBIN g/dL 12 6 11 7 12 2 13 2   HEMATOCRIT % 38 4 35 4 36 6 40 2   PLATELETS Thousands/uL 187 167 163 194   NEUTROS ABS Thousands/µL 8 01* 9 44* 10 91* 11 55*     Results from last 7 days   Lab Units 06/25/21  0925 06/24/21  0433 06/23/21  0610 06/22/21  1722 06/22/21  1043   SODIUM mmol/L 136 137 136 139 142   POTASSIUM mmol/L 3 8 3 9 3 9 4 0 4 3   CHLORIDE mmol/L 105 107 107 107 107   CO2 mmol/L 21 20* 19* 17* 19*   ANION GAP mmol/L 10 10 10 15* 16*   BUN mg/dL 33* 34* 43* 48* 54*   CREATININE mg/dL 1 30 1 32* 1 31* 1 52* 1 80*   EGFR ml/min/1 73sq m 43 42 42 35 29   CALCIUM mg/dL 8 8 8 9 8 6 9 0 9 5   MAGNESIUM mg/dL  --  1 8 1 7  --  1 9     Results from last 7 days   Lab Units 06/24/21  0433 06/22/21  1722 06/22/21  1043   AST U/L 10 9 11   ALT U/L 12 13 15   ALK PHOS U/L 67 70 72   TOTAL PROTEIN g/dL 6 3* 6 6 7 1   ALBUMIN g/dL 2 5* 2 8* 3 1*   TOTAL BILIRUBIN mg/dL 0 83 0 79 0 85     Results from last 7 days   Lab Units 06/25/21  0925 06/24/21  0433 06/23/21  0610 06/22/21  1722 06/22/21  1043   GLUCOSE RANDOM mg/dL 147* 113 117 136 124     Results from last 7 days   Lab Units 06/22/21  1043   TROPONIN I ng/mL <0 02     Results from last 7 days   Lab Units 06/22/21  1043   PROTIME seconds 25 4*   INR  2 38*   PTT seconds 34     Results from last 7 days   Lab Units 06/22/21  1128   LACTIC ACID mmol/L 1 8     Results from last 7 days   Lab Units 06/22/21  1043   NT-PRO BNP pg/mL 11,876*       Medications:   Scheduled Medications:  docusate sodium, 100 mg, Oral, BID  fluticasone, 2 puff, Inhalation, Q12H SABINE  magnesium oxide, 400 mg, Oral, BID  methimazole, 5 mg, Oral, Daily  nicotine, 1 patch, Transdermal, Daily  propranolol, 120 mg, Oral, Q6H SABINE  rivaroxaban, 20 mg, Oral, Daily With Breakfast      Continuous IV Infusions:  diltiazem, 1-15 mg/hr, Intravenous, Titrated      PRN Meds:  albuterol, 2 puff, Inhalation, Q4H PRN  ALPRAZolam, 0 5 mg, Oral, TID PRN  metoprolol, 5 mg, Intravenous, Q6H PRN - used x 1 6/25/2021   ondansetron, 4 mg, Intravenous, Q6H PRN        Discharge Plan: To be determined       Network Utilization Review Department  ATTENTION: Please call with any questions or concerns to [phone] and carefully listen to the prompts so that you are directed to the right person  All voicemails are confidential   Carmenza Winkler all requests for admission clinical reviews, approved or denied determinations and any other requests to dedicated fax number below belonging to the campus where the patient is receiving treatment   List of dedicated fax numbers for the Facilities:  [address] DENIALS (Administrative/Medical Necessity) [phone]   [address] (Maternity/NICU/Pediatrics) [phone]   [address] [place] [address] [id number] [place] [address] [address] [facility] [place] [address] [phone]

## 2021-06-26 ENCOUNTER — APPOINTMENT (INPATIENT)
Dept: RADIOLOGY | Facility: HOSPITAL | Age: 66
DRG: 644 | End: 2021-06-26
Payer: COMMERCIAL

## 2021-06-26 LAB
ANION GAP SERPL CALCULATED.3IONS-SCNC: 9 MMOL/L (ref 4–13)
BUN SERPL-MCNC: 30 MG/DL (ref 5–25)
CALCIUM SERPL-MCNC: 8.8 MG/DL (ref 8.3–10.1)
CHLORIDE SERPL-SCNC: 106 MMOL/L (ref 100–108)
CO2 SERPL-SCNC: 21 MMOL/L (ref 21–32)
CREAT SERPL-MCNC: 1.16 MG/DL (ref 0.6–1.3)
DIGOXIN SERPL-MCNC: 1.1 NG/ML (ref 0.8–2)
GFR SERPL CREATININE-BSD FRML MDRD: 49 ML/MIN/1.73SQ M
GLUCOSE SERPL-MCNC: 105 MG/DL (ref 65–140)
MAGNESIUM SERPL-MCNC: 2.3 MG/DL (ref 1.6–2.6)
NT-PROBNP SERPL-MCNC: 7072 PG/ML
POTASSIUM SERPL-SCNC: 4.7 MMOL/L (ref 3.5–5.3)
SODIUM SERPL-SCNC: 136 MMOL/L (ref 136–145)

## 2021-06-26 PROCEDURE — 83735 ASSAY OF MAGNESIUM: CPT | Performed by: FAMILY MEDICINE

## 2021-06-26 PROCEDURE — 83880 ASSAY OF NATRIURETIC PEPTIDE: CPT | Performed by: NURSE PRACTITIONER

## 2021-06-26 PROCEDURE — 99232 SBSQ HOSP IP/OBS MODERATE 35: CPT | Performed by: FAMILY MEDICINE

## 2021-06-26 PROCEDURE — 80162 ASSAY OF DIGOXIN TOTAL: CPT | Performed by: NURSE PRACTITIONER

## 2021-06-26 PROCEDURE — 71046 X-RAY EXAM CHEST 2 VIEWS: CPT

## 2021-06-26 PROCEDURE — 80048 BASIC METABOLIC PNL TOTAL CA: CPT | Performed by: FAMILY MEDICINE

## 2021-06-26 RX ADMIN — Medication 400 MG: at 08:03

## 2021-06-26 RX ADMIN — PROPRANOLOL HYDROCHLORIDE 120 MG: 20 TABLET ORAL at 11:35

## 2021-06-26 RX ADMIN — METOROPROLOL TARTRATE 5 MG: 5 INJECTION, SOLUTION INTRAVENOUS at 07:17

## 2021-06-26 RX ADMIN — PROPRANOLOL HYDROCHLORIDE 120 MG: 20 TABLET ORAL at 05:18

## 2021-06-26 RX ADMIN — FLUTICASONE PROPIONATE 2 PUFF: 110 AEROSOL, METERED RESPIRATORY (INHALATION) at 21:19

## 2021-06-26 RX ADMIN — FLUTICASONE PROPIONATE 2 PUFF: 110 AEROSOL, METERED RESPIRATORY (INHALATION) at 08:03

## 2021-06-26 RX ADMIN — RIVAROXABAN 20 MG: 20 TABLET, FILM COATED ORAL at 08:03

## 2021-06-26 RX ADMIN — DOCUSATE SODIUM 100 MG: 100 CAPSULE, LIQUID FILLED ORAL at 08:03

## 2021-06-26 RX ADMIN — METHIMAZOLE 5 MG: 5 TABLET ORAL at 08:03

## 2021-06-26 RX ADMIN — Medication 400 MG: at 17:03

## 2021-06-26 RX ADMIN — PROPRANOLOL HYDROCHLORIDE 120 MG: 20 TABLET ORAL at 00:28

## 2021-06-26 RX ADMIN — PROPRANOLOL HYDROCHLORIDE 120 MG: 20 TABLET ORAL at 17:03

## 2021-06-26 RX ADMIN — DOCUSATE SODIUM 100 MG: 100 CAPSULE, LIQUID FILLED ORAL at 17:03

## 2021-06-26 RX ADMIN — Medication 1 PATCH: at 08:02

## 2021-06-26 NOTE — ASSESSMENT & PLAN NOTE
Based on clinical symptoms (inside, tremor, weight loss, hot intolerance, palpitation) with elevated free T4, low TSH level  Discussed the case via tiger text with on-call endocrinologist-recommends to start 5 mg methimazole daily and repeat TSH and free T4 in 4-6 weeks  Continue beta-blocker to control heart rate   thyroid ultrasound reviewed  Lipid panel normal  Check thyroid antibodies panel  FT4: 1 91, FT3: 4 59, TSI:0 63

## 2021-06-26 NOTE — ASSESSMENT & PLAN NOTE
Anion gap normalized  Could be secondary to KAELYN  Patient does have AFib, tachypneic elevated WBC-but no known source of infection, most likely secondary to hypothyroidism  Resolved

## 2021-06-26 NOTE — ASSESSMENT & PLAN NOTE
Present on admission  [id number]=2  Patient also has been suffering with hyperthyroidism based on clinical symptoms and lab findings-most likely the cause of Afib  As per EKG patient is having AFib with RVR  Patient received multiple doses of digoxin, Cardizem, IV Lopressor as well as Cardizem drip  Later on metoprolol started and dose increased to 100 mg q 6 hours as per Cardiology  Given with above medications, no significant change in heart rate    After discussion with Cardiology, who recommends to started propranolol  Patient already started on Xarelto  To control heart rate will be difficult until thyroid functions suppressed  The potassium>4, magnesium>2

## 2021-06-26 NOTE — PROGRESS NOTES
[address]  Progress Note - [address] 1955, 77 y o  female MRN: [id number]  Unit/Bed#: -01 Encounter: [id number]  Primary Care Provider: Daljit Benitez DO   Date and time admitted to hospital: 6/22/2021 10:15 AM    Hyperthyroidism  Assessment & Plan  Based on clinical symptoms (inside, tremor, weight loss, hot intolerance, palpitation) with elevated free T4, low TSH level  Discussed the case via tiger text with on-call endocrinologist-recommends to start 5 mg methimazole daily and repeat TSH and free T4 in 4-6 weeks  Continue beta-blocker to control heart rate   thyroid ultrasound reviewed  Lipid panel normal  Check thyroid antibodies panel  FT4: 1 91, FT3: 4 59, TSI:0 63    * New onset a-fib ([facility] [address] )  Assessment & Plan  Present on admission  [id number]=8  Patient also has been suffering with hyperthyroidism based on clinical symptoms and lab findings-most likely the cause of Afib  As per EKG patient is having AFib with RVR  Patient received multiple doses of digoxin, Cardizem, IV Lopressor as well as Cardizem drip  Later on metoprolol started and dose increased to 100 mg q 6 hours as per Cardiology  Given with above medications, no significant change in heart rate    After discussion with Cardiology, who recommends to started propranolol  Patient already started on Xarelto  To control heart rate will be difficult until thyroid functions suppressed  The potassium>4, magnesium>2       KAELYN (acute kidney injury) ([facility] [address] )  Assessment & Plan  Baseline creatinine is 1 2  Improved    Tobacco abuse  Assessment & Plan  Patient is cutting it down  Continue to encourage    Increased anion gap metabolic acidosis  Assessment & Plan  Anion gap normalized  Could be secondary to KAELYN  Patient does have AFib, tachypneic elevated WBC-but no known source of infection, most likely secondary to hypothyroidism  Resolved          VTE Pharmacologic Prophylaxis:   Pharmacologic: Rivaroxaban (Xarelto)  Mechanical VTE Prophylaxis in Place: Yes    Patient Centered Rounds: I have performed bedside rounds with nursing staff today  Education and Discussions with Family / Patient:  Yes with patient    Time Spent for Care: 15 minutes  More than 50% of total time spent on counseling and coordination of care as described above  Current Length of Stay: 4 day(s)    Current Patient Status: Inpatient   Certification Statement: The patient will continue to require additional inpatient hospital stay due to AFib, hyperthyroid    Discharge Plan / Estimated Discharge Date: To be determined      Code Status: Level 1 - Full Code      Subjective:   Seen and evaluated during the round no overnight issues  Patient heart rate is still uncontrolled  Objective:     Vitals:   Temp (24hrs), Av 2 °F (36 8 °C), Min:97 8 °F (36 6 °C), Max:98 7 °F (37 1 °C)    Temp:  [97 8 °F (36 6 °C)-98 7 °F (37 1 °C)] 98 6 °F (37 °C)  HR:  [134-155] 141  Resp:  [19-43] 20  BP: (107-143)/(56-90) 136/70  SpO2:  [87 %-97 %] 96 %  Body mass index is 37 07 kg/m²  Input and Output Summary (last 24 hours): Intake/Output Summary (Last 24 hours) at 2021 1317  Last data filed at 2021 1143  Gross per 24 hour   Intake 980 ml   Output 1375 ml   Net -395 ml       Physical Exam:     Physical Exam  Vitals and nursing note reviewed  Exam conducted with a chaperone present  Constitutional:       Appearance: Normal appearance  HENT:      Head: Normocephalic  Nose: Nose normal       Mouth/Throat:      Mouth: Mucous membranes are moist       Pharynx: No oropharyngeal exudate  Eyes:      General: No scleral icterus  Conjunctiva/sclera: Conjunctivae normal       Pupils: Pupils are equal, round, and reactive to light  Cardiovascular:      Rate and Rhythm: Tachycardia present  Rhythm irregular  Heart sounds: No friction rub  No gallop      Pulmonary:      Effort: Pulmonary effort is normal  No respiratory distress  Breath sounds: No stridor  No wheezing or rhonchi  Abdominal:      General: Abdomen is flat  Bowel sounds are normal  There is no distension  Palpations: There is no mass  Tenderness: There is no abdominal tenderness  Hernia: No hernia is present  Musculoskeletal:         General: Normal range of motion  Cervical back: Normal range of motion  Right lower leg: No edema  Left lower leg: No edema  Lymphadenopathy:      Cervical: No cervical adenopathy  Skin:     General: Skin is warm  Capillary Refill: Capillary refill takes less than 2 seconds  Findings: No lesion  Neurological:      General: No focal deficit present  Mental Status: She is alert and oriented to person, place, and time  Psychiatric:         Mood and Affect: Mood normal        Additional Data:     Labs:    Results from last 7 days   Lab Units 06/25/21  0820   WBC Thousand/uL 11 23*   HEMOGLOBIN g/dL 12 6   HEMATOCRIT % 38 4   PLATELETS Thousands/uL 187   NEUTROS PCT % 72   LYMPHS PCT % 19   MONOS PCT % 9   EOS PCT % 0     Results from last 7 days   Lab Units 06/26/21  0518 06/24/21  0433   POTASSIUM mmol/L 4 7 3 9   CHLORIDE mmol/L 106 107   CO2 mmol/L 21 20*   BUN mg/dL 30* 34*   CREATININE mg/dL 1 16 1 32*   CALCIUM mg/dL 8 8 8 9   ALK PHOS U/L  --  67   ALT U/L  --  12   AST U/L  --  10     Results from last 7 days   Lab Units 06/22/21  1043   INR  2 38*       * I Have Reviewed All Lab Data Listed Above  * Additional Pertinent Lab Tests Reviewed:  All Labs Within Last 24 Hours Reviewed      Last 24 Hours Medication List:   Current Facility-Administered Medications   Medication Dose Route Frequency Provider Last Rate    albuterol  2 puff Inhalation Q4H PRN Daniella Mai MD      ALPRAZolam  0 5 mg Oral TID PRN Daniella Mai MD      docusate sodium  100 mg Oral BID Daniella Mai MD      fluticasone  2 puff Inhalation Q12H Babak Lewis MD      magnesium oxide  400 mg Oral BID Robin Osullivan MD      methimazole  5 mg Oral Daily Robin Osullivan MD      metoprolol  5 mg Intravenous Q6H PRN Robin Osullivan MD      nicotine  1 patch Transdermal Daily Aminta Lenz MD      ondansetron  4 mg Intravenous Q6H PRN Robin Osullivan MD      propranolol  120 mg Oral Q6H [address] Glorine Delay, CRNP      rivaroxaban  20 mg Oral Daily With Breakfast Robin Osullivan MD          Today, Patient Was Seen By: Robin Osullivan MD    ** Please Note: Dragon 360 Dictation voice to text software may have been used in the creation of this document   **

## 2021-06-27 LAB
ALBUMIN SERPL BCP-MCNC: 2.6 G/DL (ref 3.5–5)
ALP SERPL-CCNC: 67 U/L (ref 46–116)
ALT SERPL W P-5'-P-CCNC: 10 U/L (ref 12–78)
ANION GAP SERPL CALCULATED.3IONS-SCNC: 10 MMOL/L (ref 4–13)
AST SERPL W P-5'-P-CCNC: 14 U/L (ref 5–45)
BASOPHILS # BLD AUTO: 0.04 THOUSANDS/ΜL (ref 0–0.1)
BASOPHILS NFR BLD AUTO: 0 % (ref 0–1)
BILIRUB SERPL-MCNC: 0.53 MG/DL (ref 0.2–1)
BUN SERPL-MCNC: 27 MG/DL (ref 5–25)
CALCIUM ALBUM COR SERPL-MCNC: 10.3 MG/DL (ref 8.3–10.1)
CALCIUM SERPL-MCNC: 9.2 MG/DL (ref 8.3–10.1)
CHLORIDE SERPL-SCNC: 106 MMOL/L (ref 100–108)
CO2 SERPL-SCNC: 20 MMOL/L (ref 21–32)
CREAT SERPL-MCNC: 1.1 MG/DL (ref 0.6–1.3)
EOSINOPHIL # BLD AUTO: 0.15 THOUSAND/ΜL (ref 0–0.61)
EOSINOPHIL NFR BLD AUTO: 2 % (ref 0–6)
ERYTHROCYTE [DISTWIDTH] IN BLOOD BY AUTOMATED COUNT: 13.2 % (ref 11.6–15.1)
GFR SERPL CREATININE-BSD FRML MDRD: 52 ML/MIN/1.73SQ M
GLUCOSE SERPL-MCNC: 114 MG/DL (ref 65–140)
HCT VFR BLD AUTO: 39.2 % (ref 34.8–46.1)
HGB BLD-MCNC: 12.7 G/DL (ref 11.5–15.4)
IMM GRANULOCYTES # BLD AUTO: 0.02 THOUSAND/UL (ref 0–0.2)
IMM GRANULOCYTES NFR BLD AUTO: 0 % (ref 0–2)
LYMPHOCYTES # BLD AUTO: 2.87 THOUSANDS/ΜL (ref 0.6–4.47)
LYMPHOCYTES NFR BLD AUTO: 30 % (ref 14–44)
MCH RBC QN AUTO: 30 PG (ref 26.8–34.3)
MCHC RBC AUTO-ENTMCNC: 32.4 G/DL (ref 31.4–37.4)
MCV RBC AUTO: 93 FL (ref 82–98)
MONOCYTES # BLD AUTO: 0.86 THOUSAND/ΜL (ref 0.17–1.22)
MONOCYTES NFR BLD AUTO: 9 % (ref 4–12)
NEUTROPHILS # BLD AUTO: 5.76 THOUSANDS/ΜL (ref 1.85–7.62)
NEUTS SEG NFR BLD AUTO: 59 % (ref 43–75)
NRBC BLD AUTO-RTO: 0 /100 WBCS
PLATELET # BLD AUTO: 192 THOUSANDS/UL (ref 149–390)
PMV BLD AUTO: 10.7 FL (ref 8.9–12.7)
POTASSIUM SERPL-SCNC: 4.8 MMOL/L (ref 3.5–5.3)
PROT SERPL-MCNC: 6.8 G/DL (ref 6.4–8.2)
QRS AXIS: -55 DEGREES
QRSD INTERVAL: 104 MS
QT INTERVAL: 298 MS
QTC INTERVAL: 477 MS
RBC # BLD AUTO: 4.24 MILLION/UL (ref 3.81–5.12)
SODIUM SERPL-SCNC: 136 MMOL/L (ref 136–145)
T WAVE AXIS: 112 DEGREES
VENTRICULAR RATE: 154 BPM
WBC # BLD AUTO: 9.7 THOUSAND/UL (ref 4.31–10.16)

## 2021-06-27 PROCEDURE — 80053 COMPREHEN METABOLIC PANEL: CPT | Performed by: FAMILY MEDICINE

## 2021-06-27 PROCEDURE — 93010 ELECTROCARDIOGRAM REPORT: CPT | Performed by: INTERNAL MEDICINE

## 2021-06-27 PROCEDURE — 99232 SBSQ HOSP IP/OBS MODERATE 35: CPT | Performed by: FAMILY MEDICINE

## 2021-06-27 PROCEDURE — 85025 COMPLETE CBC W/AUTO DIFF WBC: CPT | Performed by: FAMILY MEDICINE

## 2021-06-27 RX ADMIN — METHIMAZOLE 5 MG: 5 TABLET ORAL at 08:21

## 2021-06-27 RX ADMIN — Medication 400 MG: at 17:00

## 2021-06-27 RX ADMIN — PROPRANOLOL HYDROCHLORIDE 120 MG: 20 TABLET ORAL at 05:51

## 2021-06-27 RX ADMIN — Medication 1 PATCH: at 08:24

## 2021-06-27 RX ADMIN — RIVAROXABAN 20 MG: 20 TABLET, FILM COATED ORAL at 08:21

## 2021-06-27 RX ADMIN — FLUTICASONE PROPIONATE 2 PUFF: 110 AEROSOL, METERED RESPIRATORY (INHALATION) at 21:40

## 2021-06-27 RX ADMIN — PROPRANOLOL HYDROCHLORIDE 120 MG: 20 TABLET ORAL at 12:21

## 2021-06-27 RX ADMIN — Medication 400 MG: at 08:21

## 2021-06-27 RX ADMIN — PROPRANOLOL HYDROCHLORIDE 120 MG: 20 TABLET ORAL at 00:12

## 2021-06-27 RX ADMIN — FLUTICASONE PROPIONATE 2 PUFF: 110 AEROSOL, METERED RESPIRATORY (INHALATION) at 08:21

## 2021-06-27 RX ADMIN — PROPRANOLOL HYDROCHLORIDE 120 MG: 20 TABLET ORAL at 23:37

## 2021-06-27 RX ADMIN — PROPRANOLOL HYDROCHLORIDE 120 MG: 20 TABLET ORAL at 17:00

## 2021-06-27 RX ADMIN — DOCUSATE SODIUM 100 MG: 100 CAPSULE, LIQUID FILLED ORAL at 08:21

## 2021-06-27 NOTE — ASSESSMENT & PLAN NOTE
Present on admission-patient's heart rate still remained uncontrolled  [id number]=5  Patient also has been suffering with hyperthyroidism based on clinical symptoms and lab findings-most likely the cause of Afib  As per EKG patient is having AFib with RVR  Patient received multiple doses of digoxin, Cardizem, IV Lopressor as well as Cardizem drip  Later on metoprolol started and dose increased to 100 mg q 6 hours as per Cardiology  Given with above medications, no significant change in heart rate    After discussion with Cardiology, who recommends to started propranolol  Patient already started on Xarelto  To control heart rate will be difficult until thyroid functions suppressed  Reach out to endocrinologist for consideration of increase dose of methimazole-awaiting for response  The potassium>4, magnesium>2

## 2021-06-27 NOTE — PROGRESS NOTES
[address]  Progress Note - M[address]marika 86 1955, 77 y o  female MRN: [id number]  Unit/Bed#: -01 Encounter: [id number]  Primary Care Provider: Katelyn Flowers DO   Date and time admitted to hospital: 6/22/2021 10:15 AM    Hyperthyroidism  Assessment & Plan  Based on clinical symptoms (inside, tremor, weight loss, hot intolerance, palpitation) with elevated free T4, low TSH level  Discussed the case via tiger text with on-call endocrinologist-recommends to start 5 mg methimazole daily and repeat TSH and free T4 in 4-6 weeks  Reached out to endocrinologist again to get recommendation regarding methimazole dose adjustment-awaiting for response  Continue beta-blocker to control heart rate-heart rate is still remained uncontrolled   thyroid ultrasound reviewed  Lipid panel normal  Check thyroid antibodies panel  FT4: 1 91, FT3: 4 59, TSI:0 63    * [facility] )  Assessment & Plan  Present on admission-patient's heart rate still remained uncontrolled  [id number]  Patient also has been suffering with hyperthyroidism based on clinical symptoms and lab findings-most likely the cause of Afib  As per EKG patient is having AFib with RVR  Patient received multiple doses of digoxin, Cardizem, IV Lopressor as well as Cardizem drip  Later on metoprolol started and dose increased to 100 mg q 6 hours as per Cardiology  Given with above medications, no significant change in heart rate    After discussion with Cardiology, who recommends to started propranolol  Patient already started on Xarelto  To control heart rate will be difficult until thyroid functions suppressed  Reach out to endocrinologist for consideration of increase dose of methimazole-awaiting for response  The potassium>4, magnesium>2       KAELYN (acute kidney injury) ([facility] )  Assessment & Plan  Baseline creatinine is 1 2  Improved    Tobacco abuse  Assessment & Plan  Patient is cutting it down  Continue to encourage    Increased anion gap metabolic acidosis  Assessment & Plan  Anion gap normalized  Could be secondary to KAELYN  Patient does have AFib, tachypneic elevated WBC-but no known source of infection, most likely secondary to hypothyroidism  Resolved          VTE Pharmacologic Prophylaxis:   Pharmacologic: Rivaroxaban (Xarelto)  Mechanical VTE Prophylaxis in Place: Yes    Patient Centered Rounds: I have performed bedside rounds with nursing staff today  Education and Discussions with Family / Patient:  Yes with patient    Time Spent for Care: 15 minutes  More than 50% of total time spent on counseling and coordination of care as described above  Current Length of Stay: 5 day(s)    Current Patient Status: Inpatient   Certification Statement: The patient will continue to require additional inpatient hospital stay due to Hyperthyroidism, AFib uncontrolled    Discharge Plan / Estimated Discharge Date: To be determined      Code Status: Level 1 - Full Code      Subjective:   Seen and evaluated during the round  Patient denies any significant complaint  Objective:     Vitals:   Temp (24hrs), Av 9 °F (36 6 °C), Min:97 7 °F (36 5 °C), Max:98 1 °F (36 7 °C)    Temp:  [97 7 °F (36 5 °C)-98 1 °F (36 7 °C)] 97 8 °F (36 6 °C)  HR:  [128-144] 139  Resp:  [20-39] 27  BP: (119-136)/() 119/93  SpO2:  [94 %-98 %] 98 %  Body mass index is 39 59 kg/m²  Input and Output Summary (last 24 hours): Intake/Output Summary (Last 24 hours) at 2021 1008  Last data filed at 2021 0801  Gross per 24 hour   Intake 240 ml   Output 1025 ml   Net -785 ml       Physical Exam:     Physical Exam  Vitals and nursing note reviewed  Constitutional:       Appearance: She is not diaphoretic  HENT:      Head: Normocephalic  Nose: No congestion  Mouth/Throat:      Mouth: Mucous membranes are moist       Pharynx: No oropharyngeal exudate  Eyes:      General: No scleral icterus       Conjunctiva/sclera: Conjunctivae normal       Pupils: Pupils are equal, round, and reactive to light  Cardiovascular:      Rate and Rhythm: Tachycardia present  Rhythm irregular  Heart sounds: No friction rub  No gallop  Pulmonary:      Effort: Pulmonary effort is normal  No respiratory distress  Breath sounds: No stridor  No wheezing or rhonchi  Abdominal:      General: Abdomen is flat  Bowel sounds are normal  There is no distension  Palpations: There is no mass  Tenderness: There is no abdominal tenderness  Hernia: No hernia is present  Musculoskeletal:         General: Normal range of motion  Cervical back: Normal range of motion  Right lower leg: No edema  Lymphadenopathy:      Cervical: No cervical adenopathy  Skin:     General: Skin is warm  Neurological:      General: No focal deficit present  Mental Status: She is alert and oriented to person, place, and time  Cranial Nerves: No cranial nerve deficit  Sensory: No sensory deficit  Motor: No weakness  Coordination: Coordination normal          Additional Data:     Labs:    Results from last 7 days   Lab Units 06/27/21  0509   WBC Thousand/uL 9 70   HEMOGLOBIN g/dL 12 7   HEMATOCRIT % 39 2   PLATELETS Thousands/uL 192   NEUTROS PCT % 59   LYMPHS PCT % 30   MONOS PCT % 9   EOS PCT % 2     Results from last 7 days   Lab Units 06/27/21  0509   POTASSIUM mmol/L 4 8   CHLORIDE mmol/L 106   CO2 mmol/L 20*   BUN mg/dL 27*   CREATININE mg/dL 1 10   CALCIUM mg/dL 9 2   ALK PHOS U/L 67   ALT U/L 10*   AST U/L 14     Results from last 7 days   Lab Units 06/22/21  1043   INR  2 38*       * I Have Reviewed All Lab Data Listed Above  * Additional Pertinent Lab Tests Reviewed:  All Labs Within Last 24 Hours Reviewed            Last 24 Hours Medication List:   Current Facility-Administered Medications   Medication Dose Route Frequency Provider Last Rate    albuterol  2 puff Inhalation Q4H PRN MD Darci Hansen ALPRAZolam  0 5 mg Oral TID PRN Luis Adkins MD      docusate sodium  100 mg Oral BID Luis Adkins MD      fluticasone  2 puff Inhalation Q12H [facility] Luis Adkins MD      magnesium oxide  400 mg Oral BID Luis Adkins MD      methimazole  5 mg Oral Daily Luis Adkins MD      metoprolol  5 mg Intravenous Q6H PRN Luis Adkins MD      nicotine  1 patch Transdermal Daily Luis Adkins MD      ondansetron  4 mg Intravenous Q6H PRN Luis Adkins MD      propranolol  120 mg Oral Q6H [facility] LINDA Piña      rivaroxaban  20 mg Oral Daily With Gunnar Morales MD          Today, Patient Was Seen By: Luis Adkins MD    ** Please Note: Dragon 360 Dictation voice to text software may have been used in the creation of this document   **

## 2021-06-27 NOTE — ASSESSMENT & PLAN NOTE
Based on clinical symptoms (inside, tremor, weight loss, hot intolerance, palpitation) with elevated free T4, low TSH level  Discussed the case via tiger text with on-call endocrinologist-recommends to start 5 mg methimazole daily and repeat TSH and free T4 in 4-6 weeks  Reached out to endocrinologist again to get recommendation regarding methimazole dose adjustment-awaiting for response  Continue beta-blocker to control heart rate-heart rate is still remained uncontrolled   thyroid ultrasound reviewed  Lipid panel normal  Check thyroid antibodies panel  FT4: 1 91, FT3: 4 59, TSI:0 63

## 2021-06-28 ENCOUNTER — APPOINTMENT (INPATIENT)
Dept: NON INVASIVE DIAGNOSTICS | Facility: HOSPITAL | Age: 66
DRG: 644 | End: 2021-06-28
Payer: COMMERCIAL

## 2021-06-28 LAB — TSH RECEP AB SER-ACNC: 3.07 IU/L (ref 0–1.75)

## 2021-06-28 PROCEDURE — 99232 SBSQ HOSP IP/OBS MODERATE 35: CPT | Performed by: FAMILY MEDICINE

## 2021-06-28 PROCEDURE — 93308 TTE F-UP OR LMTD: CPT

## 2021-06-28 RX ORDER — DILTIAZEM HYDROCHLORIDE 120 MG/1
120 CAPSULE, COATED, EXTENDED RELEASE ORAL 2 TIMES DAILY
Status: DISCONTINUED | OUTPATIENT
Start: 2021-06-28 | End: 2021-06-29

## 2021-06-28 RX ORDER — METHIMAZOLE 5 MG/1
10 TABLET ORAL DAILY
Status: DISCONTINUED | OUTPATIENT
Start: 2021-06-29 | End: 2021-07-02 | Stop reason: HOSPADM

## 2021-06-28 RX ORDER — METHIMAZOLE 5 MG/1
5 TABLET ORAL ONCE
Status: COMPLETED | OUTPATIENT
Start: 2021-06-28 | End: 2021-06-28

## 2021-06-28 RX ADMIN — DILTIAZEM HYDROCHLORIDE 120 MG: 120 CAPSULE, COATED, EXTENDED RELEASE ORAL at 10:18

## 2021-06-28 RX ADMIN — PROPRANOLOL HYDROCHLORIDE 120 MG: 20 TABLET ORAL at 05:14

## 2021-06-28 RX ADMIN — PROPRANOLOL HYDROCHLORIDE 120 MG: 20 TABLET ORAL at 19:00

## 2021-06-28 RX ADMIN — METHIMAZOLE 5 MG: 5 TABLET ORAL at 08:30

## 2021-06-28 RX ADMIN — RIVAROXABAN 20 MG: 20 TABLET, FILM COATED ORAL at 07:52

## 2021-06-28 RX ADMIN — Medication 400 MG: at 08:30

## 2021-06-28 RX ADMIN — DOCUSATE SODIUM 100 MG: 100 CAPSULE, LIQUID FILLED ORAL at 19:01

## 2021-06-28 RX ADMIN — METHIMAZOLE 5 MG: 5 TABLET ORAL at 11:49

## 2021-06-28 RX ADMIN — DILTIAZEM HYDROCHLORIDE 120 MG: 120 CAPSULE, COATED, EXTENDED RELEASE ORAL at 20:39

## 2021-06-28 RX ADMIN — PROPRANOLOL HYDROCHLORIDE 120 MG: 20 TABLET ORAL at 11:48

## 2021-06-28 RX ADMIN — Medication 400 MG: at 19:01

## 2021-06-28 RX ADMIN — DOCUSATE SODIUM 100 MG: 100 CAPSULE, LIQUID FILLED ORAL at 08:30

## 2021-06-28 RX ADMIN — FLUTICASONE PROPIONATE 2 PUFF: 110 AEROSOL, METERED RESPIRATORY (INHALATION) at 20:40

## 2021-06-28 RX ADMIN — Medication 1 PATCH: at 08:30

## 2021-06-28 RX ADMIN — FLUTICASONE PROPIONATE 2 PUFF: 110 AEROSOL, METERED RESPIRATORY (INHALATION) at 08:30

## 2021-06-28 NOTE — ASSESSMENT & PLAN NOTE
Based on clinical symptoms (inside, tremor, weight loss, hot intolerance, palpitation) with elevated free T4, low TSH level  Discussed the case via tiger text with on-call endocrinologist-recommends to start 5 mg methimazole daily and repeat TSH and free T4 in 4-6 weeks  Reached out to endocrinologist again to get recommendation regarding methimazole dose adjustment-will increase methimazole dose 10 mg from a m    Continue beta-blocker to control heart rate-heart rate is still remained uncontrolled, Cardizem added   thyroid ultrasound reviewed  Lipid panel normal  Check thyroid antibodies panel  FT4: 1 91, FT3: 4 59, TSI:0 63

## 2021-06-28 NOTE — PROGRESS NOTES
[address]  Progress Note - Gonzalo 86 1955, 77 y o  female MRN: [id number]  Unit/Bed#: -01 Encounter: [id number]  Primary Care Provider: Dori Agustin DO   Date and time admitted to hospital: 6/22/2021 10:15 AM    Hyperthyroidism  Assessment & Plan  Based on clinical symptoms (inside, tremor, weight loss, hot intolerance, palpitation) with elevated free T4, low TSH level  Discussed the case via tiger text with on-call endocrinologist-recommends to start 5 mg methimazole daily and repeat TSH and free T4 in 4-6 weeks  Reached out to endocrinologist again to get recommendation regarding methimazole dose adjustment-will increase methimazole dose 10 mg from a m  Continue beta-blocker to control heart rate-heart rate is still remained uncontrolled, Cardizem added   thyroid ultrasound reviewed  Lipid panel normal  Check thyroid antibodies panel  FT4: 1 91, FT3: 4 59, TSI:0 63    * New onset a-fib ([facility])  Assessment & Plan  Present on admission-patient's heart rate still remained uncontrolled  [id number]=0  Limited echo shows ejection fraction 50%  Patient also has been suffering with hyperthyroidism based on clinical symptoms and lab findings-most likely the cause of Afib  As per EKG patient is having AFib with RVR  Patient received multiple doses of digoxin, Cardizem, IV Lopressor as well as Cardizem drip  Later on metoprolol started and dose increased to 100 mg q 6 hours as per Cardiology  Given with above medications, no significant change in heart rate  After discussion with Cardiology, who recommends to started propranolol-continue propranolol 120 mg q 6 hours, continue Cardizem 120 b i d    Patient already started on Xarelto  To control heart rate will be difficult until thyroid functions suppressed  Reach out to endocrinologist for consideration of increase dose of methimazole-after discussing and cardiologist, will increase methimazole dose to 10 mg in a m   The potassium>4, magnesium>2       KAELYN (acute kidney injury) ([facility] )  Assessment & Plan  Baseline creatinine is 1 2  Improved    Tobacco abuse  Assessment & Plan  Patient is cutting it down  Continue to encourage    Increased anion gap metabolic acidosis  Assessment & Plan  Anion gap normalized  Could be secondary to KAELYN  Patient does have AFib, tachypneic elevated WBC-but no known source of infection, most likely secondary to hypothyroidism  Resolved        VTE Pharmacologic Prophylaxis:   Pharmacologic: Rivaroxaban (Xarelto)  Mechanical VTE Prophylaxis in Place: Yes    Patient Centered Rounds: I have performed bedside rounds with nursing staff today  Discussions with Specialists or Other Care Team Provider:  Cardiac    Education and Discussions with Family / Patient:  Yes with patient    Time Spent for Care: 15 minutes  More than 50% of total time spent on counseling and coordination of care as described above  Current Length of Stay: 6 day(s)    Current Patient Status: Inpatient   Certification Statement: The patient will continue to require additional inpatient hospital stay due to AFib with RVR, hyperthyroidism    Discharge Plan / Estimated Discharge Date: To be determined      Code Status: Level 1 - Full Code      Subjective:   Seen and evaluated during done  Patient denies any significant complaint  Heart rate is still remained uncontrolled    Objective:     Vitals:   Temp (24hrs), Av 7 °F (36 5 °C), Min:97 1 °F (36 2 °C), Max:98 1 °F (36 7 °C)    Temp:  [97 1 °F (36 2 °C)-98 1 °F (36 7 °C)] 97 5 °F (36 4 °C)  HR:  [118-138] 122  Resp:  [20-38] 31  BP: ()/(68-95) 146/68  SpO2:  [97 %-99 %] 99 %  Body mass index is 39 59 kg/m²  Input and Output Summary (last 24 hours):        Intake/Output Summary (Last 24 hours) at 2021 1023  Last data filed at 2021 1007  Gross per 24 hour   Intake 1040 ml   Output 900 ml   Net 140 ml       Physical Exam:     Physical Exam  Vitals and nursing note reviewed  Exam conducted with a chaperone present  Constitutional:       Appearance: She is not diaphoretic  HENT:      Head: Normocephalic  Nose: Nose normal  No congestion  Mouth/Throat:      Mouth: Mucous membranes are moist       Pharynx: No oropharyngeal exudate  Eyes:      General: No scleral icterus  Conjunctiva/sclera: Conjunctivae normal       Pupils: Pupils are equal, round, and reactive to light  Cardiovascular:      Rate and Rhythm: Tachycardia present  Rhythm irregular  Heart sounds: No friction rub  No gallop  Pulmonary:      Effort: Pulmonary effort is normal  No respiratory distress  Breath sounds: No stridor  No wheezing or rhonchi  Abdominal:      General: Abdomen is flat  Bowel sounds are normal  There is no distension  Palpations: There is no mass  Tenderness: There is no abdominal tenderness  Hernia: No hernia is present  Musculoskeletal:         General: Normal range of motion  Cervical back: Normal range of motion  Right lower leg: No edema  Left lower leg: No edema  Lymphadenopathy:      Cervical: No cervical adenopathy  Skin:     General: Skin is warm  Capillary Refill: Capillary refill takes less than 2 seconds  Findings: No lesion  Neurological:      General: No focal deficit present  Mental Status: She is alert  Cranial Nerves: No cranial nerve deficit  Sensory: No sensory deficit  Motor: No weakness        Coordination: Coordination normal          Additional Data:     Labs:    Results from last 7 days   Lab Units 06/27/21  0509   WBC Thousand/uL 9 70   HEMOGLOBIN g/dL 12 7   HEMATOCRIT % 39 2   PLATELETS Thousands/uL 192   NEUTROS PCT % 59   LYMPHS PCT % 30   MONOS PCT % 9   EOS PCT % 2     Results from last 7 days   Lab Units 06/27/21  0509   POTASSIUM mmol/L 4 8   CHLORIDE mmol/L 106   CO2 mmol/L 20*   BUN mg/dL 27*   CREATININE mg/dL 1 10   CALCIUM mg/dL 9 2   ALK PHOS U/L 67   ALT U/L 10*   AST U/L 14     Results from last 7 days   Lab Units 06/22/21  1043   INR  2 38*       * I Have Reviewed All Lab Data Listed Above  * Additional Pertinent Lab Tests Reviewed: All Labs Within Last 24 Hours Reviewed    Last 24 Hours Medication List:   Current Facility-Administered Medications   Medication Dose Route Frequency Provider Last Rate    albuterol  2 puff Inhalation Q4H PRN Lewis Erwin MD      ALPRAZolam  0 5 mg Oral TID PRN Lewis Erwin MD      diltiazem  120 mg Oral BID [place], PA-C      docusate sodium  100 mg Oral BID Lewis Erwin MD      fluticasone  2 puff Inhalation Q12H [facility] Lewis Erwin MD      magnesium oxide  400 mg Oral BID Lewis Erwin MD     [facility] ON 6/29/2021] methimazole  10 mg Oral Daily Lewis Erwin MD      metoprolol  5 mg Intravenous Q6H PRN Lewis Erwin MD      nicotine  1 patch Transdermal Daily Aminta Lenz MD      ondansetron  4 mg Intravenous Q6H PRN Lewis Erwin MD      propranolol  120 mg Oral Q6H [facility] LINDA Jimenez      rivaroxaban  20 mg Oral Daily With Cosme Nelson MD          Today, Patient Was Seen By: Lewis Erwin MD    ** Please Note: Dragon 360 Dictation voice to text software may have been used in the creation of this document   **

## 2021-06-28 NOTE — ASSESSMENT & PLAN NOTE
Present on admission-patient's heart rate still remained uncontrolled  [id number]  Limited echo shows ejection fraction 50%  Patient also has been suffering with hyperthyroidism based on clinical symptoms and lab findings-most likely the cause of Afib  As per EKG patient is having AFib with RVR  Patient received multiple doses of digoxin, Cardizem, IV Lopressor as well as Cardizem drip  Later on metoprolol started and dose increased to 100 mg q 6 hours as per Cardiology  Given with above medications, no significant change in heart rate  After discussion with Cardiology, who recommends to started propranolol-continue propranolol 120 mg q 6 hours, continue Cardizem 120 b i d  Patient already started on Xarelto  To control heart rate will be difficult until thyroid functions suppressed  Reach out to endocrinologist for consideration of increase dose of methimazole-after discussing and cardiologist, will increase methimazole dose to 10 mg in a m    The potassium>4, magnesium>2

## 2021-06-28 NOTE — PROGRESS NOTES
Progress Note - Cardiology   Park Santamaria 77 y o  female MRN: [id number]  Unit/Bed#: -01 Encounter: [id number]    Assessment:  New onset afib  Hyperthyroidism- on methimazole, to discuss with endo uptitrating dose  KAELYN- resolved  Increase anion gap metabolic acidosis- resolved  Unknown LVEF    Plan:  1  Will do echo to assess LVEF  If normal can use cardizem for better rate control  2  Continue propanolol at present dose  3  Continue anticoagulation  4  Once thyroid better controlled if patient doesn't convert on her own will need to set up for cardioversion    Subjective/Objective     Subjective: pt reports palpitations intermittently but have improved significantly since hospital stay  She has no chest pain  She has no sob  She has no lightheadedness or dizziness  Otherwise feeling back to her baseline    Objective: sitting comfortably in hospital bed  Vitals: /68 (BP Location: Right arm)   Pulse (!) 122   Temp 97 5 °F (36 4 °C) (Oral)   Resp (!) 31   Ht 5' 8" (1 727 m)   Wt 118 kg (260 lb 5 8 oz)   SpO2 99%   BMI 39 59 kg/m²   Vitals:    06/25/21 0536 06/27/21 0551   Weight: 111 kg (243 lb 13 3 oz) 118 kg (260 lb 5 8 oz)     Orthostatic Blood Pressures      Most Recent Value   Blood Pressure  146/68 filed at 06/28/2021 8672   Patient Position - Orthostatic VS  Lying filed at 06/28/2021 0655            Intake/Output Summary (Last 24 hours) at 6/28/2021 0900  Last data filed at 6/28/2021 0756  Gross per 24 hour   Intake 1040 ml   Output 675 ml   Net 365 ml       Invasive Devices     Peripheral Intravenous Line            Peripheral IV 06/25/21 Dorsal (posterior); Left Wrist 3 days                    Physical Exam: /68 (BP Location: Right arm)   Pulse (!) 122   Temp 97 5 °F (36 4 °C) (Oral)   Resp (!) 31   Ht 5' 8" (1 727 m)   Wt 118 kg (260 lb 5 8 oz)   SpO2 99%   BMI 39 59 kg/m²   General appearance: alert and oriented, in no acute distress  Neck: no JVD and supple, symmetrical, trachea midline  Lungs: clear to auscultation bilaterally  Heart: irregularly irregular  Skin: Skin color, texture, turgor normal  No rashes or lesions  Neurologic: Grossly normal    Lab Results:   I have personally reviewed pertinent lab results  CBC with diff:   Results from last 7 days   Lab Units 06/27/21  0509   WBC Thousand/uL 9 70   RBC Million/uL 4 24   HEMOGLOBIN g/dL 12 7   HEMATOCRIT % 39 2   MCV fL 93   MCH pg 30 0   MCHC g/dL 32 4   RDW % 13 2   MPV fL 10 7   PLATELETS Thousands/uL 192     CMP:   Results from last 7 days   Lab Units 06/27/21  0509   SODIUM mmol/L 136   POTASSIUM mmol/L 4 8   CHLORIDE mmol/L 106   CO2 mmol/L 20*   BUN mg/dL 27*   CREATININE mg/dL 1 10   CALCIUM mg/dL 9 2   AST U/L 14   ALT U/L 10*   ALK PHOS U/L 67   EGFR ml/min/1 73sq m 52     Troponin:   0   Lab Value Date/Time    TROPONINI <0 02 06/22/2021 1043     BNP:   Results from last 7 days   Lab Units 06/27/21  0509   POTASSIUM mmol/L 4 8   CHLORIDE mmol/L 106   CO2 mmol/L 20*   BUN mg/dL 27*   CREATININE mg/dL 1 10   CALCIUM mg/dL 9 2   EGFR ml/min/1 73sq m 52     Coags:   Results from last 7 days   Lab Units 06/22/21  1043   PTT seconds 34   INR  2 38*     TSH:     Magnesium:   Results from last 7 days   Lab Units 06/26/21  0518   MAGNESIUM mg/dL 2 3     Imaging: I have personally reviewed pertinent reports         Awaiting echo report    EKG: telemetry revealing afib with ventricular rates 130-140

## 2021-06-29 PROBLEM — E87.29 INCREASED ANION GAP METABOLIC ACIDOSIS: Status: RESOLVED | Noted: 2021-06-22 | Resolved: 2021-06-29

## 2021-06-29 PROBLEM — R60.0 BILATERAL LOWER EXTREMITY EDEMA: Status: ACTIVE | Noted: 2021-06-29

## 2021-06-29 PROBLEM — E87.2 INCREASED ANION GAP METABOLIC ACIDOSIS: Status: RESOLVED | Noted: 2021-06-22 | Resolved: 2021-06-29

## 2021-06-29 LAB — TSI SER-ACNC: 0.66 IU/L (ref 0–0.55)

## 2021-06-29 PROCEDURE — 99232 SBSQ HOSP IP/OBS MODERATE 35: CPT | Performed by: FAMILY MEDICINE

## 2021-06-29 RX ORDER — POTASSIUM CHLORIDE 20 MEQ/1
20 TABLET, EXTENDED RELEASE ORAL ONCE
Status: COMPLETED | OUTPATIENT
Start: 2021-06-29 | End: 2021-06-29

## 2021-06-29 RX ORDER — DILTIAZEM HYDROCHLORIDE 180 MG/1
180 CAPSULE, COATED, EXTENDED RELEASE ORAL 2 TIMES DAILY
Status: DISCONTINUED | OUTPATIENT
Start: 2021-06-29 | End: 2021-07-02 | Stop reason: HOSPADM

## 2021-06-29 RX ORDER — DILTIAZEM HYDROCHLORIDE 60 MG/1
60 TABLET, FILM COATED ORAL ONCE
Status: COMPLETED | OUTPATIENT
Start: 2021-06-29 | End: 2021-06-29

## 2021-06-29 RX ORDER — FUROSEMIDE 10 MG/ML
40 INJECTION INTRAMUSCULAR; INTRAVENOUS ONCE
Status: COMPLETED | OUTPATIENT
Start: 2021-06-29 | End: 2021-06-29

## 2021-06-29 RX ADMIN — FLUTICASONE PROPIONATE 2 PUFF: 110 AEROSOL, METERED RESPIRATORY (INHALATION) at 08:34

## 2021-06-29 RX ADMIN — Medication 400 MG: at 08:34

## 2021-06-29 RX ADMIN — POTASSIUM CHLORIDE 20 MEQ: 1500 TABLET, EXTENDED RELEASE ORAL at 09:22

## 2021-06-29 RX ADMIN — PROPRANOLOL HYDROCHLORIDE 120 MG: 20 TABLET ORAL at 05:22

## 2021-06-29 RX ADMIN — PROPRANOLOL HYDROCHLORIDE 120 MG: 20 TABLET ORAL at 12:06

## 2021-06-29 RX ADMIN — DILTIAZEM HYDROCHLORIDE 60 MG: 60 TABLET, FILM COATED ORAL at 09:22

## 2021-06-29 RX ADMIN — Medication 400 MG: at 18:27

## 2021-06-29 RX ADMIN — Medication 1 PATCH: at 08:34

## 2021-06-29 RX ADMIN — DOCUSATE SODIUM 100 MG: 100 CAPSULE, LIQUID FILLED ORAL at 18:27

## 2021-06-29 RX ADMIN — DILTIAZEM HYDROCHLORIDE 180 MG: 180 CAPSULE, COATED, EXTENDED RELEASE ORAL at 20:35

## 2021-06-29 RX ADMIN — DOCUSATE SODIUM 100 MG: 100 CAPSULE, LIQUID FILLED ORAL at 08:34

## 2021-06-29 RX ADMIN — PROPRANOLOL HYDROCHLORIDE 120 MG: 20 TABLET ORAL at 01:15

## 2021-06-29 RX ADMIN — PROPRANOLOL HYDROCHLORIDE 120 MG: 20 TABLET ORAL at 18:26

## 2021-06-29 RX ADMIN — FLUTICASONE PROPIONATE 2 PUFF: 110 AEROSOL, METERED RESPIRATORY (INHALATION) at 20:36

## 2021-06-29 RX ADMIN — METHIMAZOLE 10 MG: 5 TABLET ORAL at 08:34

## 2021-06-29 RX ADMIN — FUROSEMIDE 40 MG: 10 INJECTION, SOLUTION INTRAMUSCULAR; INTRAVENOUS at 09:22

## 2021-06-29 RX ADMIN — DILTIAZEM HYDROCHLORIDE 120 MG: 120 CAPSULE, COATED, EXTENDED RELEASE ORAL at 08:34

## 2021-06-29 RX ADMIN — RIVAROXABAN 20 MG: 20 TABLET, FILM COATED ORAL at 07:42

## 2021-06-29 RX ADMIN — PROPRANOLOL HYDROCHLORIDE 120 MG: 20 TABLET ORAL at 23:51

## 2021-06-29 NOTE — ASSESSMENT & PLAN NOTE
· Rate still uncontrolled up to 146(new onset atrial fibrillation present on admission)- patient unable to be cardioverted till she has better control of her thyroid  · [id number]-tm Xarelto 20 mg daily  · Limited echo shows ejection fraction 50% mild to moderate TR and MR  · Patient also has been suffering with hyperthyroidism based on clinical symptoms and lab findings-most likely the cause of Afib  · Discussed with Cardiology titrate up Cardizem CD to 180 mf po bid- extra 60 mg given today  She is also on propranolol 120 mg p o  Q 6 hours    Will keep level to step-down  · Hyperthyroidism medication has been adjusted and up titrated  · Patient did receive a previous dose of digoxin her digit level is 1 1 on 06/26 patient is currently not on digoxin daily

## 2021-06-29 NOTE — PROGRESS NOTES
Progress Note - Cardiology   Demarcus Santamaria 77 y o  female MRN: [id number]  Unit/Bed#: -01 Encounter: [id number]    Assessment:  New onset afib- rates better but still high  Hyperthyroidism- on methimazole, now up-titrating dose   KAELYN- resolved  Increase anion gap metabolic acidosis- resolved  Unknown LVEF    Plan:  1  Up-titrate cardizem to 180 mg PO BID, will give an additional 60 mg this AM  2  Continue propanolol at present dose  3  Continue anticoagulation  4  Once thyroid better controlled if patient doesn't convert on her own will need to set up for cardioversion  5  Agree with lasix 40 IV x 1 this AM    Subjective/Objective     Subjective: pt reports some edema this morning  She has no further cardiac complaints  Uneventful night    Objective: sitting comfortably in hospital chair   HR on tele 115-120    Vitals: /87 (BP Location: Right arm)   Pulse (!) 112   Temp 98 7 °F (37 1 °C) (Axillary)   Resp (!) 24   Ht 5' 8" (1 727 m)   Wt 112 kg (247 lb 2 2 oz)   SpO2 98%   BMI 37 58 kg/m²   Vitals:    06/27/21 0551 06/29/21 0600   Weight: 118 kg (260 lb 5 8 oz) 112 kg (247 lb 2 2 oz)     Orthostatic Blood Pressures      Most Recent Value   Blood Pressure  141/87 filed at 06/29/2021 0700   Patient Position - Orthostatic VS  Sitting filed at 06/29/2021 0700            Intake/Output Summary (Last 24 hours) at 6/29/2021 0925  Last data filed at 6/29/2021 0601  Gross per 24 hour   Intake 480 ml   Output 1100 ml   Net -620 ml       Invasive Devices     Peripheral Intravenous Line            Peripheral IV 06/29/21 Right;Ventral (anterior) Forearm <1 day                    Physical Exam: /87 (BP Location: Right arm)   Pulse (!) 112   Temp 98 7 °F (37 1 °C) (Axillary)   Resp (!) 24   Ht 5' 8" (1 727 m)   Wt 112 kg (247 lb 2 2 oz)   SpO2 98%   BMI 37 58 kg/m²   General appearance: alert and oriented, in no acute distress  Neck: no JVD and supple, symmetrical, trachea midline  Lungs: clear to auscultation bilaterally  Heart: irregularly irregular  Skin: Skin color, texture, turgor normal  No rashes or lesions  Neurologic: Grossly normal   Extremities: 1+ pitting edema bilaterally    Lab Results:   I have personally reviewed pertinent lab results  CBC with diff:   Results from last 7 days   Lab Units 06/27/21  0509   WBC Thousand/uL 9 70   RBC Million/uL 4 24   HEMOGLOBIN g/dL 12 7   HEMATOCRIT % 39 2   MCV fL 93   MCH pg 30 0   MCHC g/dL 32 4   RDW % 13 2   MPV fL 10 7   PLATELETS Thousands/uL 192     CMP:   Results from last 7 days   Lab Units 06/27/21  0509   SODIUM mmol/L 136   POTASSIUM mmol/L 4 8   CHLORIDE mmol/L 106   CO2 mmol/L 20*   BUN mg/dL 27*   CREATININE mg/dL 1 10   CALCIUM mg/dL 9 2   AST U/L 14   ALT U/L 10*   ALK PHOS U/L 67   EGFR ml/min/1 73sq m 52     Troponin:   0   Lab Value Date/Time    TROPONINI <0 02 06/22/2021 1043     BNP:   Results from last 7 days   Lab Units 06/27/21  0509   POTASSIUM mmol/L 4 8   CHLORIDE mmol/L 106   CO2 mmol/L 20*   BUN mg/dL 27*   CREATININE mg/dL 1 10   CALCIUM mg/dL 9 2   EGFR ml/min/1 73sq m 52     Coags:   Results from last 7 days   Lab Units 06/22/21  1043   PTT seconds 34   INR  2 38*     TSH:     Magnesium:   Results from last 7 days   Lab Units 06/26/21  0518   MAGNESIUM mg/dL 2 3     Imaging: I have personally reviewed pertinent reports  Echo 6/28/21:  LEFT VENTRICLE:  Ejection fraction was estimated in the range of 50 %  RIGHT ATRIUM:  The atrium was mildly dilated  MITRAL VALVE:  There was mild to moderate regurgitation  TRICUSPID VALVE:  There was mild to moderate regurgitation  Estimated peak PA pressure was 55 mmHg    IVC, HEPATIC VEINS:  The inferior vena cava was dilated    EKG: telemetry revealing afib with ventricular rates 115-120 bpm

## 2021-06-29 NOTE — UTILIZATION REVIEW
Inpatient Admission Authorization Request   NOTIFICATION OF INPATIENT ADMISSION/INPATIENT AUTHORIZATION REQUEST   SERVICING FACILITY:   [address] [place] [address]  Laura Cunningham [address] [place] [address], [address]  Tax ID: [id number]  NPI: [id number]  Place of Service: Inpatient [facility]  60[address]  Place of Service Code: 24     ATTENDING PROVIDER:  Attending Name and NPI#: Lincoln Ben, Parker Bermeo Angela [[id number]]  Address: Laura Cunningham [address] [place] [address], [address]  Phone: [phone]     UTILIZATION REVIEW CONTACT:  Lauryn Nowak Utilization   Network Utilization Review Department  Phone: [phone]  Fax [phone]  Email: [email or web address]     PHYSICIAN ADVISORY SERVICES:  FOR [id number] REVIEW - MEDICAL NECESSITY DENIAL  Phone: [phone]  Fax: [phone]  Email: [email or web address]     TYPE OF REQUEST:  Inpatient Status     ADMISSION INFORMATION:  ADMISSION DATE/TIME: 6/22/21  1:38 PM  PATIENT DIAGNOSIS CODE/DESCRIPTION:  Leukocytosis [D72 829]  Atrial fibrillation with rapid ventricular response (HCC) [I48 91]  Elevated brain natriuretic peptide (BNP) level [R79 89]  Low TSH level [R79 89]  Unspecified atrial fibrillation [I48 91]  DISCHARGE DATE/TIME: No discharge date for patient encounter  DISCHARGE DISPOSITION (IF DISCHARGED): Final discharge disposition not confirmed     IMPORTANT INFORMATION:  Please contact the Lauryn Nowak directly with any questions or concerns regarding this request  Department voicemails are confidential     Send requests for admission clinical reviews, concurrent reviews, approvals, and administrative denials due to lack of clinical to fax [phone]

## 2021-06-29 NOTE — ASSESSMENT & PLAN NOTE
· KAELYN on CKD stage 3 patient's baseline creatinine is 1 2 has resolved, monitor closely while on diuresis

## 2021-06-29 NOTE — UTILIZATION REVIEW
Continued Stay Review    Date:    6/29/21                          Current Patient Class:    Inpatient  Current Level of Care:   Stepdown   Level  2    HPI:65 y o  female initially admitted on    6/22/2021 inpatient due to new onset atrial fibrillation/KAELYN/Hyperthyroidism     Assessment/Plan:   6/29  Heart   Rate improving but still elevated  Increase cardizem dose,  Given an additional dose this am   Continue anticoagulation  Give  1 dose  IV  Lasix this am   May need cardioversion if  Patient  Does not  Convert spontaneously  Legs remain swollen,   LLE  > RLE  Continue compression stockings  Elevate legs        Vital Signs:   98 7 °F (37 1 °C)  112Abnormal   24Abnormal   141/87  107  98 %  None (Room air)  Sitting     06/29/21 0521  --  127Abnormal   29Abnormal   99/65  77  98 %  --  Lying   06/29/21 0351  97 7 °F (36 5 °C)  115Abnormal   47Abnormal   132/96  110  96 %  None (Room air)  Lying   06/29/21 0115  --  127Abnormal   --  121/65  --  --  --           Pertinent Labs/Diagnostic Results:   CXR  ( 6/27)  NAD      Results from last 7 days   Lab Units 06/27/21  0509 06/25/21  0820 06/24/21  0433 06/23/21  0559   WBC Thousand/uL 9 70 11 23* 13 10* 14 26*   HEMOGLOBIN g/dL 12 7 12 6 11 7 12 2   HEMATOCRIT % 39 2 38 4 35 4 36 6   PLATELETS Thousands/uL 192 187 167 163   NEUTROS ABS Thousands/µL 5 76 8 01* 9 44* 10 91*         Results from last 7 days   Lab Units 06/27/21  0509 06/26/21  0518 06/25/21  0925 06/24/21  0433 06/23/21  0610   SODIUM mmol/L 136 136 136 137 136   POTASSIUM mmol/L 4 8 4 7 3 8 3 9 3 9   CHLORIDE mmol/L 106 106 105 107 107   CO2 mmol/L 20* 21 21 20* 19*   ANION GAP mmol/L 10 9 10 10 10   BUN mg/dL 27* 30* 33* 34* 43*   CREATININE mg/dL 1 10 1 16 1 30 1 32* 1 31*   EGFR ml/min/1 73sq m 52 49 43 42 42   CALCIUM mg/dL 9 2 8 8 8 8 8 9 8 6   MAGNESIUM mg/dL  --  2 3  --  1 8 1 7     Results from last 7 days   Lab Units 06/27/21  0509 06/24/21  0433 06/22/21  1722   AST U/L 14 10 9   ALT U/L 10* 12 13   ALK PHOS U/L 67 67 70   TOTAL PROTEIN g/dL 6 8 6 3* 6 6   ALBUMIN g/dL 2 6* 2 5* 2 8*   TOTAL BILIRUBIN mg/dL 0 53 0 83 0 79         Results from last 7 days   Lab Units 06/27/21  0509 06/26/21  0518 06/25/21  0925 06/24/21  0433 06/23/21  0610 06/22/21  1722   GLUCOSE RANDOM mg/dL 114 105 147* 113 117 136           Results from last 7 days   Lab Units 06/26/21  0518   DIGOXIN LVL ng/mL 1 1     Results from last 7 days   Lab Units 06/26/21  0022   NT-PRO BNP pg/mL 7,072*         Medications:   Scheduled Medications:  diltiazem, 180 mg, Oral, BID  docusate sodium, 100 mg, Oral, BID  fluticasone, 2 puff, Inhalation, Q12H SABINE  magnesium oxide, 400 mg, Oral, BID  methimazole, 10 mg, Oral, Daily  nicotine, 1 patch, Transdermal, Daily  propranolol, 120 mg, Oral, Q6H SABINE  rivaroxaban, 20 mg, Oral, Daily With Breakfast  IV  Lasix   X1  Dose  6/29      Continuous IV Infusions:     PRN Meds:  albuterol, 2 puff, Inhalation, Q4H PRN  ALPRAZolam, 0 5 mg, Oral, TID PRN  metoprolol, 5 mg, Intravenous, Q6H PRN  ondansetron, 4 mg, Intravenous, Q6H PRN        Discharge Plan:    D    Network Utilization Review Department  ATTENTION: Please call with any questions or concerns to [phone] and carefully listen to the prompts so that you are directed to the right person  All voicemails are confidential   Avtar Mehdi all requests for admission clinical reviews, approved or denied determinations and any other requests to dedicated fax number below belonging to the campus where the patient is receiving treatment   List of dedicated fax numbers for the Facilities:  [address] DENIALS (Administrative/Medical Necessity) [phone]   [address] (Maternity/NICU/Pediatrics) 261 [facility],[address] Floor [address] [phone] [address] [place] [address] Ameena Cardona 1481 [phone] [place] [address] [phone]

## 2021-06-29 NOTE — CASE MANAGEMENT
Not medically ready for dc at this time-  HR is not controlled- Cardiology is following  Spoke to patient reviewed DC IMM  Discussed transportation home and family is available  Discussed dc plan is home-   Pt in agreement for post PCP appointment and requesting an AM appointment  Appointment will be made closer to dc

## 2021-06-29 NOTE — ASSESSMENT & PLAN NOTE
· Based on clinical symptoms (inside, tremor, weight loss, hot intolerance, palpitation) with elevated free T4, low TSH level  · It was discussed with on-call Endocrinology on 06/28 previous provider methimazole was increased to 10 mg daily she will continue that and repeat a free T4 and a TSH in 4 weeks  · Patient does have positive thyroid receptor antibodies with suspicion of patient having Graves disease, ultrasound of the thyroid was negative for any nodules or anything to biopsy positive for prominent thyroid, a thyroid-stimulating immunoglobulin is pending

## 2021-06-29 NOTE — ASSESSMENT & PLAN NOTE
· Has been present left greater than right prior to admission  She was never on anticoagulation prior to admission  Will do a venous duplex to rule out a clot  The 2D echo done preserved ejection fraction of 50% she does have pulmonary hypertension diastolic was not able to be calculated RV is normal   Will give a dose of Lasix 40 mg IV x1 and dose of KCl 20 mEq p o  X1   · Elevate the legs  · Will place on compression stockings after ruling out DVT

## 2021-06-29 NOTE — PLAN OF CARE
Problem: Potential for Falls  Goal: Patient will remain free of falls  Description: INTERVENTIONS:  - Educate patient/family on patient safety including physical limitations  - Instruct patient to call for assistance with activity   - Consult OT/PT to assist with strengthening/mobility   - Keep Call bell within reach  - Keep bed low and locked with side rails adjusted as appropriate  - Keep care items and personal belongings within reach  - Initiate and maintain comfort rounds  - Make Fall Risk Sign visible to staff  - Apply yellow socks and bracelet for high fall risk patients  - Consider moving patient to room near nurses station  Outcome: Progressing     Problem: MOBILITY - ADULT  Goal: Maintain or return to baseline ADL function  Description: INTERVENTIONS:  -  Assess patient's ability to carry out ADLs; assess patient's baseline for ADL function and identify physical deficits which impact ability to perform ADLs (bathing, care of mouth/teeth, toileting, grooming, dressing, etc )  - Assess/evaluate cause of self-care deficits   - Assess range of motion  - Assess patient's mobility; develop plan if impaired  - Assess patient's need for assistive devices and provide as appropriate  - Encourage maximum independence but intervene and supervise when necessary  - Involve family in performance of ADLs  - Assess for home care needs following discharge   - Consider OT consult to assist with ADL evaluation and planning for discharge  - Provide patient education as appropriate  Outcome: Progressing  Goal: Maintains/Returns to pre admission functional level  Description: INTERVENTIONS:  - Perform BMAT or MOVE assessment daily    - Set and communicate daily mobility goal to care team and patient/family/caregiver     - Collaborate with rehabilitation services on mobility goals if consulted  - Record patient progress and toleration of activity level   Outcome: Progressing     Problem: CARDIOVASCULAR - ADULT  Goal: Maintains optimal cardiac output and hemodynamic stability  Description: INTERVENTIONS:  - Monitor I/O, vital signs and rhythm  - Monitor for S/S and trends of decreased cardiac output  - Administer and titrate ordered vasoactive medications to optimize hemodynamic stability  - Assess quality of pulses, skin color and temperature  - Assess for signs of decreased coronary artery perfusion  - Instruct patient to report change in severity of symptoms  Outcome: Progressing  Goal: Absence of cardiac dysrhythmias or at baseline rhythm  Description: INTERVENTIONS:  - Continuous cardiac monitoring, vital signs, obtain 12 lead EKG if ordered  - Administer antiarrhythmic and heart rate control medications as ordered  - Monitor electrolytes and administer replacement therapy as ordered  Outcome: Progressing     Problem: INFECTION - ADULT  Goal: Absence or prevention of progression during hospitalization  Description: INTERVENTIONS:  - Assess and monitor for signs and symptoms of infection  - Monitor lab/diagnostic results  - Monitor all insertion sites, i e  indwelling lines, tubes, and drains  - Monitor endotracheal if appropriate and nasal secretions for changes in amount and color  - [place] appropriate cooling/warming therapies per order  - Administer medications as ordered  - Instruct and encourage patient and family to use good hand hygiene technique  - Identify and instruct in appropriate isolation precautions for identified infection/condition  Outcome: Progressing     Problem: DISCHARGE PLANNING  Goal: Discharge to home or other facility with appropriate resources  Description: INTERVENTIONS:  - Identify barriers to discharge w/patient and caregiver  - Arrange for needed discharge resources and transportation as appropriate  - Identify discharge learning needs (meds, wound care, etc )  - Arrange for interpretive services to assist at discharge as needed  - Refer to Case Management Department for coordinating discharge planning if the patient needs post-hospital services based on physician/advanced practitioner order or complex needs related to functional status, cognitive ability, or social support system  Outcome: Progressing     Problem: Knowledge Deficit  Goal: Patient/family/caregiver demonstrates understanding of disease process, treatment plan, medications, and discharge instructions  Description: Complete learning assessment and assess knowledge base    Interventions:  - Provide teaching at level of understanding  - Provide teaching via preferred learning methods  Outcome: Progressing

## 2021-06-29 NOTE — PLAN OF CARE
Problem: Potential for Falls  Goal: Patient will remain free of falls  Description: INTERVENTIONS:  - Educate patient/family on patient safety including physical limitations  - Instruct patient to call for assistance with activity   - Consult OT/PT to assist with strengthening/mobility   - Keep Call bell within reach  - Keep bed low and locked with side rails adjusted as appropriate  - Keep care items and personal belongings within reach  - Initiate and maintain comfort rounds  - Make Fall Risk Sign visible to staff  - Offer Toileting every 2 Hours, in advance of need  - Initiate/Maintain bed alarm  - Obtain necessary fall risk management equipment: na  - Apply yellow socks and bracelet for high fall risk patients  - Consider moving patient to room near nurses station  6/29/2021 1125 by Jese Pena RN  Outcome: Progressing  6/29/2021 1124 by Jese Pena RN  Outcome: Progressing     Problem: CARDIOVASCULAR - ADULT  Goal: Maintains optimal cardiac output and hemodynamic stability  Description: INTERVENTIONS:  - Monitor I/O, vital signs and rhythm  - Monitor for S/S and trends of decreased cardiac output  - Administer and titrate ordered vasoactive medications to optimize hemodynamic stability  - Assess quality of pulses, skin color and temperature  - Assess for signs of decreased coronary artery perfusion  - Instruct patient to report change in severity of symptoms  6/29/2021 1125 by Jese Pena RN  Outcome: Progressing  6/29/2021 1124 by Jese Pena RN  Outcome: Progressing  Goal: Absence of cardiac dysrhythmias or at baseline rhythm  Description: INTERVENTIONS:  - Continuous cardiac monitoring, vital signs, obtain 12 lead EKG if ordered  - Administer antiarrhythmic and heart rate control medications as ordered  - Monitor electrolytes and administer replacement therapy as ordered  6/29/2021 1125 by Jese Pena RN  Outcome: Progressing  6/29/2021 1124 by Jese Pena RN  Outcome: Progressing Problem: INFECTION - ADULT  Goal: Absence or prevention of progression during hospitalization  Description: INTERVENTIONS:  - Assess and monitor for signs and symptoms of infection  - Monitor lab/diagnostic results  - Monitor all insertion sites, i e  indwelling lines, tubes, and drains  - Monitor endotracheal if appropriate and nasal secretions for changes in amount and color  - [place] appropriate cooling/warming therapies per order  - Administer medications as ordered  - Instruct and encourage patient and family to use good hand hygiene technique  - Identify and instruct in appropriate isolation precautions for identified infection/condition  6/29/2021 1125 by Nina Isbell RN  Outcome: Progressing  6/29/2021 1124 by Nina Isbell RN  Outcome: Progressing     Problem: DISCHARGE PLANNING  Goal: Discharge to home or other facility with appropriate resources  Description: INTERVENTIONS:  - Identify barriers to discharge w/patient and caregiver  - Arrange for needed discharge resources and transportation as appropriate  - Identify discharge learning needs (meds, wound care, etc )  - Arrange for interpretive services to assist at discharge as needed  - Refer to Case Management Department for coordinating discharge planning if the patient needs post-hospital services based on physician/advanced practitioner order or complex needs related to functional status, cognitive ability, or social support system  6/29/2021 1125 by Nina Isbell RN  Outcome: Progressing  6/29/2021 1124 by Nina Isbell RN  Outcome: Progressing     Problem: Knowledge Deficit  Goal: Patient/family/caregiver demonstrates understanding of disease process, treatment plan, medications, and discharge instructions  Description: Complete learning assessment and assess knowledge base    Interventions:  - Provide teaching at level of understanding  - Provide teaching via preferred learning methods  6/29/2021 1125 by Nina Isbell RN  Outcome: Progressing  6/29/2021 1124 by Beverly Rene RN  Outcome: Progressing

## 2021-06-29 NOTE — PROGRESS NOTES
[address]  Progress Note - Gonzalo 88 1955, 77 y o  female MRN: [id number]  Unit/Bed#: -01 Encounter: [id number]  Primary Care Provider: Rigo Marcus DO   Date and time admitted to hospital: 6/22/2021 10:15 AM    Bilateral lower extremity edema  Assessment & Plan  · Has been present left greater than right prior to admission  She was never on anticoagulation prior to admission  Will do a venous duplex to rule out a clot  The 2D echo done preserved ejection fraction of 50% she does have pulmonary hypertension diastolic was not able to be calculated RV is normal   Will give a dose of Lasix 40 mg IV x1 and dose of KCl 20 mEq p o  X1   · Elevate the legs  · Will place on compression stockings after ruling out DVT      Tobacco abuse  Assessment & Plan  Patient is cutting it down  Continue to encourage    KAELYN (acute kidney injury) ([facility] )  Assessment & Plan  · KAELYN on CKD stage 3 patient's baseline creatinine is 1 2 has resolved, monitor closely while on diuresis    Hyperthyroidism  Assessment & Plan  · Based on clinical symptoms (inside, tremor, weight loss, hot intolerance, palpitation) with elevated free T4, low TSH level  · It was discussed with on-call Endocrinology on 06/28 previous provider methimazole was increased to 10 mg daily she will continue that and repeat a free T4 and a TSH in 4 weeks  · Patient does have positive thyroid receptor antibodies with suspicion of patient having Graves disease, ultrasound of the thyroid was negative for any nodules or anything to biopsy positive for prominent thyroid, a thyroid-stimulating immunoglobulin is pending    * New onset a-fib ([facility] )  Assessment & Plan  · Rate still uncontrolled up to 146(new onset atrial fibrillation present on admission)- patient unable to be cardioverted till she has better control of her thyroid  · [id number]=3-ax Xarelto 20 mg daily  · Limited echo shows ejection fraction 50% mild to moderate TR and MR  · Patient also has been suffering with hyperthyroidism based on clinical symptoms and lab findings-most likely the cause of Afib  · Discussed with Cardiology titrate up Cardizem CD to 180 mf po bid- extra 60 mg given today  She is also on propranolol 120 mg p o  Q 6 hours  Will keep level to step-down  · Hyperthyroidism medication has been adjusted and up titrated  · Patient did receive a previous dose of digoxin her digit level is 1 1 on  patient is currently not on digoxin daily    Increased anion gap metabolic acidosis-resolved as of 2021  Assessment & Plan  Anion gap normalized  Could be secondary to KAELYN  Patient does have AFib, tachypneic elevated WBC-but no known source of infection, most likely secondary to hypothyroidism  Resolved            VTE Pharmacologic Prophylaxis: VTE Score: 3 Moderate Risk (Score 3-4) - Pharmacological DVT Prophylaxis Ordered: rivaroxaban (Xarelto)  Patient Centered Rounds: I performed bedside rounds with nursing staff today  Discussions with Specialists or Other Care Team Provider:  Cardiology    Education and Discussions with Family / Patient:  Patient    Time Spent for Care: 30 minutes  More than 50% of total time spent on counseling and coordination of care as described above  Current Length of Stay: 7 day(s)  Current Patient Status: Inpatient   Certification Statement: The patient will continue to require additional inpatient hospital stay due to afib with rvr  Discharge Plan: Anticipate discharge in 48 hrs to home      Code Status: Level 1 - Full Code    Subjective:   Patient seen and examined denies any chest pain intermittent palpitation or shortness of breath, legs are swollen    Objective:     Vitals:   Temp (24hrs), Av 3 °F (36 8 °C), Min:97 7 °F (36 5 °C), Max:98 7 °F (37 1 °C)    Temp:  [97 7 °F (36 5 °C)-98 7 °F (37 1 °C)] 98 7 °F (37 1 °C)  HR:  [103-140] 112  Resp:  [24-52] 24  BP: ()/(59-96) 141/87  SpO2:  [93 %-98 %] 98 %  Body mass index is 37 58 kg/m²  Input and Output Summary (last 24 hours): Intake/Output Summary (Last 24 hours) at 6/29/2021 1057  Last data filed at 6/29/2021 0901  Gross per 24 hour   Intake 720 ml   Output 875 ml   Net -155 ml       Physical Exam:   Physical Exam  Vitals and nursing note reviewed  Constitutional:       General: She is not in acute distress  Appearance: She is well-developed  HENT:      Head: Normocephalic and atraumatic  Eyes:      Conjunctiva/sclera: Conjunctivae normal    Cardiovascular:      Rate and Rhythm: Normal rate  Rhythm irregular  Heart sounds: No murmur heard  Pulmonary:      Effort: Pulmonary effort is normal  No respiratory distress  Breath sounds: Normal breath sounds  No wheezing or rales  Abdominal:      General: There is no distension  Palpations: Abdomen is soft  Tenderness: There is no abdominal tenderness  Musculoskeletal:         General: Swelling (Left lower extremity greater than right +2) present  Cervical back: Neck supple  Skin:     General: Skin is warm and dry  Neurological:      General: No focal deficit present  Mental Status: She is alert and oriented to person, place, and time  Mental status is at baseline            Additional Data:     Labs:  Results from last 7 days   Lab Units 06/27/21  0509   WBC Thousand/uL 9 70   HEMOGLOBIN g/dL 12 7   HEMATOCRIT % 39 2   PLATELETS Thousands/uL 192   NEUTROS PCT % 59   LYMPHS PCT % 30   MONOS PCT % 9   EOS PCT % 2     Results from last 7 days   Lab Units 06/27/21  0509   SODIUM mmol/L 136   POTASSIUM mmol/L 4 8   CHLORIDE mmol/L 106   CO2 mmol/L 20*   BUN mg/dL 27*   CREATININE mg/dL 1 10   ANION GAP mmol/L 10   CALCIUM mg/dL 9 2   ALBUMIN g/dL 2 6*   TOTAL BILIRUBIN mg/dL 0 53   ALK PHOS U/L 67   ALT U/L 10*   AST U/L 14   GLUCOSE RANDOM mg/dL 114                 Results from last 7 days   Lab Units 06/22/21  1128   LACTIC ACID mmol/L 1 8 Lines/Drains:  Invasive Devices     Peripheral Intravenous Line            Peripheral IV 06/29/21 Right;Ventral (anterior) Forearm <1 day                      Imaging: Reviewed radiology reports from this admission including: chest xray    Recent Cultures (last 7 days):         Last 24 Hours Medication List:   Current Facility-Administered Medications   Medication Dose Route Frequency Provider Last Rate    albuterol  2 puff Inhalation Q4H PRN Keshia Blue MD      ALPRAZolam  0 5 mg Oral TID PRN Keshia Blue MD      diltiazem  180 mg Oral BID [place], PA-C      docusate sodium  100 mg Oral BID Keshia Blue MD      fluticasone  2 puff Inhalation Q12H [address] Keshia Blue MD      magnesium oxide  400 mg Oral BID Keshia Blue MD      methimazole  10 mg Oral Daily Keshia Blue MD      metoprolol  5 mg Intravenous Q6H PRN Keshia Blue MD      nicotine  1 patch Transdermal Daily Aminta Lenz MD      ondansetron  4 mg Intravenous Q6H PRN Keshia Blue MD      propranolol  120 mg Oral Q6H [address] LINDA Day      rivaroxaban  20 mg Oral Daily With Breakfast Keshia Blue MD          Today, Patient Was Seen By: Peewee Contreras MD    **Please Note: This note may have been constructed using a voice recognition system  **

## 2021-06-30 ENCOUNTER — APPOINTMENT (INPATIENT)
Dept: NON INVASIVE DIAGNOSTICS | Facility: HOSPITAL | Age: 66
DRG: 644 | End: 2021-06-30
Payer: COMMERCIAL

## 2021-06-30 LAB
ANION GAP SERPL CALCULATED.3IONS-SCNC: 7 MMOL/L (ref 4–13)
BUN SERPL-MCNC: 23 MG/DL (ref 5–25)
CALCIUM SERPL-MCNC: 9.4 MG/DL (ref 8.3–10.1)
CHLORIDE SERPL-SCNC: 103 MMOL/L (ref 100–108)
CO2 SERPL-SCNC: 27 MMOL/L (ref 21–32)
CREAT SERPL-MCNC: 1.14 MG/DL (ref 0.6–1.3)
GFR SERPL CREATININE-BSD FRML MDRD: 50 ML/MIN/1.73SQ M
GLUCOSE SERPL-MCNC: 114 MG/DL (ref 65–140)
MAGNESIUM SERPL-MCNC: 1.7 MG/DL (ref 1.6–2.6)
POTASSIUM SERPL-SCNC: 4.2 MMOL/L (ref 3.5–5.3)
SODIUM SERPL-SCNC: 137 MMOL/L (ref 136–145)

## 2021-06-30 PROCEDURE — 83735 ASSAY OF MAGNESIUM: CPT | Performed by: FAMILY MEDICINE

## 2021-06-30 PROCEDURE — 99232 SBSQ HOSP IP/OBS MODERATE 35: CPT | Performed by: FAMILY MEDICINE

## 2021-06-30 PROCEDURE — 93970 EXTREMITY STUDY: CPT

## 2021-06-30 PROCEDURE — 80048 BASIC METABOLIC PNL TOTAL CA: CPT | Performed by: FAMILY MEDICINE

## 2021-06-30 RX ORDER — POTASSIUM CHLORIDE 20 MEQ/1
20 TABLET, EXTENDED RELEASE ORAL ONCE
Status: COMPLETED | OUTPATIENT
Start: 2021-06-30 | End: 2021-06-30

## 2021-06-30 RX ORDER — FUROSEMIDE 10 MG/ML
40 INJECTION INTRAMUSCULAR; INTRAVENOUS ONCE
Status: COMPLETED | OUTPATIENT
Start: 2021-06-30 | End: 2021-06-30

## 2021-06-30 RX ORDER — DIGOXIN 0.25 MG/ML
250 INJECTION INTRAMUSCULAR; INTRAVENOUS ONCE
Status: COMPLETED | OUTPATIENT
Start: 2021-06-30 | End: 2021-06-30

## 2021-06-30 RX ADMIN — FUROSEMIDE 40 MG: 10 INJECTION, SOLUTION INTRAMUSCULAR; INTRAVENOUS at 09:12

## 2021-06-30 RX ADMIN — DOCUSATE SODIUM 100 MG: 100 CAPSULE, LIQUID FILLED ORAL at 17:27

## 2021-06-30 RX ADMIN — PROPRANOLOL HYDROCHLORIDE 120 MG: 20 TABLET ORAL at 12:10

## 2021-06-30 RX ADMIN — ALPRAZOLAM 0.5 MG: 0.5 TABLET ORAL at 20:01

## 2021-06-30 RX ADMIN — PROPRANOLOL HYDROCHLORIDE 120 MG: 20 TABLET ORAL at 17:26

## 2021-06-30 RX ADMIN — FLUTICASONE PROPIONATE 2 PUFF: 110 AEROSOL, METERED RESPIRATORY (INHALATION) at 20:03

## 2021-06-30 RX ADMIN — POTASSIUM CHLORIDE 20 MEQ: 1500 TABLET, EXTENDED RELEASE ORAL at 09:13

## 2021-06-30 RX ADMIN — DILTIAZEM HYDROCHLORIDE 180 MG: 180 CAPSULE, COATED, EXTENDED RELEASE ORAL at 09:13

## 2021-06-30 RX ADMIN — FLUTICASONE PROPIONATE 2 PUFF: 110 AEROSOL, METERED RESPIRATORY (INHALATION) at 09:12

## 2021-06-30 RX ADMIN — Medication 400 MG: at 09:13

## 2021-06-30 RX ADMIN — METHIMAZOLE 10 MG: 5 TABLET ORAL at 09:14

## 2021-06-30 RX ADMIN — PROPRANOLOL HYDROCHLORIDE 120 MG: 20 TABLET ORAL at 05:06

## 2021-06-30 RX ADMIN — Medication 400 MG: at 17:27

## 2021-06-30 RX ADMIN — DIGOXIN 250 MCG: 0.25 INJECTION INTRAMUSCULAR; INTRAVENOUS at 09:13

## 2021-06-30 RX ADMIN — DILTIAZEM HYDROCHLORIDE 180 MG: 180 CAPSULE, COATED, EXTENDED RELEASE ORAL at 20:03

## 2021-06-30 RX ADMIN — DOCUSATE SODIUM 100 MG: 100 CAPSULE, LIQUID FILLED ORAL at 09:13

## 2021-06-30 RX ADMIN — RIVAROXABAN 20 MG: 20 TABLET, FILM COATED ORAL at 07:41

## 2021-06-30 RX ADMIN — Medication 1 PATCH: at 09:13

## 2021-06-30 NOTE — ASSESSMENT & PLAN NOTE
· Based on clinical symptoms (inside, tremor, weight loss, hot intolerance, palpitation) with elevated free T4, low TSH level  · It was discussed with on-call Endocrinology on 06/28 previous provider methimazole was increased to 10 mg daily she will continue that and repeat a free T4 and a TSH in 4 weeks  · Patient does have positive thyroid receptor antibodies with suspicion of patient having Graves disease, ultrasound of the thyroid was negative for any nodules or anything to biopsy positive for prominent thyroid, a thyroid-stimulating immunoglobulin is positive as well she will need to follow-up with endocrinology as an outpatient

## 2021-06-30 NOTE — ASSESSMENT & PLAN NOTE
· Has been present left greater than right prior to admission  She was never on anticoagulation prior to admission  Will do a venous duplex to rule out a clot  The 2D echo done preserved ejection fraction of 50% she does have pulmonary hypertension diastolic was not able to be calculated RV is normal   Will give a dose of Lasix 40 mg IV x1 and Elevate the legs  · Will place on compression stockings after ruling out DVT  · Her weight is down edema has improved will give another dose today

## 2021-06-30 NOTE — PLAN OF CARE
Problem: CARDIOVASCULAR - ADULT  Goal: Maintains optimal cardiac output and hemodynamic stability  Description: INTERVENTIONS:  - Monitor I/O, vital signs and rhythm  - Monitor for S/S and trends of decreased cardiac output  - Administer and titrate ordered vasoactive medications to optimize hemodynamic stability  - Assess quality of pulses, skin color and temperature  - Assess for signs of decreased coronary artery perfusion  - Instruct patient to report change in severity of symptoms  Outcome: Progressing  Goal: Absence of cardiac dysrhythmias or at baseline rhythm  Description: INTERVENTIONS:  - Continuous cardiac monitoring, vital signs, obtain 12 lead EKG if ordered  - Administer antiarrhythmic and heart rate control medications as ordered  - Monitor electrolytes and administer replacement therapy as ordered  Outcome: Progressing     Problem: INFECTION - ADULT  Goal: Absence or prevention of progression during hospitalization  Description: INTERVENTIONS:  - Assess and monitor for signs and symptoms of infection  - Monitor lab/diagnostic results  - Monitor all insertion sites, i e  indwelling lines, tubes, and drains  - Monitor endotracheal if appropriate and nasal secretions for changes in amount and color  - [place] appropriate cooling/warming therapies per order  - Administer medications as ordered  - Instruct and encourage patient and family to use good hand hygiene technique  - Identify and instruct in appropriate isolation precautions for identified infection/condition  Outcome: Progressing     Problem: DISCHARGE PLANNING  Goal: Discharge to home or other facility with appropriate resources  Description: INTERVENTIONS:  - Identify barriers to discharge w/patient and caregiver  - Arrange for needed discharge resources and transportation as appropriate  - Identify discharge learning needs (meds, wound care, etc )  - Arrange for interpretive services to assist at discharge as needed  - Refer to Case Management Department for coordinating discharge planning if the patient needs post-hospital services based on physician/advanced practitioner order or complex needs related to functional status, cognitive ability, or social support system  Outcome: Progressing     Problem: Knowledge Deficit  Goal: Patient/family/caregiver demonstrates understanding of disease process, treatment plan, medications, and discharge instructions  Description: Complete learning assessment and assess knowledge base    Interventions:  - Provide teaching at level of understanding  - Provide teaching via preferred learning methods  Outcome: Progressing

## 2021-06-30 NOTE — PROGRESS NOTES
[address]  Progress Note - Daltonmarika 86 1955, 77 y o  female MRN: [id number]  Unit/Bed#: -01 Encounter: [id number]  Primary Care Provider: Edward Evans DO   Date and time admitted to hospital: 6/22/2021 10:15 AM    Bilateral lower extremity edema  Assessment & Plan  · Has been present left greater than right prior to admission  She was never on anticoagulation prior to admission  Will do a venous duplex to rule out a clot  The 2D echo done preserved ejection fraction of 50% she does have pulmonary hypertension diastolic was not able to be calculated RV is normal   Will give a dose of Lasix 40 mg IV x1 and Elevate the legs  · Will place on compression stockings after ruling out DVT  · Her weight is down edema has improved will give another dose today      Tobacco abuse  Assessment & Plan  Patient is cutting it down  Continue to encourage    KAELYN (acute kidney injury) ([facility] [address] )  Assessment & Plan  · KAELYN on CKD stage 3 patient's baseline creatinine is 1 2 has resolved, monitor closely while on diuresis    Hyperthyroidism  Assessment & Plan  · Based on clinical symptoms (inside, tremor, weight loss, hot intolerance, palpitation) with elevated free T4, low TSH level  · It was discussed with on-call Endocrinology on 06/28 previous provider methimazole was increased to 10 mg daily she will continue that and repeat a free T4 and a TSH in 4 weeks  · Patient does have positive thyroid receptor antibodies with suspicion of patient having Graves disease, ultrasound of the thyroid was negative for any nodules or anything to biopsy positive for prominent thyroid, a thyroid-stimulating immunoglobulin is positive as well she will need to follow-up with endocrinology as an outpatient     * New onset a-fib [facility])  Assessment & Plan  · Rate suboptimal but improving to 120 discussed with Cardiology they will give digoxin 250 mg IV x1 continue beta-blocker and Cardizem at current dose   (new onset atrial fibrillation present on admission)- patient unable to be cardioverted till she has better control of her thyroid  · [id number]=7-[id number] Xarelto 20 mg daily  · Limited echo shows ejection fraction 50% mild to moderate TR and MR  · Patient also has been suffering with hyperthyroidism based on clinical symptoms and lab findings-most likely the cause of Afib  · Keep level to step-down  · Hyperthyroidism medication has been adjusted and up titrated  · Patient did receive a previous dose of digoxin her digit level is 1 1 on  patient is currently not on digoxin daily recheck digoxin level tomorrow        VTE Pharmacologic Prophylaxis: VTE Score: 3 Moderate Risk (Score 3-4) - Pharmacological DVT Prophylaxis Ordered: rivaroxaban (Xarelto)  Patient Centered Rounds: I performed bedside rounds with nursing staff today  Discussions with Specialists or Other Care Team Provider:  I have discussed with Cardiology    Education and Discussions with Family / Patient:  Patient    Time Spent for Care: 30 minutes  More than 50% of total time spent on counseling and coordination of care as described above  Current Length of Stay: 8 day(s)  Current Patient Status: Inpatient   Certification Statement: The patient will continue to require additional inpatient hospital stay due to AFib RVR  Discharge Plan: Anticipate discharge in 48-72 hrs to home  Code Status: Level 1 - Full Code    Subjective:   Patient seen and examined she states that her leg edema is improved no chest pain or shortness of breath    Objective:     Vitals:   Temp (24hrs), Av 8 °F (36 6 °C), Min:97 5 °F (36 4 °C), Max:98 2 °F (36 8 °C)    Temp:  [97 5 °F (36 4 °C)-98 2 °F (36 8 °C)] 97 5 °F (36 4 °C)  HR:  [] 104  Resp:  [17-29] 21  BP: (104-137)/(57-82) 114/69  SpO2:  [94 %-97 %] 94 %  Body mass index is 37 22 kg/m²  Input and Output Summary (last 24 hours):      Intake/Output Summary (Last 24 hours) at 2021 [facility] 60 filed at 6/30/2021 0901  Gross per 24 hour   Intake 1020 ml   Output 1550 ml   Net -530 ml       Physical Exam:   Physical Exam  Vitals and nursing note reviewed  Constitutional:       General: She is not in acute distress  Appearance: She is well-developed  HENT:      Head: Normocephalic and atraumatic  Eyes:      Conjunctiva/sclera: Conjunctivae normal    Cardiovascular:      Rate and Rhythm: Normal rate  Rhythm irregular  Heart sounds: No murmur heard  Pulmonary:      Effort: Pulmonary effort is normal  No respiratory distress  Breath sounds: Normal breath sounds  No wheezing or rales  Abdominal:      Palpations: Abdomen is soft  Tenderness: There is no abdominal tenderness  Musculoskeletal:         General: Swelling (Left greater than right although improved from yesterday) present  Cervical back: Neck supple  Skin:     General: Skin is warm and dry  Neurological:      General: No focal deficit present  Mental Status: She is alert and oriented to person, place, and time  Mental status is at baseline            Additional Data:     Labs:  Results from last 7 days   Lab Units 06/27/21  0509   WBC Thousand/uL 9 70   HEMOGLOBIN g/dL 12 7   HEMATOCRIT % 39 2   PLATELETS Thousands/uL 192   NEUTROS PCT % 59   LYMPHS PCT % 30   MONOS PCT % 9   EOS PCT % 2     Results from last 7 days   Lab Units 06/30/21  0501 06/27/21  0509   SODIUM mmol/L 137 136   POTASSIUM mmol/L 4 2 4 8   CHLORIDE mmol/L 103 106   CO2 mmol/L 27 20*   BUN mg/dL 23 27*   CREATININE mg/dL 1 14 1 10   ANION GAP mmol/L 7 10   CALCIUM mg/dL 9 4 9 2   ALBUMIN g/dL  --  2 6*   TOTAL BILIRUBIN mg/dL  --  0 53   ALK PHOS U/L  --  67   ALT U/L  --  10*   AST U/L  --  14   GLUCOSE RANDOM mg/dL 114 114                       Lines/Drains:  Invasive Devices     Peripheral Intravenous Line            Peripheral IV 06/29/21 Right;Ventral (anterior) Forearm 1 day                      Imaging: Reviewed radiology reports from this admission including: chest xray    Recent Cultures (last 7 days):         Last 24 Hours Medication List:   Current Facility-Administered Medications   Medication Dose Route Frequency Provider Last Rate    albuterol  2 puff Inhalation Q4H PRN Robin Osullivan MD      ALPRAZolam  0 5 mg Oral TID PRN Robin Osullivan MD      diltiazem  180 mg Oral BID [place], PA-C      docusate sodium  100 mg Oral BID Robin Osullivan MD      fluticasone  2 puff Inhalation Q12H [address] Robin Osullivan MD      magnesium oxide  400 mg Oral BID Robin Osullivan MD      methimazole  10 mg Oral Daily Robin Osullivan MD      metoprolol  5 mg Intravenous Q6H PRN Robin Osullivan MD      nicotine  1 patch Transdermal Daily Aminta Lenz MD      ondansetron  4 mg Intravenous Q6H PRN Robin Osullivan MD      propranolol  120 mg Oral Q6H [address] Glorine Delay, CRNP      rivaroxaban  20 mg Oral Daily With Breakfast Robin Osullivan MD          Today, Patient Was Seen By: Luis E Posey MD    **Please Note: This note may have been constructed using a voice recognition system  **

## 2021-06-30 NOTE — ASSESSMENT & PLAN NOTE
· Rate suboptimal but improving to 120 discussed with Cardiology they will give digoxin 250 mg IV x1 continue beta-blocker and Cardizem at current dose    (new onset atrial fibrillation present on admission)- patient unable to be cardioverted till she has better control of her thyroid  · [id number]=4-xp Xarelto 20 mg daily  · Limited echo shows ejection fraction 50% mild to moderate TR and MR  · Patient also has been suffering with hyperthyroidism based on clinical symptoms and lab findings-most likely the cause of Afib  · Keep level to step-down  · Hyperthyroidism medication has been adjusted and up titrated  · Patient did receive a previous dose of digoxin her digit level is 1 1 on 06/26 patient is currently not on digoxin daily recheck digoxin level tomorrow

## 2021-06-30 NOTE — PROGRESS NOTES
Progress Note - Cardiology   Patricia Santamaria 77 y o  female MRN: [id number]  Unit/Bed#: -01 Encounter: [id number]    Assessment:  New onset afib- rates better but still high, improved after increasing cardizem yesterday   Hyperthyroidism- on methimazole, now up-titrating dose   KAELYN- resolved  Increase anion gap metabolic acidosis- resolved  Normal LVEF    Plan:  1  Add digoxin 250 mcg IV x 1 with dig level in AM  2  Continue propanolol at present dose  3  Continue anticoagulation  4  Once thyroid better controlled if patient doesn't convert on her own will need to set up for cardioversion      Subjective/Objective     Subjective: pt reports feeling much better  Has no medical complaints  Wants to go home    Objective: sitting comfortably in hospital chair   HR on tele 110-115    Vitals: /79 (BP Location: Left arm)   Pulse (!) 125   Temp 97 8 °F (36 6 °C) (Oral)   Resp (!) 23   Ht 5' 8" (1 727 m)   Wt 111 kg (244 lb 12 8 oz)   SpO2 97%   BMI 37 22 kg/m²   Vitals:    06/29/21 0600 06/30/21 0429   Weight: 112 kg (247 lb 2 2 oz) 111 kg (244 lb 12 8 oz)     Orthostatic Blood Pressures      Most Recent Value   Blood Pressure  119/79 filed at 06/30/2021 2700   Patient Position - Orthostatic VS  Sitting filed at 06/30/2021 0739            Intake/Output Summary (Last 24 hours) at 6/30/2021 8130  Last data filed at 6/30/2021 0901  Gross per 24 hour   Intake 1380 ml   Output 2400 ml   Net -1020 ml       Invasive Devices     Peripheral Intravenous Line            Peripheral IV 06/29/21 Right;Ventral (anterior) Forearm 1 day                    Physical Exam: /79 (BP Location: Left arm)   Pulse (!) 125   Temp 97 8 °F (36 6 °C) (Oral)   Resp (!) 23   Ht 5' 8" (1 727 m)   Wt 111 kg (244 lb 12 8 oz)   SpO2 97%   BMI 37 22 kg/m²   General appearance: alert and oriented, in no acute distress  Neck: no JVD and supple, symmetrical, trachea midline  Lungs: clear to auscultation bilaterally  Heart: irregularly irregular  Skin: Skin color, texture, turgor normal  No rashes or lesions  Neurologic: Grossly normal   Extremities: 1+ pitting edema bilaterally    Lab Results:   I have personally reviewed pertinent lab results  CBC with diff:   Results from last 7 days   Lab Units 06/27/21  0509   WBC Thousand/uL 9 70   RBC Million/uL 4 24   HEMOGLOBIN g/dL 12 7   HEMATOCRIT % 39 2   MCV fL 93   MCH pg 30 0   MCHC g/dL 32 4   RDW % 13 2   MPV fL 10 7   PLATELETS Thousands/uL 192     CMP:   Results from last 7 days   Lab Units 06/30/21  0501 06/27/21  0509   SODIUM mmol/L 137 136   POTASSIUM mmol/L 4 2 4 8   CHLORIDE mmol/L 103 106   CO2 mmol/L 27 20*   BUN mg/dL 23 27*   CREATININE mg/dL 1 14 1 10   CALCIUM mg/dL 9 4 9 2   AST U/L  --  14   ALT U/L  --  10*   ALK PHOS U/L  --  67   EGFR ml/min/1 73sq m 50 52     Troponin:   0   Lab Value Date/Time    TROPONINI <0 02 06/22/2021 1043     BNP:   Results from last 7 days   Lab Units 06/30/21  0501   POTASSIUM mmol/L 4 2   CHLORIDE mmol/L 103   CO2 mmol/L 27   BUN mg/dL 23   CREATININE mg/dL 1 14   CALCIUM mg/dL 9 4   EGFR ml/min/1 73sq m 50     Coags:       TSH:     Magnesium:   Results from last 7 days   Lab Units 06/30/21  0501   MAGNESIUM mg/dL 1 7     Imaging: I have personally reviewed pertinent reports  Echo 6/28/21:  LEFT VENTRICLE:  Ejection fraction was estimated in the range of 50 %  RIGHT ATRIUM:  The atrium was mildly dilated  MITRAL VALVE:  There was mild to moderate regurgitation  TRICUSPID VALVE:  There was mild to moderate regurgitation  Estimated peak PA pressure was 55 mmHg    IVC, HEPATIC VEINS:  The inferior vena cava was dilated    EKG: telemetry revealing afib with ventricular rates 110-115 bpm

## 2021-07-01 LAB
ANION GAP SERPL CALCULATED.3IONS-SCNC: 4 MMOL/L (ref 4–13)
BUN SERPL-MCNC: 23 MG/DL (ref 5–25)
CALCIUM SERPL-MCNC: 9.2 MG/DL (ref 8.3–10.1)
CHLORIDE SERPL-SCNC: 102 MMOL/L (ref 100–108)
CO2 SERPL-SCNC: 32 MMOL/L (ref 21–32)
CREAT SERPL-MCNC: 1.33 MG/DL (ref 0.6–1.3)
DIGOXIN SERPL-MCNC: 0.8 NG/ML (ref 0.8–2)
GFR SERPL CREATININE-BSD FRML MDRD: 41 ML/MIN/1.73SQ M
GLUCOSE SERPL-MCNC: 102 MG/DL (ref 65–140)
MAGNESIUM SERPL-MCNC: 1.9 MG/DL (ref 1.6–2.6)
POTASSIUM SERPL-SCNC: 4.6 MMOL/L (ref 3.5–5.3)
SODIUM SERPL-SCNC: 138 MMOL/L (ref 136–145)

## 2021-07-01 PROCEDURE — 99232 SBSQ HOSP IP/OBS MODERATE 35: CPT | Performed by: FAMILY MEDICINE

## 2021-07-01 PROCEDURE — 83735 ASSAY OF MAGNESIUM: CPT | Performed by: FAMILY MEDICINE

## 2021-07-01 PROCEDURE — 80048 BASIC METABOLIC PNL TOTAL CA: CPT | Performed by: FAMILY MEDICINE

## 2021-07-01 PROCEDURE — 80162 ASSAY OF DIGOXIN TOTAL: CPT | Performed by: PHYSICIAN ASSISTANT

## 2021-07-01 RX ORDER — PROPRANOLOL HYDROCHLORIDE 20 MG/1
160 TABLET ORAL EVERY 6 HOURS SCHEDULED
Status: DISCONTINUED | OUTPATIENT
Start: 2021-07-01 | End: 2021-07-02 | Stop reason: HOSPADM

## 2021-07-01 RX ORDER — FUROSEMIDE 10 MG/ML
20 INJECTION INTRAMUSCULAR; INTRAVENOUS ONCE
Status: COMPLETED | OUTPATIENT
Start: 2021-07-01 | End: 2021-07-01

## 2021-07-01 RX ADMIN — ALPRAZOLAM 0.5 MG: 0.5 TABLET ORAL at 20:09

## 2021-07-01 RX ADMIN — DILTIAZEM HYDROCHLORIDE 180 MG: 180 CAPSULE, COATED, EXTENDED RELEASE ORAL at 20:09

## 2021-07-01 RX ADMIN — PROPRANOLOL HYDROCHLORIDE 120 MG: 20 TABLET ORAL at 00:32

## 2021-07-01 RX ADMIN — Medication 400 MG: at 08:06

## 2021-07-01 RX ADMIN — RIVAROXABAN 20 MG: 20 TABLET, FILM COATED ORAL at 08:06

## 2021-07-01 RX ADMIN — PROPRANOLOL HYDROCHLORIDE 160 MG: 20 TABLET ORAL at 23:33

## 2021-07-01 RX ADMIN — FUROSEMIDE 20 MG: 10 INJECTION, SOLUTION INTRAMUSCULAR; INTRAVENOUS at 10:00

## 2021-07-01 RX ADMIN — Medication 1 PATCH: at 08:07

## 2021-07-01 RX ADMIN — Medication 400 MG: at 17:04

## 2021-07-01 RX ADMIN — PROPRANOLOL HYDROCHLORIDE 160 MG: 20 TABLET ORAL at 17:04

## 2021-07-01 RX ADMIN — DILTIAZEM HYDROCHLORIDE 180 MG: 180 CAPSULE, COATED, EXTENDED RELEASE ORAL at 08:06

## 2021-07-01 RX ADMIN — PROPRANOLOL HYDROCHLORIDE 120 MG: 20 TABLET ORAL at 06:18

## 2021-07-01 RX ADMIN — DOCUSATE SODIUM 100 MG: 100 CAPSULE, LIQUID FILLED ORAL at 08:06

## 2021-07-01 RX ADMIN — METHIMAZOLE 10 MG: 5 TABLET ORAL at 08:07

## 2021-07-01 RX ADMIN — DOCUSATE SODIUM 100 MG: 100 CAPSULE, LIQUID FILLED ORAL at 17:03

## 2021-07-01 RX ADMIN — FLUTICASONE PROPIONATE 2 PUFF: 110 AEROSOL, METERED RESPIRATORY (INHALATION) at 20:10

## 2021-07-01 RX ADMIN — FLUTICASONE PROPIONATE 2 PUFF: 110 AEROSOL, METERED RESPIRATORY (INHALATION) at 08:07

## 2021-07-01 NOTE — ASSESSMENT & PLAN NOTE
· KAELYN on CKD stage 3 patient's baseline creatinine is 1 2 creatinine slightly up today to 1 3 she is still fluid overloaded in her legs but much better will give her a dose of Lasix IV but 20 mg IV x1 repeat a BMP tomorrow

## 2021-07-01 NOTE — PLAN OF CARE
Problem: Potential for Falls  Goal: Patient will remain free of falls  Description: INTERVENTIONS:  - Educate patient/family on patient safety including physical limitations  - Instruct patient to call for assistance with activity   - Consult OT/PT to assist with strengthening/mobility   - Keep Call bell within reach  - Keep bed low and locked with side rails adjusted as appropriate  - Keep care items and personal belongings within reach  - Initiate and maintain comfort rounds  - Make Fall Risk Sign visible to staff  - Offer Toileting every 2 Hours, in advance of need  - Initiate/Maintain bed alarm  - Obtain necessary fall risk management equipment:   - Apply yellow socks and bracelet for high fall risk patients  - Consider moving patient to room near nurses station  Outcome: Progressing     Problem: MOBILITY - ADULT  Goal: Maintain or return to baseline ADL function  Description: INTERVENTIONS:  -  Assess patient's ability to carry out ADLs; assess patient's baseline for ADL function and identify physical deficits which impact ability to perform ADLs (bathing, care of mouth/teeth, toileting, grooming, dressing, etc )  - Assess/evaluate cause of self-care deficits   - Assess range of motion  - Assess patient's mobility; develop plan if impaired  - Assess patient's need for assistive devices and provide as appropriate  - Encourage maximum independence but intervene and supervise when necessary  - Involve family in performance of ADLs  - Assess for home care needs following discharge   - Consider OT consult to assist with ADL evaluation and planning for discharge  - Provide patient education as appropriate  Outcome: Progressing     Problem: CARDIOVASCULAR - ADULT  Goal: Maintains optimal cardiac output and hemodynamic stability  Description: INTERVENTIONS:  - Monitor I/O, vital signs and rhythm  - Monitor for S/S and trends of decreased cardiac output  - Administer and titrate ordered vasoactive medications to optimize hemodynamic stability  - Assess quality of pulses, skin color and temperature  - Assess for signs of decreased coronary artery perfusion  - Instruct patient to report change in severity of symptoms  Outcome: Progressing  Goal: Absence of cardiac dysrhythmias or at baseline rhythm  Description: INTERVENTIONS:  - Continuous cardiac monitoring, vital signs, obtain 12 lead EKG if ordered  - Administer antiarrhythmic and heart rate control medications as ordered  - Monitor electrolytes and administer replacement therapy as ordered  Outcome: Progressing     Problem: INFECTION - ADULT  Goal: Absence or prevention of progression during hospitalization  Description: INTERVENTIONS:  - Assess and monitor for signs and symptoms of infection  - Monitor lab/diagnostic results  - Monitor all insertion sites, i e  indwelling lines, tubes, and drains  - Monitor endotracheal if appropriate and nasal secretions for changes in amount and color  - [place] appropriate cooling/warming therapies per order  - Administer medications as ordered  - Instruct and encourage patient and family to use good hand hygiene technique  - Identify and instruct in appropriate isolation precautions for identified infection/condition  Outcome: Progressing     Problem: DISCHARGE PLANNING  Goal: Discharge to home or other facility with appropriate resources  Description: INTERVENTIONS:  - Identify barriers to discharge w/patient and caregiver  - Arrange for needed discharge resources and transportation as appropriate  - Identify discharge learning needs (meds, wound care, etc )  - Arrange for interpretive services to assist at discharge as needed  - Refer to Case Management Department for coordinating discharge planning if the patient needs post-hospital services based on physician/advanced practitioner order or complex needs related to functional status, cognitive ability, or social support system  Outcome: Progressing     Problem: Knowledge Deficit  Goal: Patient/family/caregiver demonstrates understanding of disease process, treatment plan, medications, and discharge instructions  Description: Complete learning assessment and assess knowledge base  Interventions:  - Provide teaching at level of understanding  - Provide teaching via preferred learning methods  Outcome: Progressing     Problem: Nutrition/Hydration-ADULT  Goal: Nutrient/Hydration intake appropriate for improving, restoring or maintaining nutritional needs  Description: Monitor and assess patient's nutrition/hydration status for malnutrition  Collaborate with interdisciplinary team and initiate plan and interventions as ordered  Monitor patient's weight and dietary intake as ordered or per policy  Utilize nutrition screening tool and intervene as necessary  Determine patient's food preferences and provide high-protein, high-caloric foods as appropriate       INTERVENTIONS:  - Monitor oral intake, urinary output, labs, and treatment plans  - Assess nutrition and hydration status and recommend course of action  - Evaluate amount of meals eaten  - Assist patient with eating if necessary   - Allow adequate time for meals  - Recommend/ encourage appropriate diets, oral nutritional supplements, and vitamin/mineral supplements  - Order, calculate, and assess calorie counts as needed  - Recommend, monitor, and adjust tube feedings and TPN/PPN based on assessed needs  - Assess need for intravenous fluids  - Provide specific nutrition/hydration education as appropriate  - Include patient/family/caregiver in decisions related to nutrition  Outcome: Progressing

## 2021-07-01 NOTE — PROGRESS NOTES
[address]  Progress Note - Sherrye 86 1955, 77 y o  female MRN: [id number]  Unit/Bed#: -01 Encounter: [id number]  Primary Care Provider: Cayla Lopez DO   Date and time admitted to hospital: 6/22/2021 10:15 AM    Bilateral lower extremity edema  Assessment & Plan  · Has been present left greater than right prior to admission  She was never on anticoagulation prior to admission  Will do a venous duplex to rule out a clot  The 2D echo done preserved ejection fraction of 50% she does have pulmonary hypertension diastolic was not able to be calculated RV is normal     · I asked the nurse to weigh her again the edema is down especially on the left lower extremity but still overloaded  Secondary to creatinine went up slight at 1 3 will give her a lower dose of Lasix of 20 mg IV x1 and also ask the nurse to weigh her  And place compression stockings venous duplex is negative    Tobacco abuse  Assessment & Plan  Patient is cutting it down  Continue to encourage    KAELYN (acute kidney injury) ([facility] [address] )  Assessment & Plan  · KAELYN on CKD stage 3 patient's baseline creatinine is 1 2 creatinine slightly up today to 1 3 she is still fluid overloaded in her legs but much better will give her a dose of Lasix IV but 20 mg IV x1 repeat a BMP tomorrow      Hyperthyroidism  Assessment & Plan  · Based on clinical symptoms (inside, tremor, weight loss, hot intolerance, palpitation) with elevated free T4, low TSH level  · It was discussed with on-call Endocrinology on 06/28 previous provider methimazole was increased to 10 mg daily she will continue that and repeat a free T4 and a TSH in 4 weeks  · Patient does have positive thyroid receptor antibodies with suspicion of patient having Graves disease, ultrasound of the thyroid was negative for any nodules or anything to biopsy positive for prominent thyroid, a thyroid-stimulating immunoglobulin is positive as well she will need to follow-up with endocrinology as an outpatient     * New onset a-fib [facility])  Assessment & Plan  · Rate slowly improving digoxin level is therapeutic  Continue Cardizem 180 mg p o  B i d  Propranolol 120 mg q i d  As discussed with Cardiology still her thyroid is more controlled which will take weeks she is going to need to be on this regimen and monitor weekly as an outpatient  They will also start digoxin today  Will downgrade to med surge telemetry  (new onset atrial fibrillation present on admission)- patient unable to be cardioverted till she has better control of her thyroid  · [id number]=4-co Xarelto 20 mg daily  · Limited echo shows ejection fraction 50% mild to moderate TR and MR  · Patient also has been suffering with hyperthyroidism based on clinical symptoms and lab findings-most likely the cause of Afib  · Keep level to step-down  · Hyperthyroidism medication has been adjusted and up titrated            VTE Pharmacologic Prophylaxis: VTE Score: 3 Moderate Risk (Score 3-4) - Pharmacological DVT Prophylaxis Ordered: rivaroxaban (Xarelto)  Patient Centered Rounds: I performed bedside rounds with nursing staff today  Discussions with Specialists or Other Care Team Provider:  Cardiology    Education and Discussions with Family / Patient:  Patient    Time Spent for Care: 30 minutes  More than 50% of total time spent on counseling and coordination of care as described above  Current Length of Stay: 9 day(s)  Current Patient Status: Inpatient   Certification Statement: The patient will continue to require additional inpatient hospital stay due to Secondary to atrial fibrillation with RVR  Discharge Plan: Anticipate discharge in 24-48 hrs to home      Code Status: Level 1 - Full Code    Subjective:   Patient seen and examined denies any complaints chest pain or shortness of breath or palpitations    Objective:     Vitals:   Temp (24hrs), Av 8 °F (36 6 °C), Min:97 5 °F (36 4 °C), Max:98 °F (36 7 °C)    Temp:  [97 5 °F (36 4 °C)-98 °F (36 7 °C)] 98 °F (36 7 °C)  HR:  [] 111  Resp:  [18-35] 22  BP: ()/(58-79) 120/73  SpO2:  [94 %-98 %] 95 %  Body mass index is 37 22 kg/m²  Input and Output Summary (last 24 hours): Intake/Output Summary (Last 24 hours) at 7/1/2021 1016  Last data filed at 7/1/2021 0456  Gross per 24 hour   Intake 960 ml   Output 2950 ml   Net -1990 ml       Physical Exam:   Physical Exam  Vitals and nursing note reviewed  Constitutional:       General: She is not in acute distress  Appearance: She is well-developed  HENT:      Head: Normocephalic and atraumatic  Eyes:      Conjunctiva/sclera: Conjunctivae normal    Cardiovascular:      Rate and Rhythm: Normal rate  Rhythm irregular  Heart sounds: No murmur heard  Pulmonary:      Effort: Pulmonary effort is normal  No respiratory distress  Breath sounds: Normal breath sounds  No wheezing or rales  Abdominal:      General: There is no distension  Palpations: Abdomen is soft  Tenderness: There is no abdominal tenderness  Musculoskeletal:         General: Swelling (Right is trace left lower extremity is +1 more on the dorsal foot improved significantly from the previous 2 days) present  Cervical back: Neck supple  Skin:     General: Skin is warm and dry  Neurological:      Mental Status: She is alert            Additional Data:     Labs:  Results from last 7 days   Lab Units 06/27/21  0509   WBC Thousand/uL 9 70   HEMOGLOBIN g/dL 12 7   HEMATOCRIT % 39 2   PLATELETS Thousands/uL 192   NEUTROS PCT % 59   LYMPHS PCT % 30   MONOS PCT % 9   EOS PCT % 2     Results from last 7 days   Lab Units 07/01/21  0636 06/27/21  0509   SODIUM mmol/L 138 136   POTASSIUM mmol/L 4 6 4 8   CHLORIDE mmol/L 102 106   CO2 mmol/L 32 20*   BUN mg/dL 23 27*   CREATININE mg/dL 1 33* 1 10   ANION GAP mmol/L 4 10   CALCIUM mg/dL 9 2 9 2   ALBUMIN g/dL  --  2 6*   TOTAL BILIRUBIN mg/dL  --  0 53   ALK PHOS U/L --  67   ALT U/L  --  10*   AST U/L  --  14   GLUCOSE RANDOM mg/dL 102 114                       Lines/Drains:  Invasive Devices     Peripheral Intravenous Line            Peripheral IV 06/29/21 Right;Ventral (anterior) Forearm 2 days                  Telemetry:  Telemetry Orders (From admission, onward)             48 Hour Telemetry Monitoring  Continuous x 48 hours     Question:  Reason for 48 Hour Telemetry  Answer:  Arrhythmias Requiring Medical Therapy (eg  SVT, Vtach/fib, Bradycardia, Uncontrolled A-fib)                 Telemetry Reviewed: Atrial fibrillation  HR averaging 112  Indication for Continued Telemetry Use: Arrthymias requiring medical therapy           Imaging: Reviewed radiology reports from this admission including: chest xray    Recent Cultures (last 7 days):         Last 24 Hours Medication List:   Current Facility-Administered Medications   Medication Dose Route Frequency Provider Last Rate    albuterol  2 puff Inhalation Q4H PRN Sherrie Swann MD      ALPRAZolam  0 5 mg Oral TID PRN Sherrie Swann MD      diltiazem  180 mg Oral BID [place], PA-C      docusate sodium  100 mg Oral BID Sherrie Swann MD      fluticasone  2 puff Inhalation Q12H [facility] Sherrie Swann MD      furosemide  20 mg Intravenous Once Mikey Hardin MD      magnesium oxide  400 mg Oral BID Sherrie Swann MD      methimazole  10 mg Oral Daily Sherrie Swann MD      metoprolol  5 mg Intravenous Q6H PRN Sherrie Swann MD      nicotine  1 patch Transdermal Daily Aminta Lenz MD      ondansetron  4 mg Intravenous Q6H PRN Sherrie Swann MD      propranolol  120 mg Oral Q6H [facility] LINDA Grayson      rivaroxaban  20 mg Oral Daily With Breakfast Sherrie Swann MD          Today, Patient Was Seen By: Mikey Hardin MD    **Please Note: This note may have been constructed using a voice recognition system  **

## 2021-07-01 NOTE — PROGRESS NOTES
Progress Note - Cardiology   Diana Santamaria 77 y o  female MRN: [id number]  Unit/Bed#: -01 Encounter: [id number]    Assessment:  New onset afib- rates better after given dig yesterday  Hyperthyroidism- on methimazole, now up-titrating dose   KAELYN- resolved  Increase anion gap metabolic acidosis- resolved  Normal LVEF    Plan:  1  Will not start po dig, rates improving  2  Continue propanolol at present dose  Max dose is 640 mg daily  3  Continue anticoagulation  4  Once thyroid better controlled if patient doesn't convert on her own will need to set up for cardioversion  5  Will need PCP follow up at discharge in 2 weeks  Will need to lower cardizem at that appt most likely  6  Will get her in with our office in 4 weeks and will likely be able to stop cardizem and plan for a cardioversion without LENNIE given duration of anticoagulation       Subjective/Objective     Subjective: pt reports feeling much better  Has no medical complaints  Wants to go home by the weekend  Objective: sitting comfortably in hospital chair  HR on tele 105-110   Dig level this AM normal      Vitals: /73 (BP Location: Left arm)   Pulse (!) 111   Temp 98 °F (36 7 °C) (Oral)   Resp 22   Ht 5' 8" (1 727 m)   Wt 111 kg (244 lb 12 8 oz)   SpO2 95%   BMI 37 22 kg/m²   Vitals:    06/29/21 0600 06/30/21 0429   Weight: 112 kg (247 lb 2 2 oz) 111 kg (244 lb 12 8 oz)     Orthostatic Blood Pressures      Most Recent Value   Blood Pressure  120/73 filed at 07/01/2021 0730   Patient Position - Orthostatic VS  Sitting filed at 07/01/2021 0730            Intake/Output Summary (Last 24 hours) at 7/1/2021 0906  Last data filed at 7/1/2021 0456  Gross per 24 hour   Intake 960 ml   Output 2950 ml   Net -1990 ml       Invasive Devices     Peripheral Intravenous Line            Peripheral IV 06/29/21 Right;Ventral (anterior) Forearm 2 days                    Physical Exam: /73 (BP Location: Left arm)   Pulse (!) 111   Temp 98 °F (36 7 °C) (Oral)   Resp 22   Ht 5' 8" (1 727 m)   Wt 111 kg (244 lb 12 8 oz)   SpO2 95%   BMI 37 22 kg/m²   General appearance: alert and oriented, in no acute distress  Neck: no JVD and supple, symmetrical, trachea midline  Lungs: clear to auscultation bilaterally  Heart: irregularly irregular  Skin: Skin color, texture, turgor normal  No rashes or lesions  Neurologic: Grossly normal   Extremities: 1+ pitting edema bilaterally    Lab Results:   I have personally reviewed pertinent lab results  CBC with diff:   Results from last 7 days   Lab Units 06/27/21  0509   WBC Thousand/uL 9 70   RBC Million/uL 4 24   HEMOGLOBIN g/dL 12 7   HEMATOCRIT % 39 2   MCV fL 93   MCH pg 30 0   MCHC g/dL 32 4   RDW % 13 2   MPV fL 10 7   PLATELETS Thousands/uL 192     CMP:   Results from last 7 days   Lab Units 07/01/21  0636 06/27/21  0509   SODIUM mmol/L 138 136   POTASSIUM mmol/L 4 6 4 8   CHLORIDE mmol/L 102 106   CO2 mmol/L 32 20*   BUN mg/dL 23 27*   CREATININE mg/dL 1 33* 1 10   CALCIUM mg/dL 9 2 9 2   AST U/L  --  14   ALT U/L  --  10*   ALK PHOS U/L  --  67   EGFR ml/min/1 73sq m 41 52     Troponin:   0   Lab Value Date/Time    TROPONINI <0 02 06/22/2021 1043     BNP:   Results from last 7 days   Lab Units 07/01/21  0636   POTASSIUM mmol/L 4 6   CHLORIDE mmol/L 102   CO2 mmol/L 32   BUN mg/dL 23   CREATININE mg/dL 1 33*   CALCIUM mg/dL 9 2   EGFR ml/min/1 73sq m 41     Coags:       TSH:     Magnesium:   Results from last 7 days   Lab Units 07/01/21  0636   MAGNESIUM mg/dL 1 9     Imaging: I have personally reviewed pertinent reports  Echo 6/28/21:  LEFT VENTRICLE:  Ejection fraction was estimated in the range of 50 %  RIGHT ATRIUM:  The atrium was mildly dilated  MITRAL VALVE:  There was mild to moderate regurgitation  TRICUSPID VALVE:  There was mild to moderate regurgitation  Estimated peak PA pressure was 55 mmHg    IVC, HEPATIC VEINS:  The inferior vena cava was dilated    EKG: telemetry revealing afib with ventricular rates 105-110 bpm

## 2021-07-01 NOTE — ASSESSMENT & PLAN NOTE
· Rate slowly improving digoxin level is therapeutic  Continue Cardizem 180 mg p o  B i d  Propranolol 120 mg q i d  As discussed with Cardiology still her thyroid is more controlled which will take weeks she is going to need to be on this regimen and monitor weekly as an outpatient  They will also start digoxin today    Will downgrade to med surge telemetry  (new onset atrial fibrillation present on admission)- patient unable to be cardioverted till she has better control of her thyroid  · [id number]=1-ua Xarelto 20 mg daily  · Limited echo shows ejection fraction 50% mild to moderate TR and MR  · Patient also has been suffering with hyperthyroidism based on clinical symptoms and lab findings-most likely the cause of Afib  · Keep level to step-down  · Hyperthyroidism medication has been adjusted and up titrated

## 2021-07-01 NOTE — CASE MANAGEMENT
Discussed in rounds anticipate dc tomorrow  Pt will need post dc appointment  CM called for an appointment, however office was not able to accommodate  Pt is going to see Dr Dorothy Valdes in the [facility]  Information on AVS  DC IMM was reviewed and signed  Current dc plan is home, will continue to follow

## 2021-07-01 NOTE — ASSESSMENT & PLAN NOTE
· Has been present left greater than right prior to admission  She was never on anticoagulation prior to admission  Will do a venous duplex to rule out a clot  The 2D echo done preserved ejection fraction of 50% she does have pulmonary hypertension diastolic was not able to be calculated RV is normal     · I asked the nurse to weigh her again the edema is down especially on the left lower extremity but still overloaded  Secondary to creatinine went up slight at 1 3 will give her a lower dose of Lasix of 20 mg IV x1 and also ask the nurse to weigh her    And place compression stockings venous duplex is negative

## 2021-07-02 VITALS
WEIGHT: 242.51 LBS | SYSTOLIC BLOOD PRESSURE: 122 MMHG | RESPIRATION RATE: 19 BRPM | TEMPERATURE: 97.7 F | OXYGEN SATURATION: 96 % | HEART RATE: 94 BPM | HEIGHT: 68 IN | DIASTOLIC BLOOD PRESSURE: 73 MMHG | BODY MASS INDEX: 36.75 KG/M2

## 2021-07-02 LAB
ANION GAP SERPL CALCULATED.3IONS-SCNC: 8 MMOL/L (ref 4–13)
BUN SERPL-MCNC: 21 MG/DL (ref 5–25)
CALCIUM SERPL-MCNC: 9.3 MG/DL (ref 8.3–10.1)
CHLORIDE SERPL-SCNC: 100 MMOL/L (ref 100–108)
CO2 SERPL-SCNC: 29 MMOL/L (ref 21–32)
CREAT SERPL-MCNC: 1.38 MG/DL (ref 0.6–1.3)
GFR SERPL CREATININE-BSD FRML MDRD: 40 ML/MIN/1.73SQ M
GLUCOSE SERPL-MCNC: 109 MG/DL (ref 65–140)
POTASSIUM SERPL-SCNC: 4 MMOL/L (ref 3.5–5.3)
SODIUM SERPL-SCNC: 137 MMOL/L (ref 136–145)

## 2021-07-02 PROCEDURE — 99239 HOSP IP/OBS DSCHRG MGMT >30: CPT | Performed by: FAMILY MEDICINE

## 2021-07-02 PROCEDURE — 99232 SBSQ HOSP IP/OBS MODERATE 35: CPT | Performed by: FAMILY MEDICINE

## 2021-07-02 PROCEDURE — 80048 BASIC METABOLIC PNL TOTAL CA: CPT | Performed by: FAMILY MEDICINE

## 2021-07-02 RX ORDER — PROPRANOLOL HYDROCHLORIDE 80 MG/1
160 TABLET ORAL EVERY 6 HOURS SCHEDULED
Qty: 240 TABLET | Refills: 0 | Status: ON HOLD | OUTPATIENT
Start: 2021-07-02 | End: 2021-08-03 | Stop reason: CLARIF

## 2021-07-02 RX ORDER — DILTIAZEM HYDROCHLORIDE 180 MG/1
180 CAPSULE, COATED, EXTENDED RELEASE ORAL 2 TIMES DAILY
Qty: 60 CAPSULE | Refills: 0 | Status: ON HOLD | OUTPATIENT
Start: 2021-07-02 | End: 2021-08-03 | Stop reason: CLARIF

## 2021-07-02 RX ORDER — FUROSEMIDE 20 MG/1
20 TABLET ORAL DAILY
Status: DISCONTINUED | OUTPATIENT
Start: 2021-07-02 | End: 2021-07-02 | Stop reason: HOSPADM

## 2021-07-02 RX ORDER — FUROSEMIDE 20 MG/1
20 TABLET ORAL DAILY
Qty: 30 TABLET | Refills: 0 | Status: ON HOLD | OUTPATIENT
Start: 2021-07-03 | End: 2021-08-07 | Stop reason: SDUPTHER

## 2021-07-02 RX ORDER — METHIMAZOLE 10 MG/1
10 TABLET ORAL DAILY
Qty: 30 TABLET | Refills: 0 | Status: SHIPPED | OUTPATIENT
Start: 2021-07-03 | End: 2022-05-06 | Stop reason: SDUPTHER

## 2021-07-02 RX ADMIN — FUROSEMIDE 20 MG: 20 TABLET ORAL at 10:03

## 2021-07-02 RX ADMIN — DILTIAZEM HYDROCHLORIDE 180 MG: 180 CAPSULE, COATED, EXTENDED RELEASE ORAL at 08:33

## 2021-07-02 RX ADMIN — PROPRANOLOL HYDROCHLORIDE 160 MG: 20 TABLET ORAL at 05:54

## 2021-07-02 RX ADMIN — RIVAROXABAN 20 MG: 20 TABLET, FILM COATED ORAL at 08:34

## 2021-07-02 RX ADMIN — Medication 400 MG: at 08:34

## 2021-07-02 RX ADMIN — FLUTICASONE PROPIONATE 2 PUFF: 110 AEROSOL, METERED RESPIRATORY (INHALATION) at 08:32

## 2021-07-02 RX ADMIN — Medication 1 PATCH: at 08:36

## 2021-07-02 RX ADMIN — DOCUSATE SODIUM 100 MG: 100 CAPSULE, LIQUID FILLED ORAL at 08:34

## 2021-07-02 RX ADMIN — METHIMAZOLE 10 MG: 5 TABLET ORAL at 08:34

## 2021-07-02 NOTE — DISCHARGE SUMMARY
[address]  Discharge- Gonzalo 88 1955, 77 y o  female MRN: [id number]  Unit/Bed#: -01 Encounter: [id number]  Primary Care Provider: Guille Brink DO   Date and time admitted to hospital: 6/22/2021 10:15 AM    Bilateral lower extremity edema  Assessment & Plan  · Has been present left greater than right prior to admission  She was never on anticoagulation prior to admission  Will do a venous duplex to rule out a clot  The 2D echo done preserved ejection fraction of 50% she does have pulmonary hypertension diastolic was not able to be calculated RV is normal     · Weight is down good urine output right Will extremity edema resolved left lower extremity edema is down but there is some still evidence of edema on the of foot will just place her on Lasix 20 mg daily she did receive diuresis for 2 days of IV   Monitor kidney function for now is steady venous duplex is negative    Tobacco abuse  Assessment & Plan  Patient is cutting it down  Continue to encourage    KAELYN (acute kidney injury) ([facility] [address] )  Assessment & Plan  · KAELYN on CKD stage 3 patient's baseline creatinine is 1 2 creatinine slightly up today to 1 3 staying steady right lower extremity resolved edema left lower extremity is still some evidence of edema especially on the foot will just place on Lasix 20 mg daily p o  and monitor her kidney functions discussed to elevate and also place compression stockings venous duplex is negative BMP Tuesday    Hyperthyroidism  Assessment & Plan  · Based on clinical symptoms (inside, tremor, weight loss, hot intolerance, palpitation) with elevated free T4, low TSH level  · It was discussed with on-call Endocrinology on 06/28 previous provider methimazole was increased to 10 mg daily she will continue that and repeat a free T4 and a TSH in 4 weeks prescription for TSH free T4 and free T3 has been given to be done in 3 weeks from now  · Patient does have positive thyroid receptor antibodies with suspicion of patient having Graves disease, ultrasound of the thyroid was negative for any nodules or anything to biopsy positive for prominent thyroid, a thyroid-stimulating immunoglobulin is positive as well she will need to follow-up with endocrinology as an outpatient referral has been made    * New onset a-fib [facility])  Assessment & Plan  · Rate slowly improving digoxin level is therapeutic  Continue Cardizem 180 mg p o  B i d  Propranolol adjusted by cardio on 7/1 to 160 mg p o  Q i d  with Cardiology still her thyroid is more controlled which will take weeks she is going to need to be on this regimen and monitor weekly as an outpatient  Will keep in the hospital till rate is more controlled  (new onset atrial fibrillation present on admission)- patient unable to be cardioverted till she has better control of her thyroid  · [id number]=3-fbf may continue her xarelto 15 mg daily according to GFR 40  · Limited echo shows ejection fraction 50% mild to moderate TR and MR  · Patient also has been suffering with hyperthyroidism based on clinical symptoms and lab findings-most likely the cause of Afib    · Hyperthyroidism medication has been adjusted and up titrated  · Discussed with Cardiology okay to discharge home on the current regimen he will follow her close okay to have her resting heart rate in 1 teens  · Patient is scheduled to follow-up with them next week            Medical Problems     Resolved Problems  Date Reviewed: 7/2/2021        Resolved    Increased anion gap metabolic acidosis 9/72/8259     Resolved by  Peewee Contreras MD              Discharging Physician / Practitioner: Peewee Contreras MD  PCP: Jessica Damon DO  Admission Date:   Admission Orders (From admission, onward)     Ordered        06/22/21 1338  Inpatient Admission  Once                   Discharge Date: 07/02/21    Consultations During Hospital Stay:  · Cardiology  · Endocrinology over the phone    Procedures Performed:   · None    Significant Findings / Test Results:   · Abnormal thyroid test revealing hyperthyroidism  · Venous duplex is negative  · Ultrasound of the thyroid- The thyroid is prominent  Correlation with thyroid function studies is advised      No nodule meets current ACR criteria for requiring biopsy or followup ultrasounds  · Chest x-ray is negative  · 2D echo- left ventricle is EF 50%  Tricuspid mild-to-moderate regurgitation mitral mild to moderate regurgitation  Incidental Findings:   · See above     Test Results Pending at Discharge (will require follow up): · None     Outpatient Tests Requested:  · BMP to be done on July 6  · Thyroid function test to be repeated in 3 weeks    Complications:  None    Reason for Admission:  Fatigued tired palpitations    Hospital Course: Karron Apley is a 77 y o  female patient who originally presented to the hospital on 6/22/2021 due to new onset atrial fibrillation with RVR also found to be a new onset hyperthyroidism she had positive antibodies suggesting Graves disease  Endocrinology was consulted over the phone who recommended to increase insulin is all from 5 initially that was started to 10 mg cardiology was consulted as well patient is negative eat cardioverted till she is more euthyroid as it can be very difficult  She is already on anticoagulation 15 mg of Xarelto secondary to GFR 40  On she has her regimen has been adjusted throughout the hospital stay and her heart rate continued to improve and they will remain slightly above but as Cardiology stated okay to have resting in 1 teens will be discharged on Cardizem 180 mg p o  B i d  and propranolol 160 mg 4 times a day  Will have a close follow-up with cardiology every week because once she is close to the thyroid her heart rate will drop significantly when she is euthyroid will proceed with cardioversion    She will have TSH and free T4 free T3 done in additional 3 weeks and BMP to be repeated on Tuesday check her renal function on low-dose diuretic as she had lower extremity edema which responded to couple of IV diuretic doses  Otherwise she is medically clear to be discharged as well  Please see above list of diagnoses and related plan for additional information  Condition at Discharge: stable    Discharge Day Visit / Exam:   * Please refer to separate progress note for these details *    Discussion with Family: patient    Discharge instructions/Information to patient and family:   See after visit summary for information provided to patient and family  Provisions for Follow-Up Care:  See after visit summary for information related to follow-up care and any pertinent home health orders  Disposition:   Home    Planned Readmission: no     Discharge Statement:  I spent >35 minutes discharging the patient  This time was spent on the day of discharge  I had direct contact with the patient on the day of discharge  Greater than 50% of the total time was spent examining patient, answering all patient questions, arranging and discussing plan of care with patient as well as directly providing post-discharge instructions  Additional time then spent on discharge activities  Discharge Medications:  See after visit summary for reconciled discharge medications provided to patient and/or family        **Please Note: This note may have been constructed using a voice recognition system**

## 2021-07-02 NOTE — NURSING NOTE
Pt for discharge to home  Instructions reviewed with pt/ verbalized understanding  Medications reviewed & smoking cessation also/pt verbalized understanding for all

## 2021-07-02 NOTE — ASSESSMENT & PLAN NOTE
· Based on clinical symptoms (inside, tremor, weight loss, hot intolerance, palpitation) with elevated free T4, low TSH level  · It was discussed with on-call Endocrinology on 06/28 previous provider methimazole was increased to 10 mg daily she will continue that and repeat a free T4 and a TSH in 4 weeks prescription for TSH free T4 and free T3 has been given to be done in 3 weeks from now  · Patient does have positive thyroid receptor antibodies with suspicion of patient having Graves disease, ultrasound of the thyroid was negative for any nodules or anything to biopsy positive for prominent thyroid, a thyroid-stimulating immunoglobulin is positive as well she will need to follow-up with endocrinology as an outpatient referral has been made

## 2021-07-02 NOTE — ASSESSMENT & PLAN NOTE
· KAELYN on CKD stage 3 patient's baseline creatinine is 1 2 creatinine slightly up today to 1 3 staying steady right lower extremity resolved edema left lower extremity is still some evidence of edema especially on the foot will just place on Lasix 20 mg daily p o  and monitor her kidney functions discussed to elevate and also place compression stockings venous duplex is negative

## 2021-07-02 NOTE — ASSESSMENT & PLAN NOTE
· Rate slowly improving digoxin level is therapeutic  Continue Cardizem 180 mg p o  B i d  Propranolol adjusted by cardio on 7/1 to 160 mg p o  Q i d  with Cardiology still her thyroid is more controlled which will take weeks she is going to need to be on this regimen and monitor weekly as an outpatient  Will keep in the hospital till rate is more controlled  (new onset atrial fibrillation present on admission)- patient unable to be cardioverted till she has better control of her thyroid  · [id number]=8-xrp may continue her xarelto 15 mg daily according to GFR 40  · Limited echo shows ejection fraction 50% mild to moderate TR and MR  · Patient also has been suffering with hyperthyroidism based on clinical symptoms and lab findings-most likely the cause of Afib    · Hyperthyroidism medication has been adjusted and up titrated  · Discussed with Cardiology okay to discharge home on the current regimen he will follow her close okay to have her resting heart rate in 1 teens  · Patient is scheduled to follow-up with them next week

## 2021-07-02 NOTE — PHYSICAL THERAPY NOTE
Physical Therapy Screen    Patient Name: Jim Dixon    Today's Date: 7/2/2021     Problem List  Principal Problem:    New onset a-fib [facility])  Active Problems:    Hyperthyroidism    KAELYN (acute kidney injury) ([facility] [address] )    Tobacco abuse    Bilateral lower extremity edema       Past Medical History  Past Medical History:   Diagnosis Date    [facility]     High cholesterol     Hypertension         Past Surgical History  Past Surgical History:   Procedure Laterality Date    APPENDECTOMY      CATARACT EXTRACTION Bilateral         07/02/21 0924   PT Last Visit   PT Visit Date 07/02/21   Note Type   Note type Screen       Received order for PT consult  Chart reviewed  Pt admitted with diagnosis new onset A-fib  Spoke with RN, Amrita Daly who reports patient is independent in room at this time  Spoke with patient  Patient reports she is at her PLOF of independent at this time  Reports no difficulty or SOB with mobility and has no concerns returning home when medically cleared  Reports understanding of initiating rest breaks should she become SOB with mobility  Pt's HR varied from 80s to 90s then increased to 110s at rest  Dr Deal Larger aware  Nursing to monitor HR with ambulation  Will D/C PT services at this time as patient is at her PLOF independent without acute care skilled PT services warranted  Should patient's status change, please re-consult       Gigi Yanez, PT,DPT

## 2021-07-02 NOTE — ASSESSMENT & PLAN NOTE
· Has been present left greater than right prior to admission  She was never on anticoagulation prior to admission  Will do a venous duplex to rule out a clot  The 2D echo done preserved ejection fraction of 50% she does have pulmonary hypertension diastolic was not able to be calculated RV is normal     · Weight is down good urine output right Will extremity edema resolved left lower extremity edema is down but there is some still evidence of edema on the of foot will just place her on Lasix 20 mg daily she did receive diuresis for 2 days of IV   Monitor kidney function for now is steady venous duplex is negative

## 2021-07-02 NOTE — ASSESSMENT & PLAN NOTE
· Rate slowly improving digoxin level is therapeutic  Continue Cardizem 180 mg p o  B i d  Propranolol adjusted by cardio on 7/1 to 160 mg p o  Q i d  with Cardiology still her thyroid is more controlled which will take weeks she is going to need to be on this regimen and monitor weekly as an outpatient      Will keep in the hospital till rate is more controlled  (new onset atrial fibrillation present on admission)- patient unable to be cardioverted till she has better control of her thyroid  · [id number] Xarelto 20 mg daily  · Limited echo shows ejection fraction 50% mild to moderate TR and MR  · Patient also has been suffering with hyperthyroidism based on clinical symptoms and lab findings-most likely the cause of Afib    · Hyperthyroidism medication has been adjusted and up titrated

## 2021-07-02 NOTE — ASSESSMENT & PLAN NOTE
· KAELYN on CKD stage 3 patient's baseline creatinine is 1 2 creatinine slightly up today to 1 3 staying steady right lower extremity resolved edema left lower extremity is still some evidence of edema especially on the foot will just place on Lasix 20 mg daily p o  and monitor her kidney functions discussed to elevate and also place compression stockings venous duplex is negative BMP Tuesday

## 2021-07-02 NOTE — OCCUPATIONAL THERAPY NOTE
Occupational Therapy Screen     Patient Name: Jonnie Green  Today's Date: 7/2/2021  Problem List  Principal Problem:    New onset a-fib [facility])  Active Problems:    Hyperthyroidism    KAELYN (acute kidney injury) ([facility] [address] )    Tobacco abuse    Bilateral lower extremity edema            07/02/21 0856   Note Type   Note type Screen       OT orders received  Chart review completed  Pt admitted to [facility] 6/22/2021 with Dx: new onset a-fib  Spoke with RN, Guille Huitron who reports Pt is completely independent in room and having no difficulty with ADLs or functional transfers  Per Pt she is having no concerns regarding therapy or safely returning home at this time  D/c OT effective 7/2/2021  If new concerns arise, please re-consult       ROMMEL Christian/NILAM

## 2021-07-02 NOTE — PROGRESS NOTES
[address]  Progress Note - [address] 1955, 77 y o  female MRN: [id number]  Unit/Bed#: -01 Encounter: [id number]  Primary Care Provider: Alfredo Viera DO   Date and time admitted to hospital: 6/22/2021 10:15 AM    Bilateral lower extremity edema  Assessment & Plan  · Has been present left greater than right prior to admission  She was never on anticoagulation prior to admission  Will do a venous duplex to rule out a clot  The 2D echo done preserved ejection fraction of 50% she does have pulmonary hypertension diastolic was not able to be calculated RV is normal     · Weight is down good urine output right Will extremity edema resolved left lower extremity edema is down but there is some still evidence of edema on the of foot will just place her on Lasix 20 mg daily she did receive diuresis for 2 days of IV   Monitor kidney function for now is steady venous duplex is negative    Tobacco abuse  Assessment & Plan  Patient is cutting it down  Continue to encourage    KAELYN (acute kidney injury) ([facility] [address] )  Assessment & Plan  · KAELYN on CKD stage 3 patient's baseline creatinine is 1 2 creatinine slightly up today to 1 3 staying steady right lower extremity resolved edema left lower extremity is still some evidence of edema especially on the foot will just place on Lasix 20 mg daily p o  and monitor her kidney functions discussed to elevate and also place compression stockings venous duplex is negative    Hyperthyroidism  Assessment & Plan  · Based on clinical symptoms (inside, tremor, weight loss, hot intolerance, palpitation) with elevated free T4, low TSH level  · It was discussed with on-call Endocrinology on 06/28 previous provider methimazole was increased to 10 mg daily she will continue that and repeat a free T4 and a TSH in 4 weeks  · Patient does have positive thyroid receptor antibodies with suspicion of patient having Graves disease, ultrasound of the thyroid was negative for any nodules or anything to biopsy positive for prominent thyroid, a thyroid-stimulating immunoglobulin is positive as well she will need to follow-up with endocrinology as an outpatient     * New onset a-fib [facility])  Assessment & Plan  · Rate slowly improving digoxin level is therapeutic  Continue Cardizem 180 mg p o  B i d  Propranolol adjusted by cardio on 7/1 to 160 mg p o  Q i d  with Cardiology still her thyroid is more controlled which will take weeks she is going to need to be on this regimen and monitor weekly as an outpatient  Will keep in the hospital till rate is more controlled  (new onset atrial fibrillation present on admission)- patient unable to be cardioverted till she has better control of her thyroid  · [id number] Xarelto 20 mg daily  · Limited echo shows ejection fraction 50% mild to moderate TR and MR  · Patient also has been suffering with hyperthyroidism based on clinical symptoms and lab findings-most likely the cause of Afib    · Hyperthyroidism medication has been adjusted and up titrated            VTE Pharmacologic Prophylaxis: VTE Score: 3 Moderate Risk (Score 3-4) - Pharmacological DVT Prophylaxis Ordered: rivaroxaban (Xarelto)  Patient Centered Rounds: I performed bedside rounds with nursing staff today  Discussions with Specialists or Other Care Team Provider: will discuss with cardio    Education and Discussions with Family / Patient: patient    Time Spent for Care: 30 minutes  More than 50% of total time spent on counseling and coordination of care as described above  Current Length of Stay: 10 day(s)  Current Patient Status: Inpatient   Certification Statement: The patient will continue to require additional inpatient hospital stay due to Uncontrolled AFib  Discharge Plan: Anticipate discharge in 48-72 hrs to home      Code Status: Level 1 - Full Code    Subjective:   Patient seen and examined she denies any chest pain or shortness of breath denies any palpitation denies any dizziness wants to go home    Objective:     Vitals:   Temp (24hrs), Av 1 °F (36 7 °C), Min:97 7 °F (36 5 °C), Max:98 6 °F (37 °C)    Temp:  [97 7 °F (36 5 °C)-98 6 °F (37 °C)] 97 7 °F (36 5 °C)  HR:  [] 94  Resp:  [17-28] 19  BP: ()/(61-91) 122/73  SpO2:  [91 %-97 %] 96 %  Body mass index is 36 87 kg/m²  Input and Output Summary (last 24 hours): Intake/Output Summary (Last 24 hours) at 2021 0923  Last data filed at 2021 0800  Gross per 24 hour   Intake 780 ml   Output 2300 ml   Net -1520 ml       Physical Exam:   Physical Exam  Vitals and nursing note reviewed  Constitutional:       General: She is not in acute distress  Appearance: She is well-developed  HENT:      Head: Normocephalic and atraumatic  Eyes:      Conjunctiva/sclera: Conjunctivae normal    Cardiovascular:      Rate and Rhythm: Normal rate  Rhythm irregular  Heart sounds: No murmur heard  Pulmonary:      Effort: Pulmonary effort is normal  No respiratory distress  Breath sounds: Normal breath sounds  Abdominal:      General: There is no distension  Palpations: Abdomen is soft  Tenderness: There is no abdominal tenderness  Musculoskeletal:         General: Swelling (Right foot dorsal) present  Cervical back: Neck supple  Skin:     General: Skin is warm and dry  Neurological:      General: No focal deficit present  Mental Status: She is alert and oriented to person, place, and time  Mental status is at baseline     Psychiatric:         Mood and Affect: Mood normal          Additional Data:     Labs:  Results from last 7 days   Lab Units 21  0509   WBC Thousand/uL 9 70   HEMOGLOBIN g/dL 12 7   HEMATOCRIT % 39 2   PLATELETS Thousands/uL 192   NEUTROS PCT % 59   LYMPHS PCT % 30   MONOS PCT % 9   EOS PCT % 2     Results from last 7 days   Lab Units 21  0559 21  0509   SODIUM mmol/L 137 136   POTASSIUM mmol/L 4 0 4 8   CHLORIDE mmol/L 100 106   CO2 mmol/L 29 20*   BUN mg/dL 21 27*   CREATININE mg/dL 1 38* 1 10   ANION GAP mmol/L 8 10   CALCIUM mg/dL 9 3 9 2   ALBUMIN g/dL  --  2 6*   TOTAL BILIRUBIN mg/dL  --  0 53   ALK PHOS U/L  --  67   ALT U/L  --  10*   AST U/L  --  14   GLUCOSE RANDOM mg/dL 109 114                       Lines/Drains:  Invasive Devices     Peripheral Intravenous Line            Peripheral IV 06/29/21 Right;Ventral (anterior) Forearm 3 days                  Telemetry:  Telemetry Orders (From admission, onward)             48 Hour Telemetry Monitoring  Continuous x 48 hours     Question:  Reason for 48 Hour Telemetry  Answer:  Arrhythmias Requiring Medical Therapy (eg  SVT, Vtach/fib, Bradycardia, Uncontrolled A-fib)                 Telemetry Reviewed: Atrial fibrillation   HR averaging 120  Indication for Continued Telemetry Use: Arrthymias requiring medical therapy           Imaging: Reviewed radiology reports from this admission including: chest xray    Recent Cultures (last 7 days):         Last 24 Hours Medication List:   Current Facility-Administered Medications   Medication Dose Route Frequency Provider Last Rate    albuterol  2 puff Inhalation Q4H PRN Gayr Lopez MD      ALPRAZolam  0 5 mg Oral TID PRN Gary Lopez MD      diltiazem  180 mg Oral BID [place], PA-C      docusate sodium  100 mg Oral BID Gary Lopez MD      fluticasone  2 puff Inhalation Q12H [address] Gary Lopez MD      furosemide  20 mg Oral Daily Eduard Wakefield MD      magnesium oxide  400 mg Oral BID Gary Lopez MD      methimazole  10 mg Oral Daily Gary Lopez MD      metoprolol  5 mg Intravenous Q6H PRN Gary Lopez MD      nicotine  1 patch Transdermal Daily Gary Lopez MD      ondansetron  4 mg Intravenous Q6H PRN Gary Lopez MD      propranolol  160 mg Oral Q6H Meenakshi Purdy MD      rivaroxaban  20 mg Oral Daily With Breakfast Gary Lopez MD Today, Patient Was Seen By: Dee Dee Ray MD    **Please Note: This note may have been constructed using a voice recognition system  **

## 2021-07-06 ENCOUNTER — APPOINTMENT (OUTPATIENT)
Dept: LAB | Facility: HOSPITAL | Age: 66
End: 2021-07-06
Attending: FAMILY MEDICINE
Payer: COMMERCIAL

## 2021-07-06 DIAGNOSIS — N17.9 AKI (ACUTE KIDNEY INJURY) (HCC): ICD-10-CM

## 2021-07-06 DIAGNOSIS — E05.90 HYPERTHYROIDISM: ICD-10-CM

## 2021-07-06 LAB
ANION GAP SERPL CALCULATED.3IONS-SCNC: 7 MMOL/L (ref 4–13)
BUN SERPL-MCNC: 28 MG/DL (ref 5–25)
CALCIUM SERPL-MCNC: 9.5 MG/DL (ref 8.3–10.1)
CHLORIDE SERPL-SCNC: 103 MMOL/L (ref 100–108)
CO2 SERPL-SCNC: 31 MMOL/L (ref 21–32)
CREAT SERPL-MCNC: 1.71 MG/DL (ref 0.6–1.3)
GFR SERPL CREATININE-BSD FRML MDRD: 31 ML/MIN/1.73SQ M
GLUCOSE P FAST SERPL-MCNC: 115 MG/DL (ref 65–99)
POTASSIUM SERPL-SCNC: 4 MMOL/L (ref 3.5–5.3)
SODIUM SERPL-SCNC: 141 MMOL/L (ref 136–145)
T3FREE SERPL-MCNC: 3.6 PG/ML (ref 2.3–4.2)
T4 FREE SERPL-MCNC: 2.01 NG/DL (ref 0.76–1.46)
TSH SERPL DL<=0.05 MIU/L-ACNC: <0.007 UIU/ML (ref 0.36–3.74)

## 2021-07-06 PROCEDURE — 84443 ASSAY THYROID STIM HORMONE: CPT

## 2021-07-06 PROCEDURE — 84439 ASSAY OF FREE THYROXINE: CPT

## 2021-07-06 PROCEDURE — 36415 COLL VENOUS BLD VENIPUNCTURE: CPT

## 2021-07-06 PROCEDURE — 93970 EXTREMITY STUDY: CPT | Performed by: SURGERY

## 2021-07-06 PROCEDURE — 80048 BASIC METABOLIC PNL TOTAL CA: CPT

## 2021-07-06 PROCEDURE — 84481 FREE ASSAY (FT-3): CPT

## 2021-08-03 ENCOUNTER — APPOINTMENT (EMERGENCY)
Dept: RADIOLOGY | Facility: HOSPITAL | Age: 66
DRG: 308 | End: 2021-08-03
Payer: COMMERCIAL

## 2021-08-03 ENCOUNTER — HOSPITAL ENCOUNTER (INPATIENT)
Facility: HOSPITAL | Age: 66
LOS: 4 days | Discharge: HOME HEALTH | DRG: 308 | End: 2021-08-07
Attending: EMERGENCY MEDICINE | Admitting: FAMILY MEDICINE
Payer: COMMERCIAL

## 2021-08-03 DIAGNOSIS — I48.91 ATRIAL FIBRILLATION WITH RVR (HCC): Primary | ICD-10-CM

## 2021-08-03 DIAGNOSIS — N17.9 AKI (ACUTE KIDNEY INJURY) (HCC): ICD-10-CM

## 2021-08-03 DIAGNOSIS — I50.20 HFREF (HEART FAILURE WITH REDUCED EJECTION FRACTION) (HCC): ICD-10-CM

## 2021-08-03 DIAGNOSIS — E87.6 HYPOKALEMIA: ICD-10-CM

## 2021-08-03 DIAGNOSIS — R60.0 BILATERAL LOWER EXTREMITY EDEMA: ICD-10-CM

## 2021-08-03 DIAGNOSIS — I50.31 ACUTE HEART FAILURE WITH PRESERVED EJECTION FRACTION (HCC): ICD-10-CM

## 2021-08-03 DIAGNOSIS — I50.33 ACUTE ON CHRONIC DIASTOLIC HEART FAILURE (HCC): ICD-10-CM

## 2021-08-03 LAB
ALBUMIN SERPL BCP-MCNC: 3.5 G/DL (ref 3.5–5)
ALP SERPL-CCNC: 100 U/L (ref 46–116)
ALT SERPL W P-5'-P-CCNC: 29 U/L (ref 12–78)
ANION GAP SERPL CALCULATED.3IONS-SCNC: 11 MMOL/L (ref 4–13)
AST SERPL W P-5'-P-CCNC: 22 U/L (ref 5–45)
BASOPHILS # BLD MANUAL: 0 THOUSAND/UL (ref 0–0.1)
BASOPHILS NFR MAR MANUAL: 0 % (ref 0–1)
BILIRUB SERPL-MCNC: 0.83 MG/DL (ref 0.2–1)
BUN SERPL-MCNC: 21 MG/DL (ref 5–25)
CALCIUM SERPL-MCNC: 9 MG/DL (ref 8.3–10.1)
CHLORIDE SERPL-SCNC: 107 MMOL/L (ref 100–108)
CO2 SERPL-SCNC: 28 MMOL/L (ref 21–32)
CREAT SERPL-MCNC: 1.54 MG/DL (ref 0.6–1.3)
EOSINOPHIL # BLD MANUAL: 0 THOUSAND/UL (ref 0–0.4)
EOSINOPHIL NFR BLD MANUAL: 0 % (ref 0–6)
ERYTHROCYTE [DISTWIDTH] IN BLOOD BY AUTOMATED COUNT: 14.7 % (ref 11.6–15.1)
GFR SERPL CREATININE-BSD FRML MDRD: 35 ML/MIN/1.73SQ M
GLUCOSE SERPL-MCNC: 123 MG/DL (ref 65–140)
HCT VFR BLD AUTO: 41.8 % (ref 34.8–46.1)
HGB BLD-MCNC: 13.4 G/DL (ref 11.5–15.4)
LYMPHOCYTES # BLD AUTO: 1.6 THOUSAND/UL (ref 0.6–4.47)
LYMPHOCYTES # BLD AUTO: 16 % (ref 14–44)
MAGNESIUM SERPL-MCNC: 2.3 MG/DL (ref 1.6–2.6)
MCH RBC QN AUTO: 30.5 PG (ref 26.8–34.3)
MCHC RBC AUTO-ENTMCNC: 32.1 G/DL (ref 31.4–37.4)
MCV RBC AUTO: 95 FL (ref 82–98)
MONOCYTES # BLD AUTO: 0.5 THOUSAND/UL (ref 0–1.22)
MONOCYTES NFR BLD: 5 % (ref 4–12)
NEUTROPHILS # BLD MANUAL: 7.92 THOUSAND/UL (ref 1.85–7.62)
NEUTS SEG NFR BLD AUTO: 79 % (ref 43–75)
NRBC BLD AUTO-RTO: 0 /100 WBCS
NT-PROBNP SERPL-MCNC: 6142 PG/ML
PHOSPHATE SERPL-MCNC: 3.9 MG/DL (ref 2.3–4.1)
PLATELET # BLD AUTO: 193 THOUSANDS/UL (ref 149–390)
PLATELET BLD QL SMEAR: ADEQUATE
PMV BLD AUTO: 11 FL (ref 8.9–12.7)
POTASSIUM SERPL-SCNC: 4.2 MMOL/L (ref 3.5–5.3)
PROT SERPL-MCNC: 7.5 G/DL (ref 6.4–8.2)
RBC # BLD AUTO: 4.4 MILLION/UL (ref 3.81–5.12)
RBC MORPH BLD: NORMAL
SODIUM SERPL-SCNC: 146 MMOL/L (ref 136–145)
T4 FREE SERPL-MCNC: 1.35 NG/DL (ref 0.76–1.46)
TOTAL CELLS COUNTED SPEC: 100
TROPONIN I SERPL-MCNC: <0.02 NG/ML
TSH SERPL DL<=0.05 MIU/L-ACNC: 0.03 UIU/ML (ref 0.36–3.74)
WBC # BLD AUTO: 10.03 THOUSAND/UL (ref 4.31–10.16)

## 2021-08-03 PROCEDURE — 84484 ASSAY OF TROPONIN QUANT: CPT | Performed by: STUDENT IN AN ORGANIZED HEALTH CARE EDUCATION/TRAINING PROGRAM

## 2021-08-03 PROCEDURE — 99223 1ST HOSP IP/OBS HIGH 75: CPT | Performed by: FAMILY MEDICINE

## 2021-08-03 PROCEDURE — 85007 BL SMEAR W/DIFF WBC COUNT: CPT | Performed by: STUDENT IN AN ORGANIZED HEALTH CARE EDUCATION/TRAINING PROGRAM

## 2021-08-03 PROCEDURE — 83735 ASSAY OF MAGNESIUM: CPT | Performed by: STUDENT IN AN ORGANIZED HEALTH CARE EDUCATION/TRAINING PROGRAM

## 2021-08-03 PROCEDURE — 71046 X-RAY EXAM CHEST 2 VIEWS: CPT

## 2021-08-03 PROCEDURE — 96374 THER/PROPH/DIAG INJ IV PUSH: CPT

## 2021-08-03 PROCEDURE — 93005 ELECTROCARDIOGRAM TRACING: CPT

## 2021-08-03 PROCEDURE — 85025 COMPLETE CBC W/AUTO DIFF WBC: CPT | Performed by: STUDENT IN AN ORGANIZED HEALTH CARE EDUCATION/TRAINING PROGRAM

## 2021-08-03 PROCEDURE — 99285 EMERGENCY DEPT VISIT HI MDM: CPT

## 2021-08-03 PROCEDURE — 84100 ASSAY OF PHOSPHORUS: CPT | Performed by: STUDENT IN AN ORGANIZED HEALTH CARE EDUCATION/TRAINING PROGRAM

## 2021-08-03 PROCEDURE — 99285 EMERGENCY DEPT VISIT HI MDM: CPT | Performed by: STUDENT IN AN ORGANIZED HEALTH CARE EDUCATION/TRAINING PROGRAM

## 2021-08-03 PROCEDURE — 84443 ASSAY THYROID STIM HORMONE: CPT | Performed by: STUDENT IN AN ORGANIZED HEALTH CARE EDUCATION/TRAINING PROGRAM

## 2021-08-03 PROCEDURE — 36415 COLL VENOUS BLD VENIPUNCTURE: CPT | Performed by: STUDENT IN AN ORGANIZED HEALTH CARE EDUCATION/TRAINING PROGRAM

## 2021-08-03 PROCEDURE — 83880 ASSAY OF NATRIURETIC PEPTIDE: CPT | Performed by: STUDENT IN AN ORGANIZED HEALTH CARE EDUCATION/TRAINING PROGRAM

## 2021-08-03 PROCEDURE — 94760 N-INVAS EAR/PLS OXIMETRY 1: CPT

## 2021-08-03 PROCEDURE — 85027 COMPLETE CBC AUTOMATED: CPT | Performed by: STUDENT IN AN ORGANIZED HEALTH CARE EDUCATION/TRAINING PROGRAM

## 2021-08-03 PROCEDURE — 80053 COMPREHEN METABOLIC PANEL: CPT | Performed by: STUDENT IN AN ORGANIZED HEALTH CARE EDUCATION/TRAINING PROGRAM

## 2021-08-03 PROCEDURE — 84439 ASSAY OF FREE THYROXINE: CPT | Performed by: STUDENT IN AN ORGANIZED HEALTH CARE EDUCATION/TRAINING PROGRAM

## 2021-08-03 RX ORDER — ALBUTEROL SULFATE 90 UG/1
2 AEROSOL, METERED RESPIRATORY (INHALATION) EVERY 4 HOURS PRN
Status: DISCONTINUED | OUTPATIENT
Start: 2021-08-03 | End: 2021-08-07 | Stop reason: HOSPADM

## 2021-08-03 RX ORDER — BUSPIRONE HYDROCHLORIDE 5 MG/1
5 TABLET ORAL AS NEEDED
COMMUNITY

## 2021-08-03 RX ORDER — FUROSEMIDE 10 MG/ML
60 INJECTION INTRAMUSCULAR; INTRAVENOUS
Status: DISCONTINUED | OUTPATIENT
Start: 2021-08-03 | End: 2021-08-04

## 2021-08-03 RX ORDER — SIMETHICONE 80 MG
80 TABLET,CHEWABLE ORAL 4 TIMES DAILY PRN
Status: DISCONTINUED | OUTPATIENT
Start: 2021-08-03 | End: 2021-08-07 | Stop reason: HOSPADM

## 2021-08-03 RX ORDER — DILTIAZEM HYDROCHLORIDE 120 MG/1
120 CAPSULE, COATED, EXTENDED RELEASE ORAL 2 TIMES DAILY
Status: DISCONTINUED | OUTPATIENT
Start: 2021-08-03 | End: 2021-08-07 | Stop reason: HOSPADM

## 2021-08-03 RX ORDER — NICOTINE 21 MG/24HR
1 PATCH, TRANSDERMAL 24 HOURS TRANSDERMAL DAILY
Status: DISCONTINUED | OUTPATIENT
Start: 2021-08-03 | End: 2021-08-03

## 2021-08-03 RX ORDER — METOPROLOL TARTRATE 5 MG/5ML
5 INJECTION INTRAVENOUS EVERY 6 HOURS PRN
Status: DISCONTINUED | OUTPATIENT
Start: 2021-08-03 | End: 2021-08-07 | Stop reason: HOSPADM

## 2021-08-03 RX ORDER — DILTIAZEM HYDROCHLORIDE 180 MG/1
180 CAPSULE, COATED, EXTENDED RELEASE ORAL 2 TIMES DAILY
Status: DISCONTINUED | OUTPATIENT
Start: 2021-08-03 | End: 2021-08-03

## 2021-08-03 RX ORDER — ACETAMINOPHEN 325 MG/1
650 TABLET ORAL EVERY 4 HOURS PRN
Status: DISCONTINUED | OUTPATIENT
Start: 2021-08-03 | End: 2021-08-07 | Stop reason: HOSPADM

## 2021-08-03 RX ORDER — FUROSEMIDE 10 MG/ML
60 INJECTION INTRAMUSCULAR; INTRAVENOUS ONCE
Status: COMPLETED | OUTPATIENT
Start: 2021-08-03 | End: 2021-08-03

## 2021-08-03 RX ORDER — FUROSEMIDE 10 MG/ML
60 INJECTION INTRAMUSCULAR; INTRAVENOUS
Status: DISCONTINUED | OUTPATIENT
Start: 2021-08-03 | End: 2021-08-03

## 2021-08-03 RX ORDER — PROPRANOLOL HYDROCHLORIDE 20 MG/1
160 TABLET ORAL EVERY 6 HOURS SCHEDULED
Status: DISCONTINUED | OUTPATIENT
Start: 2021-08-03 | End: 2021-08-05

## 2021-08-03 RX ORDER — METOPROLOL TARTRATE 5 MG/5ML
5 INJECTION INTRAVENOUS ONCE
Status: COMPLETED | OUTPATIENT
Start: 2021-08-03 | End: 2021-08-03

## 2021-08-03 RX ORDER — FLUTICASONE PROPIONATE 110 UG/1
2 AEROSOL, METERED RESPIRATORY (INHALATION)
Status: DISCONTINUED | OUTPATIENT
Start: 2021-08-03 | End: 2021-08-07 | Stop reason: HOSPADM

## 2021-08-03 RX ORDER — ONDANSETRON 2 MG/ML
4 INJECTION INTRAMUSCULAR; INTRAVENOUS EVERY 6 HOURS PRN
Status: DISCONTINUED | OUTPATIENT
Start: 2021-08-03 | End: 2021-08-07 | Stop reason: HOSPADM

## 2021-08-03 RX ORDER — METHIMAZOLE 5 MG/1
10 TABLET ORAL DAILY
Status: DISCONTINUED | OUTPATIENT
Start: 2021-08-04 | End: 2021-08-07 | Stop reason: HOSPADM

## 2021-08-03 RX ORDER — PROPRANOLOL HYDROCHLORIDE 80 MG/1
160 TABLET ORAL EVERY 6 HOURS SCHEDULED
COMMUNITY

## 2021-08-03 RX ORDER — DILTIAZEM HYDROCHLORIDE 120 MG/1
120 TABLET, FILM COATED ORAL EVERY 12 HOURS SCHEDULED
COMMUNITY

## 2021-08-03 RX ORDER — OXYCODONE HYDROCHLORIDE 5 MG/1
5 TABLET ORAL EVERY 4 HOURS PRN
Status: DISCONTINUED | OUTPATIENT
Start: 2021-08-03 | End: 2021-08-07 | Stop reason: HOSPADM

## 2021-08-03 RX ORDER — POLYETHYLENE GLYCOL 3350 17 G/17G
17 POWDER, FOR SOLUTION ORAL DAILY
Status: DISCONTINUED | OUTPATIENT
Start: 2021-08-03 | End: 2021-08-07 | Stop reason: HOSPADM

## 2021-08-03 RX ORDER — DILTIAZEM HYDROCHLORIDE 120 MG/1
120 CAPSULE, COATED, EXTENDED RELEASE ORAL DAILY
Status: DISCONTINUED | OUTPATIENT
Start: 2021-08-03 | End: 2021-08-03

## 2021-08-03 RX ORDER — DOCUSATE SODIUM 100 MG/1
100 CAPSULE, LIQUID FILLED ORAL 2 TIMES DAILY
Status: DISCONTINUED | OUTPATIENT
Start: 2021-08-03 | End: 2021-08-07 | Stop reason: HOSPADM

## 2021-08-03 RX ADMIN — DILTIAZEM HYDROCHLORIDE 120 MG: 120 CAPSULE, COATED, EXTENDED RELEASE ORAL at 18:39

## 2021-08-03 RX ADMIN — FLUTICASONE PROPIONATE 2 PUFF: 110 AEROSOL, METERED RESPIRATORY (INHALATION) at 21:13

## 2021-08-03 RX ADMIN — FUROSEMIDE 60 MG: 10 INJECTION, SOLUTION INTRAMUSCULAR; INTRAVENOUS at 12:14

## 2021-08-03 RX ADMIN — METOPROLOL TARTRATE 5 MG: 1 INJECTION, SOLUTION INTRAVENOUS at 14:00

## 2021-08-03 RX ADMIN — Medication 400 MG: at 17:34

## 2021-08-03 RX ADMIN — PROPRANOLOL HYDROCHLORIDE 160 MG: 20 TABLET ORAL at 17:34

## 2021-08-03 RX ADMIN — FUROSEMIDE 60 MG: 10 INJECTION, SOLUTION INTRAMUSCULAR; INTRAVENOUS at 18:39

## 2021-08-03 NOTE — ED NOTES
HOLDING LOPRESSOR for  BP low parameters as per Dr Mian Vaca       [address] RN  08/03/21 5184

## 2021-08-03 NOTE — ED PROVIDER NOTES
History  Chief Complaint   Patient presents with    Shortness of Breath     pt c/o increased sob w/lower leg edema and cough for past 2 wks  seen by cardiologist today and sent to ED per rapid afib control  denies pain/travel/fevers/n/v/d       Shortness of Breath  Onset quality:  Gradual  Duration:  2 weeks  Timing:  Intermittent  Progression:  Worsening  Chronicity:  New  Context: activity    Context: not URI    Relieved by:  Nothing  Worsened by:  Exertion and movement  Ineffective treatments:  Diuretics  Associated symptoms: no abdominal pain, no chest pain, no cough, no fever, no headaches, no neck pain, no rash, no sore throat, no sputum production, no vomiting and no wheezing       77year old F  COPD, HTN, hyperthyroidism, AF on xarelto  AF thought to be 2/2 hyperthyroidism  Presents to the ED with worsening lower extremity edema and exertional dyspnea x 2 weeks  Has been compliant with all medications including PO Lasix 20 mg daily  Denies fever/chills, URI symptoms  Was evaluated by her cardiologist this morning  She examined fluid overloaded  Cardiology urged the patient to be evaluated in the ED for IV diuresis  It was noted that the patient gained 15 lbs in 2 weeks  Denies increased orthopnea, PND, abdominal distension  Prior to Admission Medications   Prescriptions Last Dose Informant Patient Reported? Taking?    albuterol (PROVENTIL HFA,VENTOLIN HFA) 90 mcg/act inhaler   Yes No   Sig: Inhale 2 puffs   diltiazem (CARDIZEM CD) 180 mg 24 hr capsule   No No   Sig: Take 1 capsule (180 mg total) by mouth 2 (two) times a day   fluticasone (FLOVENT HFA) 110 MCG/ACT inhaler   Yes No   Sig: Inhale 2 puffs   furosemide (LASIX) 20 mg tablet   No No   Sig: Take 1 tablet (20 mg total) by mouth daily   magnesium oxide (MAG-OX) 400 mg   No No   Sig: Take 1 tablet (400 mg total) by mouth 2 (two) times a day   methimazole (TAPAZOLE) 10 mg tablet   No No   Sig: Take 1 tablet (10 mg total) by mouth daily propranolol (INDERAL) 80 mg tablet   No No   Sig: Take 2 tablets (160 mg total) by mouth every 6 (six) hours   rivaroxaban (XARELTO) 15 mg tablet   Yes No   Sig: Take 15 mg by mouth      Facility-Administered Medications: None       Past Medical History:   Diagnosis Date    COPD (chronic obstructive pulmonary disease) (HCC)     High cholesterol     Hypertension        Past Surgical History:   Procedure Laterality Date    APPENDECTOMY      CATARACT EXTRACTION Bilateral        Family History   Problem Relation Age of Onset    Uterine cancer Mother     Lung disease Father     No Known Problems Sister     No Known Problems Sister      I have reviewed and agree with the history as documented  E-Cigarette/Vaping    E-Cigarette Use Never User      E-Cigarette/Vaping Substances    Nicotine No     THC No     CBD No     Flavoring No     Other No     Unknown No      Social History     Tobacco Use    Smoking status: Current Every Day Smoker     Packs/day: 0 50     Types: Cigarettes    Smokeless tobacco: Never Used    Tobacco comment: 30 pk yr hx   Vaping Use    Vaping Use: Never used   Substance Use Topics    Alcohol use: Yes     Comment: occasionally    Drug use: Never     Review of Systems   Constitutional: Negative for chills and fever  HENT: Negative for congestion, rhinorrhea, sinus pressure, sinus pain and sore throat  Eyes: Negative for pain and visual disturbance  Respiratory: Positive for shortness of breath  Negative for cough, sputum production, chest tightness and wheezing  Cardiovascular: Positive for leg swelling  Negative for chest pain and palpitations  Gastrointestinal: Negative for abdominal pain, nausea and vomiting  Genitourinary: Negative for decreased urine volume, difficulty urinating, flank pain and urgency  Musculoskeletal: Negative for back pain and neck pain  Skin: Negative for color change, rash and wound     Neurological: Negative for dizziness, weakness, light-headedness and headaches  Hematological: Bruises/bleeds easily  Psychiatric/Behavioral: Negative for confusion  The patient is not nervous/anxious  Physical Exam  Physical Exam  Vitals and nursing note reviewed  Constitutional:       General: She is not in acute distress  Appearance: She is not ill-appearing or toxic-appearing  HENT:      Head: Normocephalic and atraumatic  Mouth/Throat:      Mouth: Mucous membranes are moist       Pharynx: Oropharynx is clear  Eyes:      Extraocular Movements: Extraocular movements intact  Pupils: Pupils are equal, round, and reactive to light  Cardiovascular:      Rate and Rhythm: Tachycardia present  Rhythm irregular  Pulmonary:      Effort: Pulmonary effort is normal  No tachypnea or respiratory distress  Breath sounds: Decreased breath sounds present  Chest:      Chest wall: No tenderness  Abdominal:      Palpations: Abdomen is soft  Tenderness: There is no abdominal tenderness  There is no guarding or rebound  Musculoskeletal:      Right lower leg: Edema present  Left lower leg: Edema present  Skin:     General: Skin is warm and dry  Capillary Refill: Capillary refill takes less than 2 seconds  Neurological:      General: No focal deficit present  Mental Status: She is alert and oriented to person, place, and time  Cranial Nerves: No cranial nerve deficit  Motor: No weakness  Psychiatric:         Mood and Affect: Mood normal  Mood is not anxious  Behavior: Behavior normal  Behavior is not agitated         Vital Signs  ED Triage Vitals [08/03/21 1147]   Temperature Pulse Respirations Blood Pressure SpO2   98 6 °F (37 °C) (!) 106 22 (!) 155/101 95 %      Temp Source Heart Rate Source Patient Position - Orthostatic VS BP Location FiO2 (%)   Temporal Monitor Lying Right arm --      Pain Score       --           Vitals:    08/03/21 1330 08/03/21 1345 08/03/21 1400 08/03/21 1415   BP: 118/94 (!) 132/101 (!) 132/107 132/88   Pulse: (!) 116 (!) 112 (!) 114 (!) 123   Patient Position - Orthostatic VS:         ED Medications  Medications   furosemide (LASIX) injection 60 mg (60 mg Intravenous Given 8/3/21 1214)   metoprolol (LOPRESSOR) injection 5 mg (5 mg Intravenous Given 8/3/21 1400)     Diagnostic Studies  Results Reviewed     Procedure Component Value Units Date/Time    TSH, 3rd generation with Free T4 reflex [[id number]]  (Abnormal) Collected: 08/03/21 1211    Lab Status: Final result Specimen: Blood from Arm, Left Updated: 08/03/21 1318     TSH 3RD GENERATON 0 025 uIU/mL     Narrative:      Patients undergoing fluorescein dye angiography may retain small amounts of fluorescein in the body for 48-72 hours post procedure  Samples containing fluorescein can produce falsely depressed TSH values  If the patient had this procedure,a specimen should be resubmitted post fluorescein clearance  T4, free [id number] Collected: 08/03/21 1211    Lab Status:  In process Specimen: Blood from Arm, Left Updated: 08/03/21 1318    NT-BNP PRO [[id number]]  (Abnormal) Collected: 08/03/21 1211    Lab Status: Final result Specimen: Blood from Arm, Left Updated: 08/03/21 1245     NT-proBNP 6,142 pg/mL     Magnesium [[id number]]  (Normal) Collected: 08/03/21 1211    Lab Status: Final result Specimen: Blood from Arm, Left Updated: 08/03/21 1245     Magnesium 2 3 mg/dL     Phosphorus [[id number]]  (Normal) Collected: 08/03/21 1211    Lab Status: Final result Specimen: Blood from Arm, Left Updated: 08/03/21 1245     Phosphorus 3 9 mg/dL     Manual Differential(PHLEBS Do Not Order) [[id number]]  (Abnormal) Collected: 08/03/21 1212    Lab Status: Final result Specimen: Blood from Arm, Left Updated: 08/03/21 1245     Segmented % 79 %      Lymphocytes % 16 %      Monocytes % 5 %      Eosinophils, % 0 %      Basophils % 0 %      Absolute Neutrophils 7 92 Thousand/uL      Lymphocytes Absolute 1 60 Thousand/uL      Monocytes Absolute 0 50 Thousand/uL      Eosinophils Absolute 0 00 Thousand/uL      Basophils Absolute 0 00 Thousand/uL      Total Counted 100     RBC Morphology Normal     Platelet Estimate Adequate    Narrative:      WBC has been corrected due to the presence of NRBC, please see adjusted WBC     Comprehensive metabolic panel [[id number]]  (Abnormal) Collected: 08/03/21 1211    Lab Status: Final result Specimen: Blood from Arm, Left Updated: 08/03/21 1241     Sodium 146 mmol/L      Potassium 4 2 mmol/L      Chloride 107 mmol/L      CO2 28 mmol/L      ANION GAP 11 mmol/L      BUN 21 mg/dL      Creatinine 1 54 mg/dL      Glucose 123 mg/dL      Calcium 9 0 mg/dL      AST 22 U/L      ALT 29 U/L      Alkaline Phosphatase 100 U/L      Total Protein 7 5 g/dL      Albumin 3 5 g/dL      Total Bilirubin 0 83 mg/dL      eGFR 35 ml/min/1 73sq m     Narrative:      [facility] guidelines for Chronic Kidney Disease (CKD):     Stage 1 with normal or high GFR (GFR > 90 mL/min/1 73 square meters)    Stage 2 Mild CKD (GFR = 60-89 mL/min/1 73 square meters)    Stage 3A Moderate CKD (GFR = 45-59 mL/min/1 73 square meters)    Stage 3B Moderate CKD (GFR = 30-44 mL/min/1 73 square meters)    Stage 4 Severe CKD (GFR = 15-29 mL/min/1 73 square meters)    Stage 5 End Stage CKD (GFR <15 mL/min/1 73 square meters)  Note: GFR calculation is accurate only with a steady state creatinine    Troponin I [[id number]]  (Normal) Collected: 08/03/21 1211    Lab Status: Final result Specimen: Blood from Arm, Left Updated: 08/03/21 1238     Troponin I <0 02 ng/mL     CBC and differential [[id number]] Collected: 08/03/21 1212    Lab Status: Final result Specimen: Blood from Arm, Left Updated: 08/03/21 1228     WBC 10 03 Thousand/uL      RBC 4 40 Million/uL      Hemoglobin 13 4 g/dL      Hematocrit 41 8 %      MCV 95 fL      MCH 30 5 pg      MCHC 32 1 g/dL      RDW 14 7 %      MPV 11 0 fL      Platelets 239 Thousands/uL      nRBC 0 /100 WBCs Narrative: This is an appended report  These results have been appended to a previously verified report  XR chest 2 views   ED Interpretation by Gibran Morrissey DO (08/03 1300)   Possible mild pulmonary congestion  No other acute cardiopulmonary abnormalities noted  Procedures  ECG 12 Lead Documentation Only    Date/Time: 8/3/2021 11:46 AM  Performed by: Gibran Morrissey DO  Authorized by: Gibran Morrissey DO     Indications / Diagnosis:  Rapid heart rate  ECG reviewed by me, the ED Provider: yes    Patient location:  ED  Interpretation:     Interpretation: abnormal    Rate:     ECG rate:  100    ECG rate assessment: tachycardic    Rhythm:     Rhythm: atrial fibrillation    Ectopy:     Ectopy: none    QRS:     QRS axis:  Left    QRS intervals:  Normal  Conduction:     Conduction: normal    ST segments:     ST segments:  Normal  T waves:     T waves: normal         ED Course     MDM     77year old F  Hx of AF on Xarelto, hyperthyroidism, HFpEF  On Lasix 20 mg  Having increased weight gain despite being compliant with oral Lasix  In addition to weight gain, the patient expressed exertional dyspnea  Was evaluated by her Cardiologist earlier in the day who recommended ED evaluation  Throughout the course of treatment in the ED, the patient had periods of AF RVR  HR ranged between  bpm   Was hypertensive  No significant laboratory abnormalities  Renal function is stable  The patient was administered a dose of IV Lasix 60 mg, IV Lopressor  Admitted to the Hospitalist service  The patient was stable while under my care       Disposition  Final diagnoses:   Atrial fibrillation with RVR (HCC)   Bilateral lower extremity edema   Acute on chronic diastolic heart failure ([address] )     Time reflects when diagnosis was documented in both MDM as applicable and the Disposition within this note     Time User Action Codes Description Comment    8/3/2021  1:01 PM Doc Coats [I48 91] Atrial fibrillation with RVR ([facility] )     8/3/2021  1:01 PM Hawa Harlan Add [R60 0] Bilateral lower extremity edema     8/3/2021  2:17 PM Aminta Lenz Add [I50 20] HFrEF (heart failure with reduced ejection fraction) ([facility] )     8/3/2021  2:23 PM Hawa Harlan Add [I50 33] Acute on chronic diastolic heart failure [facility])       ED Disposition     ED Disposition Condition Date/Time Comment    Admit Stable Tue Aug 3, 2021  1:00 PM Case was discussed with Dr Alea Yates and the patient's admission status was agreed to be Admission Status: inpatient status to the service of Dr Alea Platt    None         Patient's Medications   Discharge Prescriptions    No medications on file     No discharge procedures on file      PDMP Review       Value Time User    PDMP Reviewed  Yes 8/3/2021  2:08 PM Winda Cooks, MD          ED Provider  Electronically Signed by           Jeffery Chaudhry DO  08/03/21 3105

## 2021-08-03 NOTE — ASSESSMENT & PLAN NOTE
Patient was recently admitted into this hospital due to new onset AFib secondary to hyperthyroidism  Continue medication including Xarelto  Continue propanolol in the setting of hyper thyroidism  Patient is status post IV Lopressor in ER  EKG:  Heart rate is ranging between 90-120s  Follow cardiology recommendation

## 2021-08-03 NOTE — ASSESSMENT & PLAN NOTE
Baseline creatinine is 1 2  Today creatinine is 1 54  With the setting of gaining weight, will continue IV diuretics and monitor response  Follow cardiology recommendation

## 2021-08-03 NOTE — H&P
Nan Mcdaniel 149 1955, 77 y o  female MRN: [id number]  Unit/Bed#: -01 Encounter: [id number]  Primary Care Provider: Jessica Damon DO   Date and time admitted to hospital: 8/3/2021 11:41 AM    New onset a-fib [facility])  Assessment & Plan  Patient was recently admitted into this hospital due to new onset AFib secondary to hyperthyroidism  Continue medication including Xarelto  Continue propanolol in the setting of hyper thyroidism  Patient is status post IV Lopressor in ER  EKG:  Heart rate is ranging between 90-120s  Follow cardiology recommendation    * acute heart failure with preserved ejection fraction  Assessment & Plan  Wt Readings from Last 3 Encounters:   08/03/21 119 kg (261 lb 11 oz)   07/02/21 110 kg (242 lb 8 1 oz)   05/12/21 119 kg (262 lb)     Could be secondary to AFib secondary to hyperthyroidism  Recent history shows 15 lb weight gain  Status post IV Lasix 60 mg-continue and monitor response, adjust the dose as per protocol  Monitor renal function  Echocardiogram on last admission shows ejection fraction 50%  Maintain intake and output, standing weight  Follow cardiology consult          KAELYN (acute kidney injury) ([facility] )  Assessment & Plan  Baseline creatinine is 1 2  Today creatinine is 1 54  With the setting of gaining weight, will continue IV diuretics and monitor response  Follow cardiology recommendation    Hyperthyroidism  Assessment & Plan  TSH is still low  Continue methimazole    VTE Pharmacologic Prophylaxis: VTE Score: 4 Moderate Risk (Score 3-4) - Pharmacological DVT Prophylaxis Ordered: rivaroxaban (Xarelto)  Code Status: Level 1 - Full Code as per patient  Discussion with family: With patient herself  Anticipated Length of Stay: Patient will be admitted on an inpatient basis with an anticipated length of stay of greater than 2 midnights secondary to To monitor above condition      Total Time for Visit, including Counseling / Coordination of Care: 45 minutes Greater than 50% of this total time spent on direct patient counseling and coordination of care  Chief Complaint:  Shortness of breath and weight gain    History of Present Illness: Ata Parrish is a 77 y o  female with a PMH of AFib, hyperthyroidism who presents with shortness of breath, weight gain  Patient recently diagnosed with AFib secondary to hyperthyroidism, went to see Cardiology for follow-up, where she was complaining shortness of breath and found weight gain, patient almost gained 15 lb  Denies any nausea, vomiting, diarrhea  Denies any chest pain  Review of Systems:  Review of Systems   Constitutional: Positive for activity change, fatigue and unexpected weight change  Negative for appetite change, chills, diaphoresis and fever  HENT: Negative for congestion, dental problem and sore throat  Respiratory: Positive for shortness of breath  Negative for apnea, wheezing and stridor  Cardiovascular: Positive for palpitations  Negative for chest pain and leg swelling  Gastrointestinal: Negative for abdominal distention, abdominal pain, blood in stool, constipation and diarrhea  Genitourinary: Negative for difficulty urinating, dyspareunia and dysuria  Skin: Negative for color change, pallor and wound  Neurological: Negative for dizziness, tremors, syncope, facial asymmetry, speech difficulty, weakness and light-headedness  Hematological: Negative for adenopathy  Psychiatric/Behavioral: Negative for agitation and behavioral problems  All other systems reviewed and are negative        Past Medical and Surgical History:   Past Medical History:   Diagnosis Date    COPD (chronic obstructive pulmonary disease) ([address] )     High cholesterol     Hypertension        Past Surgical History:   Procedure Laterality Date    APPENDECTOMY      CATARACT EXTRACTION Bilateral        Meds/Allergies:  Prior to Admission medications    Medication Sig Start Date End Date Taking? Authorizing Provider   albuterol (PROVENTIL HFA,VENTOLIN HFA) 90 mcg/act inhaler Inhale 2 puffs 3/16/21   Historical Provider, MD   diltiazem (CARDIZEM CD) 180 mg 24 hr capsule Take 1 capsule (180 mg total) by mouth 2 (two) times a day 7/2/21   Raghavendra Guillermo MD   fluticasone (FLOVENT HFA) 110 MCG/ACT inhaler Inhale 2 puffs 6/21/21   Historical Provider, MD   furosemide (LASIX) 20 mg tablet Take 1 tablet (20 mg total) by mouth daily 7/3/21   Raghavendra Guillermo MD   magnesium oxide (MAG-OX) 400 mg Take 1 tablet (400 mg total) by mouth 2 (two) times a day 7/2/21   Raghavendra Guillermo MD   methimazole (TAPAZOLE) 10 mg tablet Take 1 tablet (10 mg total) by mouth daily 7/3/21   Raghavendra Guillermo MD   propranolol (INDERAL) 80 mg tablet Take 2 tablets (160 mg total) by mouth every 6 (six) hours 7/2/21 8/1/21  Raghavendra Guillermo MD   rivaroxaban [facility]) 15 mg tablet Take 15 mg by mouth 6/21/21 7/12/21  Historical Provider, MD     I have reviewed home medications with patient personally      Allergies: No Known Allergies    Social History:  Marital Status:    Occupation:  Unknown  Patient Pre-hospital Living Situation: Home  Patient Pre-hospital Level of Mobility: walks  Patient Pre-hospital Diet Restrictions:  Cardiac diet  Substance Use History:   Social History     Substance and Sexual Activity   Alcohol Use Yes    Comment: occasionally     Social History     Tobacco Use   Smoking Status Former Smoker    Packs/day: 0 50    Types: Cigarettes   Smokeless Tobacco Never Used   Tobacco Comment    30 pk yr hx     Social History     Substance and Sexual Activity   Drug Use Never       Family History:  Family History   Problem Relation Age of Onset    Uterine cancer Mother     Lung disease Father     No Known Problems Sister     No Known Problems Sister        Physical Exam:     Vitals:   Blood Pressure: 144/98 (08/03/21 1734)  Pulse: (!) 107 (08/03/21 1734)  Temperature: 98 4 °F (36 9 °C) (08/03/21 1415)  Temp Source: Oral (08/03/21 1415)  Respirations: 22 (08/03/21 1528)  Height: 5' 8" (172 7 cm) (08/03/21 1500)  Weight - Scale: 115 kg (253 lb 3 2 oz) (08/03/21 1500)  SpO2: 94 % (08/03/21 1733)    Physical Exam  Vitals and nursing note reviewed  Exam conducted with a chaperone present  Constitutional:       Appearance: She is not diaphoretic  HENT:      Head: Normocephalic  Nose: No congestion  Mouth/Throat:      Mouth: Mucous membranes are moist       Pharynx: No oropharyngeal exudate  Eyes:      General: No scleral icterus  Conjunctiva/sclera: Conjunctivae normal       Pupils: Pupils are equal, round, and reactive to light  Cardiovascular:      Rate and Rhythm: Tachycardia present  Heart sounds: No friction rub  No gallop  Pulmonary:      Effort: Pulmonary effort is normal  No respiratory distress  Breath sounds: No stridor  Rales present  No wheezing or rhonchi  Abdominal:      General: Abdomen is flat  Bowel sounds are normal  There is no distension  Palpations: There is no mass  Tenderness: There is no abdominal tenderness  Hernia: No hernia is present  Musculoskeletal:         General: No swelling  Cervical back: Normal range of motion  Right lower leg: No edema  Left lower leg: No edema  Skin:     General: Skin is warm  Capillary Refill: Capillary refill takes less than 2 seconds  Findings: No lesion  Neurological:      General: No focal deficit present  Mental Status: She is alert and oriented to person, place, and time  Cranial Nerves: No cranial nerve deficit  Sensory: No sensory deficit  Motor: No weakness        Coordination: Coordination normal    Psychiatric:         Mood and Affect: Mood normal          Additional Data:     Lab Results:  Results from last 7 days   Lab Units 08/03/21  1212   WBC Thousand/uL 10 03   HEMOGLOBIN g/dL 13 4   HEMATOCRIT % 41 8   PLATELETS Thousands/uL 193   LYMPHO PCT % 16   MONO PCT % 5   EOS PCT % 0     Results from last 7 days   Lab Units 08/03/21  1211   SODIUM mmol/L 146*   POTASSIUM mmol/L 4 2   CHLORIDE mmol/L 107   CO2 mmol/L 28   BUN mg/dL 21   CREATININE mg/dL 1 54*   ANION GAP mmol/L 11   CALCIUM mg/dL 9 0   ALBUMIN g/dL 3 5   TOTAL BILIRUBIN mg/dL 0 83   ALK PHOS U/L 100   ALT U/L 29   AST U/L 22   GLUCOSE RANDOM mg/dL 123                       Imaging: Reviewed radiology reports from this admission including: chest xray  XR chest 2 views   ED Interpretation by Kassi Rosa DO (08/03 1300)   Possible mild pulmonary congestion  No other acute cardiopulmonary abnormalities noted  Final Result by Chula Barron MD (08/03 1700)      Findings are suspicious for mild pulmonary vascular congestion with trace bilateral effusions  Findings concur with the referring clinician's preliminary interpretation already in the patient's electronic health record  Workstation performed: [id number]             EKG and Other Studies Reviewed on Admission:   · EKG: Atrial fibrillation, with variable heart rates  ** Please Note: This note has been constructed using a voice recognition system   **

## 2021-08-03 NOTE — ED PROVIDER NOTES
History  No chief complaint on file  HPI    Prior to Admission Medications   Prescriptions Last Dose Informant Patient Reported? Taking? albuterol (PROVENTIL HFA,VENTOLIN HFA) 90 mcg/act inhaler   Yes No   Sig: Inhale 2 puffs   diltiazem (CARDIZEM CD) 180 mg 24 hr capsule   No No   Sig: Take 1 capsule (180 mg total) by mouth 2 (two) times a day   fluticasone (FLOVENT HFA) 110 MCG/ACT inhaler   Yes No   Sig: Inhale 2 puffs   furosemide (LASIX) 20 mg tablet   No No   Sig: Take 1 tablet (20 mg total) by mouth daily   magnesium oxide (MAG-OX) 400 mg   No No   Sig: Take 1 tablet (400 mg total) by mouth 2 (two) times a day   methimazole (TAPAZOLE) 10 mg tablet   No No   Sig: Take 1 tablet (10 mg total) by mouth daily   propranolol (INDERAL) 80 mg tablet   No No   Sig: Take 2 tablets (160 mg total) by mouth every 6 (six) hours   rivaroxaban (XARELTO) 15 mg tablet   Yes No   Sig: Take 15 mg by mouth      Facility-Administered Medications: None       Past Medical History:   Diagnosis Date    COPD (chronic obstructive pulmonary disease) (HCC)     High cholesterol     Hypertension        Past Surgical History:   Procedure Laterality Date    APPENDECTOMY      CATARACT EXTRACTION Bilateral        Family History   Problem Relation Age of Onset    Uterine cancer Mother     Lung disease Father     No Known Problems Sister     No Known Problems Sister      I have reviewed and agree with the history as documented      E-Cigarette/Vaping    E-Cigarette Use Never User      E-Cigarette/Vaping Substances    Nicotine No     THC No     CBD No     Flavoring No     Other No     Unknown No      Social History     Tobacco Use    Smoking status: Current Every Day Smoker     Packs/day: 0 50     Types: Cigarettes    Smokeless tobacco: Never Used    Tobacco comment: 30 pk yr hx   Vaping Use    Vaping Use: Never used   Substance Use Topics    Alcohol use: Yes     Comment: occasionally    Drug use: Never       Review of Systems   Constitutional: Negative for chills, diaphoresis, fatigue and fever  HENT: Negative for congestion, drooling, facial swelling, nosebleeds, sneezing, trouble swallowing and voice change  Eyes: Negative for photophobia, pain and visual disturbance  Respiratory: Negative for cough, chest tightness, shortness of breath and wheezing  Cardiovascular: Negative for chest pain, palpitations and leg swelling  Gastrointestinal: Negative for abdominal pain, constipation, diarrhea, nausea and vomiting  Genitourinary: Negative for decreased urine volume, difficulty urinating, dysuria, flank pain, frequency and hematuria  Musculoskeletal: Negative for arthralgias, back pain, myalgias, neck pain and neck stiffness  Skin: Negative for color change, pallor, rash and wound  Allergic/Immunologic: Negative for immunocompromised state  Neurological: Negative for dizziness, tremors, seizures, syncope, facial asymmetry, speech difficulty, weakness, light-headedness, numbness and headaches  Hematological: Negative for adenopathy  Psychiatric/Behavioral: Negative for agitation, confusion, hallucinations and suicidal ideas  The patient is not nervous/anxious  Physical Exam  Physical Exam  Vitals and nursing note reviewed  Constitutional:       General: She is not in acute distress  Appearance: Normal appearance  She is well-developed and normal weight  She is not ill-appearing, toxic-appearing or diaphoretic  HENT:      Head: Normocephalic and atraumatic  Jaw: There is normal jaw occlusion  Right Ear: Hearing, tympanic membrane, ear canal and external ear normal  There is no impacted cerumen  No mastoid tenderness  No hemotympanum  Left Ear: Hearing, tympanic membrane, ear canal and external ear normal  There is no impacted cerumen  No mastoid tenderness  No hemotympanum  Nose: Nose normal       Right Nostril: No epistaxis  Left Nostril: No epistaxis        Right Sinus: No maxillary sinus tenderness or frontal sinus tenderness  Left Sinus: No maxillary sinus tenderness or frontal sinus tenderness  Mouth/Throat:      Lips: Pink  No lesions  Mouth: Mucous membranes are moist  No lacerations or angioedema  Tongue: No lesions  Tongue does not deviate from midline  Palate: No mass and lesions  Pharynx: Oropharynx is clear  Uvula midline  No pharyngeal swelling, oropharyngeal exudate, posterior oropharyngeal erythema or uvula swelling  Tonsils: No tonsillar exudate or tonsillar abscesses  Eyes:      General: Lids are normal  Vision grossly intact  Gaze aligned appropriately  No visual field deficit or scleral icterus  Right eye: No discharge  Left eye: No discharge  Extraocular Movements: Extraocular movements intact  Right eye: No nystagmus  Left eye: No nystagmus  Conjunctiva/sclera: Conjunctivae normal       Right eye: Right conjunctiva is not injected  Left eye: Left conjunctiva is not injected  Pupils: Pupils are equal, round, and reactive to light  Neck:      Thyroid: No thyroid mass or thyromegaly  Trachea: Trachea and phonation normal    Cardiovascular:      Rate and Rhythm: Normal rate and regular rhythm  Pulses: Normal pulses  Radial pulses are 2+ on the right side and 2+ on the left side  Dorsalis pedis pulses are 2+ on the right side and 2+ on the left side  Heart sounds: Normal heart sounds, S1 normal and S2 normal    Pulmonary:      Effort: Pulmonary effort is normal  No tachypnea, accessory muscle usage, respiratory distress or retractions  Breath sounds: Normal breath sounds and air entry  No stridor or decreased air movement  No decreased breath sounds, wheezing, rhonchi or rales  Chest:      Chest wall: No tenderness  Abdominal:      General: Abdomen is flat  Bowel sounds are normal  There is no distension  Palpations: Abdomen is soft  Abdomen is not rigid  There is no mass  Tenderness: There is no abdominal tenderness  There is no right CVA tenderness, left CVA tenderness, guarding or rebound  Negative signs include Mcelroy's sign and McBurney's sign  Hernia: No hernia is present  Musculoskeletal:         General: No swelling, tenderness, deformity or signs of injury  Normal range of motion  Cervical back: Full passive range of motion without pain, normal range of motion and neck supple  No rigidity  No spinous process tenderness or muscular tenderness  Right lower leg: No edema  Left lower leg: No edema  Lymphadenopathy:      Cervical: No cervical adenopathy  Skin:     General: Skin is warm and dry  Capillary Refill: Capillary refill takes less than 2 seconds  Coloration: Skin is not ashen, cyanotic, jaundiced, mottled, pale or sallow  Findings: No abrasion, abscess, acne, bruising, burn, ecchymosis, erythema, signs of injury, laceration, lesion, petechiae, rash or wound  Rash is not macular or papular  Neurological:      General: No focal deficit present  Mental Status: She is alert and oriented to person, place, and time  Mental status is at baseline  She is not disoriented  GCS: GCS eye subscore is 4  GCS verbal subscore is 5  GCS motor subscore is 6  Cranial Nerves: Cranial nerves are intact  No cranial nerve deficit, dysarthria or facial asymmetry  Sensory: Sensation is intact  No sensory deficit  Motor: Motor function is intact  No weakness, tremor, atrophy, abnormal muscle tone or seizure activity  Coordination: Coordination is intact  Coordination normal       Gait: Gait is intact   Gait normal       Comments: Patient is AAOx4, GCS 15; speaking clearly and appropriately; motor and sensation intact; visual fields intact; cranial nerves II-XII grossly intact; no facial droop, slurred speech or arm drift   Psychiatric:         Attention and Perception: Attention and perception normal  She is attentive  Mood and Affect: Mood and affect normal          Speech: Speech normal          Behavior: Behavior normal  Behavior is cooperative  Thought Content: Thought content normal          Cognition and Memory: Cognition and memory normal          Judgment: Judgment normal          Vital Signs  ED Triage Vitals   Temp Pulse Resp BP SpO2   -- -- -- -- --      Temp src Heart Rate Source Patient Position - Orthostatic VS BP Location FiO2 (%)   -- -- -- -- --      Pain Score       --           There were no vitals filed for this visit  Visual Acuity      ED Medications  Medications - No data to display    Diagnostic Studies  Results Reviewed     None                 No orders to display              Procedures  Procedures         ED Course                                           MDM  Number of Diagnoses or Management Options     Amount and/or Complexity of Data Reviewed  Clinical lab tests: reviewed and ordered  Tests in the radiology section of CPT®: reviewed and ordered  Tests in the medicine section of CPT®: ordered and reviewed  Review and summarize past medical records: yes  Discuss the patient with other providers: yes  Independent visualization of images, tracings, or specimens: yes    Risk of Complications, Morbidity, and/or Mortality  Presenting problems: moderate  Diagnostic procedures: moderate  Management options: moderate    Patient Progress  Patient progress: stable      Disposition  Final diagnoses:   None     ED Disposition     None      Follow-up Information    None         Patient's Medications   Discharge Prescriptions    No medications on file     No discharge procedures on file      PDMP Review     None          ED Provider  Electronically Signed by    Choco Soto MD

## 2021-08-03 NOTE — PLAN OF CARE
Problem: Potential for Falls  Goal: Patient will remain free of falls  Description: INTERVENTIONS:  - Educate patient/family on patient safety including physical limitations  - Instruct patient to call for assistance with activity   - Consult OT/PT to assist with strengthening/mobility   - Keep Call bell within reach  - Keep bed low and locked with side rails adjusted as appropriate  - Keep care items and personal belongings within reach  - Initiate and maintain comfort rounds  - Make Fall Risk Sign visible to staff  - Apply yellow socks and bracelet for high fall risk patients  - Consider moving patient to room near nurses station  Outcome: Progressing     Problem: INFECTION - ADULT  Goal: Absence or prevention of progression during hospitalization  Description: INTERVENTIONS:  - Assess and monitor for signs and symptoms of infection  - Monitor lab/diagnostic results  - Monitor all insertion sites, i e  indwelling lines, tubes, and drains  - Monitor endotracheal if appropriate and nasal secretions for changes in amount and color  - [place] appropriate cooling/warming therapies per order  - Administer medications as ordered  - Instruct and encourage patient and family to use good hand hygiene technique  - Identify and instruct in appropriate isolation precautions for identified infection/condition  Outcome: Progressing     Problem: SAFETY ADULT  Goal: Patient will remain free of falls  Description: INTERVENTIONS:  - Educate patient/family on patient safety including physical limitations  - Instruct patient to call for assistance with activity   - Consult OT/PT to assist with strengthening/mobility   - Keep Call bell within reach  - Keep bed low and locked with side rails adjusted as appropriate  - Keep care items and personal belongings within reach  - Initiate and maintain comfort rounds  - Make Fall Risk Sign visible to staff  - Apply yellow socks and bracelet for high fall risk patients  - Consider moving patient to room near nurses station  Outcome: Progressing  Goal: Maintain or return to baseline ADL function  Description: INTERVENTIONS:  -  Assess patient's ability to carry out ADLs; assess patient's baseline for ADL function and identify physical deficits which impact ability to perform ADLs (bathing, care of mouth/teeth, toileting, grooming, dressing, etc )  - Assess/evaluate cause of self-care deficits   - Assess range of motion  - Assess patient's mobility; develop plan if impaired  - Assess patient's need for assistive devices and provide as appropriate  - Encourage maximum independence but intervene and supervise when necessary  - Involve family in performance of ADLs  - Assess for home care needs following discharge   - Consider OT consult to assist with ADL evaluation and planning for discharge  - Provide patient education as appropriate  Outcome: Progressing  Goal: Maintains/Returns to pre admission functional level  Description: INTERVENTIONS:  - Perform BMAT or MOVE assessment daily    - Set and communicate daily mobility goal to care team and patient/family/caregiver     - Collaborate with rehabilitation services on mobility goals if consulted  - Out of bed for toileting  - Record patient progress and toleration of activity level   Outcome: Progressing     Problem: DISCHARGE PLANNING  Goal: Discharge to home or other facility with appropriate resources  Description: INTERVENTIONS:  - Identify barriers to discharge w/patient and caregiver  - Arrange for needed discharge resources and transportation as appropriate  - Identify discharge learning needs (meds, wound care, etc )  - Arrange for interpretive services to assist at discharge as needed  - Refer to Case Management Department for coordinating discharge planning if the patient needs post-hospital services based on physician/advanced practitioner order or complex needs related to functional status, cognitive ability, or social support system  Outcome: Progressing     Problem: Knowledge Deficit  Goal: Patient/family/caregiver demonstrates understanding of disease process, treatment plan, medications, and discharge instructions  Description: Complete learning assessment and assess knowledge base    Interventions:  - Provide teaching at level of understanding  - Provide teaching via preferred learning methods  Outcome: Progressing     Problem: CARDIOVASCULAR - ADULT  Goal: Maintains optimal cardiac output and hemodynamic stability  Description: INTERVENTIONS:  - Monitor I/O, vital signs and rhythm  - Monitor for S/S and trends of decreased cardiac output  - Administer and titrate ordered vasoactive medications to optimize hemodynamic stability  - Assess quality of pulses, skin color and temperature  - Assess for signs of decreased coronary artery perfusion  - Instruct patient to report change in severity of symptoms  Outcome: Progressing  Goal: Absence of cardiac dysrhythmias or at baseline rhythm  Description: INTERVENTIONS:  - Continuous cardiac monitoring, vital signs, obtain 12 lead EKG if ordered  - Administer antiarrhythmic and heart rate control medications as ordered  - Monitor electrolytes and administer replacement therapy as ordered  Outcome: Progressing     Problem: SKIN/TISSUE INTEGRITY - ADULT  Goal: Skin Integrity remains intact(Skin Breakdown Prevention)  Description: Assess:  -Perform Gurwinder assessment every shift  -Clean and moisturize skin every shift  -Inspect skin when repositioning, toileting, and assisting with ADLS  -Assess extremities for adequate circulation and sensation     Bed Management:  -Have minimal linens on bed & keep smooth, unwrinkled  -Change linens as needed when moist or perspiring  -Avoid sitting or lying in one position for more than 2 hours while in bed      Activity:  -Encourage activity and walks on unit  -Encourage or provide ROM exercises   -Use appropriate equipment to lift or move patient in bed      Skin Care:  -Avoid use of baby powder, tape, friction and shearing, hot water or constrictive clothing  -Do not massage red bony areas    Outcome: Progressing  Goal: Incision(s), wounds(s) or drain site(s) healing without S/S of infection  Description: INTERVENTIONS  - Assess and document dressing, incision, wound bed, drain sites and surrounding tissue  - Provide patient and family education  Outcome: Progressing  Goal: Pressure injury heals and does not worsen  Description: Interventions:  - Implement low air loss mattress or specialty surface (Criteria met)  - Apply silicone foam dressing    - Utilize friction reducing device or surface for transfers   - Consider nutrition services referral as needed  Outcome: Progressing

## 2021-08-03 NOTE — ASSESSMENT & PLAN NOTE
Wt Readings from Last 3 Encounters:   08/03/21 119 kg (261 lb 11 oz)   07/02/21 110 kg (242 lb 8 1 oz)   05/12/21 119 kg (262 lb)     Could be secondary to AFib secondary to hyperthyroidism  Recent history shows 15 lb weight gain  Status post IV Lasix 60 mg-continue and monitor response, adjust the dose as per protocol  Monitor renal function  Echocardiogram on last admission shows ejection fraction 50%  Maintain intake and output, standing weight  Follow cardiology consult

## 2021-08-03 NOTE — RESPIRATORY THERAPY NOTE
RT Protocol Note  Corinne Stade Schappe 77 y o  female MRN: [id number]  Unit/Bed#: -01 Encounter: [id number]    Assessment    Principal Problem:    HFrEF (heart failure with reduced ejection fraction) ([facility] )  Active Problems:    New onset a-fib (HCC)    Hyperthyroidism    KAELYN (acute kidney injury) ([facility] )      Home Pulmonary Medications:         Past Medical History:   Diagnosis Date    COPD (chronic obstructive pulmonary disease) (HCC)     High cholesterol     Hypertension      Social History     Socioeconomic History    Marital status:      Spouse name: None    Number of children: None    Years of education: None    Highest education level: None   Occupational History    None   Tobacco Use    Smoking status: Former Smoker     Packs/day: 0 50     Types: Cigarettes    Smokeless tobacco: Never Used    Tobacco comment: 30 pk yr hx   Vaping Use    Vaping Use: Never used   Substance and Sexual Activity    Alcohol use: Yes     Comment: occasionally    Drug use: Never    Sexual activity: Not Currently   Other Topics Concern    None   Social History Narrative    None     Social Determinants of Health     Financial Resource Strain:     Difficulty of Paying Living Expenses:    Food Insecurity:     Worried About Running Out of Food in the Last Year:     Ran Out of Food in the Last Year:    Transportation Needs:     Lack of Transportation (Medical):      Lack of Transportation (Non-Medical):    Physical Activity:     Days of Exercise per Week:     Minutes of Exercise per Session:    Stress:     Feeling of Stress :    Social Connections:     Frequency of Communication with Friends and Family:     Frequency of Social Gatherings with Friends and Family:     Attends Orthodoxy Services:     Active Member of Clubs or Organizations:     Attends Club or Organization Meetings:     Marital Status:    Intimate Partner Violence:     Fear of Current or Ex-Partner:     Emotionally Abused:     Physically Abused:     Sexually Abused:        Subjective         Objective    Physical Exam:   Assessment Type: Assess only  General Appearance: Alert, Awake  Respiratory Pattern: Normal  Chest Assessment: Chest expansion symmetrical  Bilateral Breath Sounds: Clear, Diminished  O2 Device: room air    Vitals:  Blood pressure 132/88, pulse (!) 118, temperature 98 4 °F (36 9 °C), temperature source Oral, resp  rate 22, height 5' 8" (1 727 m), weight 115 kg (253 lb 3 2 oz), SpO2 95 %  Imaging and other studies: I have personally reviewed pertinent reports  O2 Device: room air     Plan    Respiratory Plan: Home Bronchodilator Patient pathway        Resp Comments: Pt arrived in ed w/ SINGER and lower leg edema  Pt has COPD  diagnosed by PFTs  Quit smoking in may 2021 but had smoked for 50+ yrs  Is stable on room air and denies being SOB at this time  Takes flovent 110 2Xs daily and prn albuterol inhale, used inhaler 2xs this am prior to comming to the hospital    denies need for prn at this time

## 2021-08-03 NOTE — NURSING NOTE
Dr Laurence Vitale aware that patient has been on tele and is A-fib 's  She does not stay sustained above 115 however  She will be due for the second dose of cardizem soon  Not due for the PRN IV lopressor at this time  Will monitor closely

## 2021-08-04 PROBLEM — E87.6 HYPOKALEMIA: Status: ACTIVE | Noted: 2021-08-04

## 2021-08-04 PROBLEM — I48.19 PERSISTENT ATRIAL FIBRILLATION (HCC): Status: ACTIVE | Noted: 2021-06-22

## 2021-08-04 LAB
ALBUMIN SERPL BCP-MCNC: 3.4 G/DL (ref 3.5–5)
ALP SERPL-CCNC: 93 U/L (ref 46–116)
ALT SERPL W P-5'-P-CCNC: 24 U/L (ref 12–78)
ANION GAP SERPL CALCULATED.3IONS-SCNC: 10 MMOL/L (ref 4–13)
ANION GAP SERPL CALCULATED.3IONS-SCNC: 7 MMOL/L (ref 4–13)
AST SERPL W P-5'-P-CCNC: 21 U/L (ref 5–45)
BASOPHILS # BLD AUTO: 0.05 THOUSANDS/ΜL (ref 0–0.1)
BASOPHILS NFR BLD AUTO: 1 % (ref 0–1)
BILIRUB SERPL-MCNC: 0.9 MG/DL (ref 0.2–1)
BUN SERPL-MCNC: 18 MG/DL (ref 5–25)
BUN SERPL-MCNC: 20 MG/DL (ref 5–25)
CALCIUM ALBUM COR SERPL-MCNC: 9.4 MG/DL (ref 8.3–10.1)
CALCIUM SERPL-MCNC: 8.9 MG/DL (ref 8.3–10.1)
CALCIUM SERPL-MCNC: 9.1 MG/DL (ref 8.3–10.1)
CHLORIDE SERPL-SCNC: 102 MMOL/L (ref 100–108)
CHLORIDE SERPL-SCNC: 104 MMOL/L (ref 100–108)
CO2 SERPL-SCNC: 32 MMOL/L (ref 21–32)
CO2 SERPL-SCNC: 32 MMOL/L (ref 21–32)
CREAT SERPL-MCNC: 1.45 MG/DL (ref 0.6–1.3)
CREAT SERPL-MCNC: 1.49 MG/DL (ref 0.6–1.3)
EOSINOPHIL # BLD AUTO: 0.03 THOUSAND/ΜL (ref 0–0.61)
EOSINOPHIL NFR BLD AUTO: 0 % (ref 0–6)
ERYTHROCYTE [DISTWIDTH] IN BLOOD BY AUTOMATED COUNT: 14.9 % (ref 11.6–15.1)
GFR SERPL CREATININE-BSD FRML MDRD: 36 ML/MIN/1.73SQ M
GFR SERPL CREATININE-BSD FRML MDRD: 37 ML/MIN/1.73SQ M
GLUCOSE SERPL-MCNC: 107 MG/DL (ref 65–140)
GLUCOSE SERPL-MCNC: 91 MG/DL (ref 65–140)
HCT VFR BLD AUTO: 38.8 % (ref 34.8–46.1)
HGB BLD-MCNC: 12.8 G/DL (ref 11.5–15.4)
IMM GRANULOCYTES # BLD AUTO: 0.03 THOUSAND/UL (ref 0–0.2)
IMM GRANULOCYTES NFR BLD AUTO: 0 % (ref 0–2)
LYMPHOCYTES # BLD AUTO: 2.22 THOUSANDS/ΜL (ref 0.6–4.47)
LYMPHOCYTES NFR BLD AUTO: 25 % (ref 14–44)
MAGNESIUM SERPL-MCNC: 2 MG/DL (ref 1.6–2.6)
MCH RBC QN AUTO: 30.4 PG (ref 26.8–34.3)
MCHC RBC AUTO-ENTMCNC: 33 G/DL (ref 31.4–37.4)
MCV RBC AUTO: 92 FL (ref 82–98)
MONOCYTES # BLD AUTO: 1.04 THOUSAND/ΜL (ref 0.17–1.22)
MONOCYTES NFR BLD AUTO: 12 % (ref 4–12)
NEUTROPHILS # BLD AUTO: 5.69 THOUSANDS/ΜL (ref 1.85–7.62)
NEUTS SEG NFR BLD AUTO: 62 % (ref 43–75)
NRBC BLD AUTO-RTO: 0 /100 WBCS
PLATELET # BLD AUTO: 174 THOUSANDS/UL (ref 149–390)
PMV BLD AUTO: 10.7 FL (ref 8.9–12.7)
POTASSIUM SERPL-SCNC: 2.9 MMOL/L (ref 3.5–5.3)
POTASSIUM SERPL-SCNC: 3.5 MMOL/L (ref 3.5–5.3)
PROT SERPL-MCNC: 7.1 G/DL (ref 6.4–8.2)
RBC # BLD AUTO: 4.21 MILLION/UL (ref 3.81–5.12)
SODIUM SERPL-SCNC: 141 MMOL/L (ref 136–145)
SODIUM SERPL-SCNC: 146 MMOL/L (ref 136–145)
WBC # BLD AUTO: 9.06 THOUSAND/UL (ref 4.31–10.16)

## 2021-08-04 PROCEDURE — 83735 ASSAY OF MAGNESIUM: CPT | Performed by: FAMILY MEDICINE

## 2021-08-04 PROCEDURE — 80053 COMPREHEN METABOLIC PANEL: CPT | Performed by: FAMILY MEDICINE

## 2021-08-04 PROCEDURE — 99233 SBSQ HOSP IP/OBS HIGH 50: CPT | Performed by: FAMILY MEDICINE

## 2021-08-04 PROCEDURE — 99222 1ST HOSP IP/OBS MODERATE 55: CPT | Performed by: INTERNAL MEDICINE

## 2021-08-04 PROCEDURE — 80048 BASIC METABOLIC PNL TOTAL CA: CPT | Performed by: FAMILY MEDICINE

## 2021-08-04 PROCEDURE — 85025 COMPLETE CBC W/AUTO DIFF WBC: CPT | Performed by: FAMILY MEDICINE

## 2021-08-04 RX ORDER — POTASSIUM CHLORIDE 20 MEQ/1
40 TABLET, EXTENDED RELEASE ORAL ONCE
Status: COMPLETED | OUTPATIENT
Start: 2021-08-04 | End: 2021-08-04

## 2021-08-04 RX ORDER — POTASSIUM CHLORIDE 20 MEQ/1
40 TABLET, EXTENDED RELEASE ORAL DAILY
Status: DISCONTINUED | OUTPATIENT
Start: 2021-08-05 | End: 2021-08-06

## 2021-08-04 RX ORDER — FUROSEMIDE 10 MG/ML
40 INJECTION INTRAMUSCULAR; INTRAVENOUS
Status: DISCONTINUED | OUTPATIENT
Start: 2021-08-04 | End: 2021-08-06

## 2021-08-04 RX ORDER — POTASSIUM CHLORIDE 14.9 MG/ML
20 INJECTION INTRAVENOUS
Status: COMPLETED | OUTPATIENT
Start: 2021-08-04 | End: 2021-08-04

## 2021-08-04 RX ADMIN — FUROSEMIDE 40 MG: 10 INJECTION, SOLUTION INTRAMUSCULAR; INTRAVENOUS at 14:11

## 2021-08-04 RX ADMIN — Medication 400 MG: at 09:05

## 2021-08-04 RX ADMIN — DILTIAZEM HYDROCHLORIDE 120 MG: 120 CAPSULE, COATED, EXTENDED RELEASE ORAL at 21:53

## 2021-08-04 RX ADMIN — PROPRANOLOL HYDROCHLORIDE 160 MG: 20 TABLET ORAL at 17:33

## 2021-08-04 RX ADMIN — METHIMAZOLE 10 MG: 5 TABLET ORAL at 09:04

## 2021-08-04 RX ADMIN — FLUTICASONE PROPIONATE 2 PUFF: 110 AEROSOL, METERED RESPIRATORY (INHALATION) at 09:05

## 2021-08-04 RX ADMIN — DILTIAZEM HYDROCHLORIDE 120 MG: 120 CAPSULE, COATED, EXTENDED RELEASE ORAL at 09:05

## 2021-08-04 RX ADMIN — PROPRANOLOL HYDROCHLORIDE 160 MG: 20 TABLET ORAL at 11:48

## 2021-08-04 RX ADMIN — PROPRANOLOL HYDROCHLORIDE 160 MG: 20 TABLET ORAL at 06:28

## 2021-08-04 RX ADMIN — FUROSEMIDE 60 MG: 10 INJECTION, SOLUTION INTRAMUSCULAR; INTRAVENOUS at 09:04

## 2021-08-04 RX ADMIN — Medication 400 MG: at 17:33

## 2021-08-04 RX ADMIN — PROPRANOLOL HYDROCHLORIDE 160 MG: 20 TABLET ORAL at 00:31

## 2021-08-04 RX ADMIN — RIVAROXABAN 15 MG: 15 TABLET, FILM COATED ORAL at 06:30

## 2021-08-04 RX ADMIN — POTASSIUM CHLORIDE 40 MEQ: 1500 TABLET, EXTENDED RELEASE ORAL at 09:34

## 2021-08-04 RX ADMIN — FLUTICASONE PROPIONATE 2 PUFF: 110 AEROSOL, METERED RESPIRATORY (INHALATION) at 20:58

## 2021-08-04 RX ADMIN — POTASSIUM CHLORIDE 20 MEQ: 14.9 INJECTION, SOLUTION INTRAVENOUS at 11:47

## 2021-08-04 RX ADMIN — POTASSIUM CHLORIDE 20 MEQ: 14.9 INJECTION, SOLUTION INTRAVENOUS at 09:34

## 2021-08-04 NOTE — ASSESSMENT & PLAN NOTE
Patient presented to her PCP office on 6/21/21 with c/o shortness of breath on exertion since May and found to be in new onset A fib with RVR  Outpatient labs - TSH <0 01, BNP 9997, Creatinine 2 1, K+ 5 2  She was taken via ambulance to [facility] ER  During admission endocrinology consulted for the suppressed TSH and she was started on Methimazole  She was discharged home on 7/2/2021 with Diltiazem 180mg BID and Propranolol 160 Q 6 hrs  Diltiazem was decreased to 120mg BID by primary care with consultation with her cardiologist on 7/13/2021 due to hypotension  At visit with cardiology outpatient on 8/2/2021 she was found to be in fluid overload with 15 pound weight gain, shortness of breath  She was sent back to [facility] ER  At this time heart rates are fairly controlled in 90's-110's with Dilitazem CD 120mg BID and Propranolol 160mg Q 6hrs  TSH minimally improved at 0 025 and Free T4 1 35    Continue telemetry during admission  Continue Lopressor 5mg IV PRN HR > 115  Optimize electrolytes for K+ > 4, Mag > 2  Continue Xarelto for anticoagulation

## 2021-08-04 NOTE — PLAN OF CARE
Problem: Potential for Falls  Goal: Patient will remain free of falls  Description: INTERVENTIONS:  - Educate patient/family on patient safety including physical limitations  - Instruct patient to call for assistance with activity   - Consult OT/PT to assist with strengthening/mobility   - Keep Call bell within reach  - Keep bed low and locked with side rails adjusted as appropriate  - Keep care items and personal belongings within reach  - Initiate and maintain comfort rounds  - Make Fall Risk Sign visible to staff  - Offer Toileting every 2 Hours, in advance of need  - Initiate/Maintain bed alarm  - Obtain necessary fall risk management equipment: yellow socks and bracelet  - Apply yellow socks and bracelet for high fall risk patients  - Consider moving patient to room near nurses station  Outcome: Progressing     Problem: INFECTION - ADULT  Goal: Absence or prevention of progression during hospitalization  Description: INTERVENTIONS:  - Assess and monitor for signs and symptoms of infection  - Monitor lab/diagnostic results  - Monitor all insertion sites, i e  indwelling lines, tubes, and drains  - Monitor endotracheal if appropriate and nasal secretions for changes in amount and color  - [place] appropriate cooling/warming therapies per order  - Administer medications as ordered  - Instruct and encourage patient and family to use good hand hygiene technique  - Identify and instruct in appropriate isolation precautions for identified infection/condition  Outcome: Progressing     Problem: SAFETY ADULT  Goal: Patient will remain free of falls  Description: INTERVENTIONS:  - Educate patient/family on patient safety including physical limitations  - Instruct patient to call for assistance with activity   - Consult OT/PT to assist with strengthening/mobility   - Keep Call bell within reach  - Keep bed low and locked with side rails adjusted as appropriate  - Keep care items and personal belongings within reach  - Initiate and maintain comfort rounds  - Make Fall Risk Sign visible to staff  - Offer Toileting every 2 Hours, in advance of need  - Initiate/Maintain bed alarm  - Obtain necessary fall risk management equipment: yellow bracelet and socks  - Apply yellow socks and bracelet for high fall risk patients  - Consider moving patient to room near nurses station  Outcome: Progressing  Goal: Maintain or return to baseline ADL function  Description: INTERVENTIONS:  -  Assess patient's ability to carry out ADLs; assess patient's baseline for ADL function and identify physical deficits which impact ability to perform ADLs (bathing, care of mouth/teeth, toileting, grooming, dressing, etc )  - Assess/evaluate cause of self-care deficits   - Assess range of motion  - Assess patient's mobility; develop plan if impaired  - Assess patient's need for assistive devices and provide as appropriate  - Encourage maximum independence but intervene and supervise when necessary  - Involve family in performance of ADLs  - Assess for home care needs following discharge   - Consider OT consult to assist with ADL evaluation and planning for discharge  - Provide patient education as appropriate  Outcome: Progressing  Goal: Maintains/Returns to pre admission functional level  Description: INTERVENTIONS:  - Perform BMAT or MOVE assessment daily    - Set and communicate daily mobility goal to care team and patient/family/caregiver  - Collaborate with rehabilitation services on mobility goals if consulted  - Perform Range of Motion 4 times a day  - Reposition patient every 2 hours    - Dangle patient 4 times a day  - Stand patient 4 times a day  - Ambulate patient 4 times a day  - Out of bed to chair 3 times a day   - Out of bed for meals 3 times a day  - Out of bed for toileting  - Record patient progress and toleration of activity level   Outcome: Progressing     Problem: DISCHARGE PLANNING  Goal: Discharge to home or other facility with appropriate resources  Description: INTERVENTIONS:  - Identify barriers to discharge w/patient and caregiver  - Arrange for needed discharge resources and transportation as appropriate  - Identify discharge learning needs (meds, wound care, etc )  - Arrange for interpretive services to assist at discharge as needed  - Refer to Case Management Department for coordinating discharge planning if the patient needs post-hospital services based on physician/advanced practitioner order or complex needs related to functional status, cognitive ability, or social support system  Outcome: Progressing     Problem: Knowledge Deficit  Goal: Patient/family/caregiver demonstrates understanding of disease process, treatment plan, medications, and discharge instructions  Description: Complete learning assessment and assess knowledge base    Interventions:  - Provide teaching at level of understanding  - Provide teaching via preferred learning methods  Outcome: Progressing     Problem: CARDIOVASCULAR - ADULT  Goal: Maintains optimal cardiac output and hemodynamic stability  Description: INTERVENTIONS:  - Monitor I/O, vital signs and rhythm  - Monitor for S/S and trends of decreased cardiac output  - Administer and titrate ordered vasoactive medications to optimize hemodynamic stability  - Assess quality of pulses, skin color and temperature  - Assess for signs of decreased coronary artery perfusion  - Instruct patient to report change in severity of symptoms  Outcome: Progressing  Goal: Absence of cardiac dysrhythmias or at baseline rhythm  Description: INTERVENTIONS:  - Continuous cardiac monitoring, vital signs, obtain 12 lead EKG if ordered  - Administer antiarrhythmic and heart rate control medications as ordered  - Monitor electrolytes and administer replacement therapy as ordered  Outcome: Progressing     Problem: SKIN/TISSUE INTEGRITY - ADULT  Goal: Skin Integrity remains intact(Skin Breakdown Prevention)  Description: Assess:  -Perform Gurwinder assessment every 8  -Clean and moisturize skin every 2  -Inspect skin when repositioning, toileting, and assisting with ADLS  -Assess under medical devices such as masimo band every 2  -Assess extremities for adequate circulation and sensation     Bed Management:  -Have minimal linens on bed & keep smooth, unwrinkled  -Change linens as needed when moist or perspiring  -Avoid sitting or lying in one position for more than 2 hours while in bed  -Keep HOB at 30 degrees     Toileting:  -Offer bedside commode  -Assess for incontinence every 2  -Use incontinent care products after each incontinent episode such as pads    Activity:  -Mobilize patient 4 times a day  -Encourage activity and walks on unit  -Encourage or provide ROM exercises   -Turn and reposition patient every 2 Hours  -Use appropriate equipment to lift or move patient in bed  -Instruct/ Assist with weight shifting every 30 mins when out of bed in chair  -Consider limitation of chair time 2 hour intervals    Skin Care:  -Avoid use of baby powder, tape, friction and shearing, hot water or constrictive clothing  -Relieve pressure over bony prominences using pillows  -Do not massage red bony areas    Next Steps:  -Teach patient strategies to minimize risks such as weight shifting   -Consider consults to  interdisciplinary teams such as PT  Outcome: Progressing  Goal: Incision(s), wounds(s) or drain site(s) healing without S/S of infection  Description: INTERVENTIONS  - Assess and document dressing, incision, wound bed, drain sites and surrounding tissue  - Provide patient and family education  - Perform skin care/dressing changes every 8  Outcome: Progressing  Goal: Pressure injury heals and does not worsen  Description: Interventions:  - Implement low air loss mattress or specialty surface (Criteria met)  - Apply silicone foam dressing  - Instruct/assist with weight shifting every 30 minutes when in chair   - Limit chair time to 2 hour intervals  - Use special pressure reducing interventions such as pillows when in chair   - Apply fecal or urinary incontinence containment device   - Perform passive or active ROM every 2  - Turn and reposition patient & offload bony prominences every 2 hours   - Utilize friction reducing device or surface for transfers   - Consider consults to  interdisciplinary teams such as PT  - Use incontinent care products after each incontinent episode such as pads  - Consider nutrition services referral as needed  Outcome: Progressing

## 2021-08-04 NOTE — UTILIZATION REVIEW
Initial Clinical Review    Admission: Date/Time/Statement:   Admission Orders (From admission, onward)     Ordered        08/03/21 1302  Inpatient Admission  Once                   Orders Placed This Encounter   Procedures    Inpatient Admission     Standing Status:   Standing     Number of Occurrences:   1     Order Specific Question:   Level of Care     Answer:   Med Surg [16]     Order Specific Question:   Estimated length of stay     Answer:   More than 2 Midnights     Order Specific Question:   Certification     Answer:   I certify that inpatient services are medically necessary for this patient for a duration of greater than two midnights  See H&P and MD Progress Notes for additional information about the patient's course of treatment  ED Arrival Information     Expected Arrival Acuity    8/3/2021  8/3/2021 11:33 Emergent         Means of arrival Escorted by Service Admission type    [facility] Emergency         Arrival complaint    SOb, Fluid pain, AFIB diagnosis        Chief Complaint   Patient presents with    Shortness of Breath     pt c/o increased sob w/lower leg edema and cough for past 2 wks  seen by cardiologist today and sent to ED per rapid afib control  denies pain/travel/fevers/n/v/d       Initial Presentation: 77year old female to the ED from home with complaints of cough, increase in lower extremity edema and dyspnea on exertion for 2 weeks prior to arrival Admitted In June 2021 for esmer, new onset afib  Admitted to inpatient for CHF,   afib, esmer  Arrives tachycardic, decreased breath sounds with bilateral lower extremity edema  Given IV lopressor in the ED with some improvement of heart rate  Cardiology consult  CXR shows: Findings are suspicious for mild pulmonary vascular congestion with trace bilateral effusions  Given IV Lasix  Monitor renal function  Baseline creat 1 2  ON arrival, it is 1 54  CHF could be due to afib due to hyperthyroidism     Date: 8/4 Day 2: Continue to monitor response of IV Lasix  I/O  Cardiology consult:   Persistent afib  Pro BNP elevated  INcrease po Cardizem  Continue Xarelto  Continue with propanolol  Monitor tele  Continue to diurese and monitor kidney function closely  GIven IV and po potassium  Recheck     ED Triage Vitals   Temperature Pulse Respirations Blood Pressure SpO2   08/03/21 1147 08/03/21 1147 08/03/21 1147 08/03/21 1147 08/03/21 1147   98 6 °F (37 °C) (!) 106 22 (!) 155/101 95 %      Temp Source Heart Rate Source Patient Position - Orthostatic VS BP Location FiO2 (%)   08/03/21 1147 08/03/21 1147 08/03/21 1147 08/03/21 1147 --   Temporal Monitor Lying Right arm       Pain Score       08/03/21 1437       No Pain          Wt Readings from Last 1 Encounters:   08/04/21 111 kg (244 lb 9 6 oz)     Additional Vital Signs:   Time Temp Pulse Resp  BP  MAP (mmHg)  SpO2  O2 Device Patient Position - Orthostatic VS   08/04/21 11:03:43 97 3 °F (36 3 °C)Abnormal  117Abnormal  16  137/86  103  93 %  -- --   08/04/21 07:06:32 98 2 °F (36 8 °C) 90 18  122/83  96  94 %  -- --   08/04/21 0628 -- 109Abnormal  --  133/91  --  --  -- --   08/04/21 03:02:53 97 7 °F (36 5 °C) 120Abnormal  16  131/95  107  91 %  -- --   08/04/21 0031 -- 100 --  134/97  --  --  -- --   08/03/21 23:35:37 97 8 °F (36 6 °C) 62 21  131/92  105  95 %  -- --   08/03/21 2012 -- -- --  --  --  93 %  None (Room air) --   08/03/21 20:01:05 97 2 °F (36 2 °C)Abnormal  97 17  135/95  108  92 %  -- --   08/03/21 18:34:35 -- 114Abnormal  --  139/96  110  94 %  -- --   08/03/21 1734 -- 107Abnormal  --  144/98  --  --  -- --   08/03/21 17:33:46 -- 107Abnormal  --  144/98  113  94 %  -- --   08/03/21 1616 -- 98 --  --  --  --  -- --   08/03/21 1535 -- 114Abnormal  --  --  --  --  -- --   08/03/21 1528 -- 118Abnormal  22  --  --  95 %  None (Room air) --   08/03/21 1415 98 4 °F (36 9 °C) 123Abnormal  24Abnormal   132/88  106  95 %  -- --   08/03/21 1400 -- 114Abnormal 24Abnormal   132/107Abnormal   116  94 %  -- --   08/03/21 1345 -- 112Abnormal  22  132/101Abnormal   110  95 %  -- --   08/03/21 1330 -- 116Abnormal  20  118/94  103  94 %  -- --   08/03/21 1300 -- 109Abnormal  22  107/66  78  94 %  None (Room air) Lying   08/03/21 1215 -- 98 24Abnormal   152/88  113  95 %  -- --   08/03/21 1200 -- 107Abnormal  26Abnormal   169/111Abnormal   134  95 %  -- --   08/03/21 1147 98 6 °F (37 °C) 106Abnormal  22  155/101Abnormal   --  95 %  None (Room air) Lying       Pertinent Labs/Diagnostic Test Results:   8/3 EKG: Interpretation:     Interpretation: abnormal     Rate:     ECG rate:  100     ECG rate assessment: tachycardic     Rhythm:     Rhythm: atrial fibrillation     Ectopy:     Ectopy: none     QRS:     QRS axis:  Left     QRS intervals:  Normal   Conduction:     Conduction: normal     ST segments:     ST segments:  Normal   T waves:     T waves: normal    8/3 CXR:   Findings are suspicious for mild pulmonary vascular congestion with trace bilateral effusions         Results from last 7 days   Lab Units 08/04/21  0442 08/03/21  1212   WBC Thousand/uL 9 06 10 03   HEMOGLOBIN g/dL 12 8 13 4   HEMATOCRIT % 38 8 41 8   PLATELETS Thousands/uL 174 193   NEUTROS ABS Thousands/µL 5 69  --          Results from last 7 days   Lab Units 08/04/21  0442 08/03/21  1211   SODIUM mmol/L 146* 146*   POTASSIUM mmol/L 2 9* 4 2   CHLORIDE mmol/L 104 107   CO2 mmol/L 32 28   ANION GAP mmol/L 10 11   BUN mg/dL 18 21   CREATININE mg/dL 1 45* 1 54*   EGFR ml/min/1 73sq m 37 35   CALCIUM mg/dL 8 9 9 0   MAGNESIUM mg/dL 2 0 2 3   PHOSPHORUS mg/dL  --  3 9     Results from last 7 days   Lab Units 08/04/21  0442 08/03/21  1211   AST U/L 21 22   ALT U/L 24 29   ALK PHOS U/L 93 100   TOTAL PROTEIN g/dL 7 1 7 5   ALBUMIN g/dL 3 4* 3 5   TOTAL BILIRUBIN mg/dL 0 90 0 83         Results from last 7 days   Lab Units 08/04/21  0442 08/03/21  1211   GLUCOSE RANDOM mg/dL 91 123       Results from last 7 days   Lab Units 08/03/21  1211   TROPONIN I ng/mL <0 02     Results from last 7 days   Lab Units 08/03/21  1211   TSH 3RD GENERATON uIU/mL 0 025*         Results from last 7 days   Lab Units 08/03/21  1211   NT-PRO BNP pg/mL 6,142*         Results from last 7 days   Lab Units 08/03/21  1212   TOTAL COUNTED  100           ED Treatment:   Medication Administration from 08/03/2021 1127 to 08/03/2021 1433       Date/Time Order Dose Route Action     08/03/2021 1214 furosemide (LASIX) injection 60 mg 60 mg Intravenous Given     08/03/2021 1400 metoprolol (LOPRESSOR) injection 5 mg 5 mg Intravenous Given        Past Medical History:   Diagnosis Date    COPD [facility]chronic obstructive pulmonary disease) ([facility])     High cholesterol     Hypertension     Hyperthyroidism     Persistent atrial fibrillation ([facility])        Admitting Diagnosis: Acute on chronic diastolic heart failure ([facility]) [I50 33]  SOB (shortness of breath) [R06 02]  Atrial fibrillation with RVR ([facility]) [I48 91]  Bilateral lower extremity edema [R60 0]  [facility] with reduced ejection fraction) [facility] [I50 20]  Age/Sex: 77 y o  female  Admission Orders:  SCDS  I/O  Fluid restriction  Tele    Scheduled Medications:  diltiazem, 120 mg, Oral, BID  docusate sodium, 100 mg, Oral, BID  fluticasone, 2 puff, Inhalation, BID  furosemide, 60 mg, Intravenous, BID (diuretic)  magnesium oxide, 400 mg, Oral, BID  methimazole, 10 mg, Oral, Daily  polyethylene glycol, 17 g, Oral, Daily  potassium chloride, 20 mEq, Intravenous, Q2H  propranolol, 160 mg, Oral, Q6H SABINE  rivaroxaban, 15 mg, Oral, Daily With Breakfast      Continuous IV Infusions:     PRN Meds:  acetaminophen, 650 mg, Oral, Q4H PRN  albuterol, 2 puff, Inhalation, Q4H PRN  metoprolol, 5 mg, Intravenous, Q6H PRN  ondansetron, 4 mg, Intravenous, Q6H PRN  oxyCODONE, 5 mg, Oral, Q4H PRN  simethicone, 80 mg, Oral, 4x Daily PRN        IP CONSULT TO NUTRITION SERVICES  IP CONSULT TO CARDIOLOGY    Network Utilization Review Department  ATTENTION: Please call with any questions or concerns to [phone] and carefully listen to the prompts so that you are directed to the right person  All voicemails are confidential   Lorenzo Cloud all requests for admission clinical reviews, approved or denied determinations and any other requests to dedicated fax number below belonging to the campus where the patient is receiving treatment   List of dedicated fax numbers for the Facilities:  [address] DENIALS (Administrative/Medical Necessity) [phone]   [address] (Maternity/NICU/Pediatrics) [phone]   [address] [id number] [address] Lovely Pearson 1481 [address] [place] [address] [phone]

## 2021-08-04 NOTE — ASSESSMENT & PLAN NOTE
Creatinine 1 54 on admission, baseline creatinine is 1 2  Creatinine today 1 59  Transitioned to oral diuresis and will monitor kidney function closely  Recommend monitoring BMP tomorrow to assess renal status prior to discharge

## 2021-08-04 NOTE — ASSESSMENT & PLAN NOTE
Baseline creatinine is 1 2  Today creatinine is 1 45  With the setting of gaining weight, will continue IV diuretics and monitor response  Cardiology consult appreciated

## 2021-08-04 NOTE — ASSESSMENT & PLAN NOTE
Patient was recently admitted into this hospital due to new onset AFib secondary to hyperthyroidism  Continue medication including Xarelto  Continue propanolol in the setting of hyper thyroidism  Patient is status post IV Lopressor in ER  On telemetry, patient's heart rate is time varies-but improving slowly  Cardiology recommendation appreciated

## 2021-08-04 NOTE — ASSESSMENT & PLAN NOTE
Atrial fibrillation and heart rates are difficult to control in the setting of hyperthyroidism  She will be meeting with endocrinology in September  Currently on Methimazole 10mg daily  TSH remains suppressed but is improved from previous admission at 0 025 (previously 0 007) and free T4 1 35 (previously 2 01)  Will continue Propranolol for rate/rhythm control in the setting of hyperthyroidism

## 2021-08-04 NOTE — ASSESSMENT & PLAN NOTE
TSH is still low, free T4 normal-compared to previous admission, patient is condition biochemically improving  Continue methimazole

## 2021-08-04 NOTE — ASSESSMENT & PLAN NOTE
Wt Readings from Last 3 Encounters:   08/04/21 111 kg (244 lb 9 6 oz)   07/02/21 110 kg (242 lb 8 1 oz)   05/12/21 119 kg (262 lb)     Echocardiogram on 6/28/2021 with EF 50%  Upon admission weight was increased to 253 pounds with cardiopulmonary congestion noted on chest xray  NT proBNP 6,142  Was taking Furosemide 20mg daily at home  Had not been weighing self or monitoring salt intake  Volume overload likely related to poorly controlled a fib rates  She is responding well to Furosemide IV BID with -8 3L output since admission  Weight today down 22 pounds since admission  Transitioned to oral Furosemide today  Recommend discharged home tomorrow on Furosemide 40mg daily if she responds to oral medication and labs stable  Continue Propranolol 160mg q 6 hrs  Continue telemetry given diuresis and optimize electrolytes for K+ > 4, Mag > 2  Continue strict I's/O's and standing daily weights

## 2021-08-04 NOTE — CASE MANAGEMENT
Case Management Assessment & Discharge Planning Note    Patient name Karron Apley  Location /-01 MRN [id number]  : 1955 Date 2021       Current Admission Date: 8/3/2021  Current Admission Diagnosis:  Acute heart failure with preserved ejection fraction [facility])   Patient Active Problem List   Diagnosis    Acute pain of right shoulder    Adhesive capsulitis of right shoulder    Persistent atrial fibrillation ([facility] )    Hyperthyroidism    [facility]) ([facility]    Tobacco abuse    Bilateral lower extremity edema    Acute heart failure with preserved ejection fraction ([facility] )    Previous Admission - Discharge Date:21   LOS (days): 1  Geometric Mean LOS (GMLOS) (days): 3 50  Days to GMLOS:2 5 Previous Discharge Diagnosis:  There are no discharge diagnoses documented for the most recent discharge  Risk of Unplanned Readmission Score  Predictive Model Details          14 (Low)  Factor Value    Calculated 2021 12:05 20% Number of active Rx orders 24    Risk of Unplanned Readmission Model 14% Number of ED visits in last six months 2     12% ECG/EKG order present in last 6 months     9% Encounter of ten days or longer in last year present     8% Imaging order present in last 6 months     7% Phosphorous result present     7% Age 77     6% Number of hospitalizations in last year 1     5% Active anticoagulant Rx order present     5% Latest creatinine high (1 45 mg/dL)     3% Charlson Comorbidity Index 2     2% Future appointment scheduled     1% Current length of stay 0 961 days         BUNDLE:      Reason for Referral:    OBJECTIVE:  Pt is a 77y o  year old , white or  [1], female with Congregation preference of Non-Adventism admitted on 8/3/2021 11:41 AM  Pt is admitted to [facility] [id number] at [address] [facility] with complaints of Acute heart failure with preserved ejection fraction ([facility] )     Current admission status: Inpatient  Referral Reason:  (DC planning- pt with HF)    Preferred Pharmacy:   [facility] [address] [facility] [place], [address] [facility] [address]  Phone: [phone] Fax: [phone]    Primary Care Provider: Cayla Lopez DO    Primary Insurance: [facility] REP  Secondary Insurance: [facility] REP     CM met with patient at the bedside,baseline information  was obtained  CM discussed the role of CM in helping the patient develop a discharge plan and assist the patient in carry out their plan  Here with AFIB with HF- IV lasix and cardiology is consulted    ASSESSMENT:  Active Health Care Agents    There are no active Health Care Agents on file  Advance Directives  Does patient have a [address]?: Yes (requested a copy for patient to provide to the hospital)  Does patient have Advance Directives?: Yes (requested a copy for patient to provide to the hospital)       [facility]: [facility]    Readmission Root Cause  30 Day Readmission: No    Patient Information  Mental Status: Alert  During Assessment patient was accompanied by: Not accompanied during assessment  Assessment information provided by[de-identified] Patient  Primary Caregiver: Self  Support Systems: Other- Comment Ex  Restoration, community, neighbor, etc  (Sister and granddaughter)  Home entry access options   Select all that apply : Stairs  Number of steps to enter home : 4  Type of Current Residence: [address] [place] home  Upon entering residence, is there a bedroom on the main floor (no further steps)?: No  A bedroom is located on the following floor levels of residence (select all that apply):: 2nd [address] [facility] [place] which floors of current residence have a bathroom (select all the apply):: 2nd Floor  Number of steps to 2nd floor from main floor: One Flight  Living Arrangements: Lives Alone    Activities of Daily Living Prior to Admission  Functional Status: Independent  Completes ADLs independently?: Yes  Ambulates independently?: Yes  Does patient use assisted devices?: Yes  Assisted Devices (DME) used: Straight Cane  Does patient currently own DME?: Yes  What DME does the patient currently own?: Straight Cane  Does patient have a history of Outpatient Therapy (PT/OT)?: No  Does the patient have a history of Short-Term Rehab?: No  Does patient currently have [facility]?: No         Patient Information Continued  Income Source: Pension/detention  Does patient have prescription coverage?: Yes  Does patient receive dialysis treatments?: No  Does patient have a history of substance abuse?: No  Does patient have a history of Mental Health Diagnosis?: No         Means of Transportation  Means of Transport to Appts[de-identified] Drives Self  In the past 12 months, has lack of transportation kept you from medical appointments or from getting medications?: No  In the past 12 months, has lack of transportation kept you from meetings, work, or from getting things needed for daily living?: No  Was application for public transport provided?: No    DISCHARGE DETAILS:    Discharge planning discussed with[de-identified] pt  Freedom of Choice: Yes    A post acute care recommendation was made by your care team for [facility]  Discussed [place] of Choice with patient  List of agencies given to patient via in person  patient aware the list is custom filtered for them by zip code location and that [facility] post acute providers are designated    - choice is [facility]    Referral was made:  [facility] accepted- face to face initiated and AVS updated       Requested [address]         Is the patient interested in [facility] at discharge?: Yes  [address] requested[de-identified] Physical Therapy, [address] Name[de-identified] [address] [facility] Provider[de-identified] PCP  [address] Needed[de-identified] Heart Failure Management  Homebound Criteria Met[de-identified] Uses an Assist Device (i e  cane, walker, etc)  Supporting Clincal Findings[de-identified] Fatigues Easliy in Short Distances, Dyspnea with Exertion    DME Referral Provided  Referral made for DME?: No            Discharge Destination Plan[de-identified]  ([address] [facility])    [facility]

## 2021-08-04 NOTE — ASSESSMENT & PLAN NOTE
Wt Readings from Last 3 Encounters:   08/04/21 111 kg (244 lb 9 6 oz)   07/02/21 110 kg (242 lb 8 1 oz)   05/12/21 119 kg (262 lb)     Could be secondary to AFib secondary to hyperthyroidism  Recent history shows 15 lb weight gain  Status post IV Lasix 60 mg-continue and monitor response, adjust the dose as per protocol  Monitor renal function  Echocardiogram on last admission shows ejection fraction 50%  Maintain intake and output, standing weight  Cardiology consult appreciated

## 2021-08-04 NOTE — CONSULTS
Consult Cardiology    Gonzalo  1955, 77 y o  female MRN: [id number]    Unit/Bed#: -01 Encounter: [id number]    Attending Provider: Tracy Black MD   Primary Care Provider: Erick Horowitz DO   Date admitted to hospital: 8/3/2021       Consults    Persistent atrial fibrillation [facility])  Assessment & Plan  Patient presented to her PCP office on 6/21/21 with c/o shortness of breath on exertion since May and found to be in new onset A fib with RVR  Outpatient labs - TSH <0 01, BNP 9997, Creatinine 2 1, K+ 5 2  She was taken via ambulance to [address] [place] [address] ER  During admission endocrinology consulted for the suppressed TSH and she was started on Methimazole  She was discharged home on 7/2/2021 with Diltiazem 180mg BID and Propranolol 160 Q 6 hrs  Diltiazem was decreased to 120mg BID by primary care with consultation with her cardiologist on 7/13/2021 due to hypotension  At visit with cardiology outpatient on 8/2/2021 she was found to be in fluid overload with 15 pound weight gain, shortness of breath  She was sent back to [address] [place] [address] ER  At this time heart rates are fairly controlled in 90's-110's with Dilitazem CD 120mg BID and Propranolol 160mg Q 6hrs  TSH minimally improved at 0 025 and Free T4 1 35  Continue telemetry during admission  Continue Lopressor 5mg IV PRN HR > 115  Optimize electrolytes for K+ > 4, Mag > 2  K+ 2 9 this am, will give KCl 40mEq IV x 1 dose and 40mEq PO x 1 dose    Continue Xarelto for anticoagulation        KAELYN (acute kidney injury) ([facility] )  Assessment & Plan  Creatinine 1 54 on admission, baseline creatinine is 1 2  Today Creatinine is 1 45  Continue with diuresis and will monitor kidney function closely    Hyperthyroidism  Assessment & Plan  Atrial fibrillation and heart rates are difficult to control in the setting of hyperthyroidism  She will be meeting with endocrinology in September  Currently on Methimazole 10mg daily  TSH remains suppressed but is improved from previous admission at 0 025 (previously 0 007) and free T4 1 35 (previously 2 01)  Will continue Propranolol for rate/rhythm control in the setting of hyperthyroidism  * Acute heart failure with preserved ejection fraction [facility])  Assessment & Plan  Wt Readings from Last 3 Encounters:   08/04/21 111 kg (244 lb 9 6 oz)   07/02/21 110 kg (242 lb 8 1 oz)   05/12/21 119 kg (262 lb)     Echocardiogram on 6/28/2021 with EF 50%  Upon admission weight was increased to 253 pounds with cardiopulmonary congestion noted on chest xray  NT proBNP 6,142  Was taking Furosemide 20mg daily at home  Had not been weighing self or monitoring salt intake  Volume overload likely related to poorly controlled a fib rates  She is responding well to Furosemide 60mg IV BID with -5L output in 24 hours  Weight today 244 pounds (down 9 pounds)  Will continue IV diuresis at this time with plan to transition to oral possibly tomorrow  Continue Propranolol 160mg q 6 hrs  Continue telemetry given diuresis and optimize electrolytes for K+ > 4, Mag > 2  Continue strict I's/O's and standing daily weights  Physician Requesting Consult: Beverly Rhoades MD    Reason for Consult / Principal Problem: Heart failure, atrial fibrillation with RVR      HPI: Nasir Jiménez is a 77y o  year old female who has a history of COPD, HTN, and newly diagnosed hyperthyroidism and atrial fibrillation with RVR in June 2021 who follows with [facility] cardiology  \[facility]\""u initially presented to [address] [place] [address] ER on 6/23/21 with onset atrial fibrillation with RVR  She had been to her PCP office on 6/21 due to shortness of breath since May  Her labs revealed suppressed TSH, elevated BNP at 9,997, and KAELYN  She was seen by cardiology outpatient on 6/23 however was directed to the ER  She had reported being short of breath with activity since May 2021  During the admission HR's were difficult to control in the setting of hyperthyroidism   Endocrinology was consulted and Methimazole initiated  She was eventually discharged home on 7/2/21 with Diltiazem 180mg BID, Propranolol 160mg 4 times daily, and Xarelto  She saw her primary care provider outpatient on 7/13/21 and BP was noted to be borderline low  They reached out to her cardiology office and were instructed to reduce her Diltiazem dose to 120mg BID  She presented to [address] [place] [address] cardiology on 8/3/21 for hospital follow up and was noted to have 15 pound weight gain, generalized edema, and shortness of breath  She was directed back to the [address] [place] [address] ER  Chest xray confirmed cardiopulmonary congestion  NT proBNP was 6,142, creatinine 1 54  She remained in a fib on telemetry with HR's in 100's  Today she tells me she feels much better than yesterday  She tells me she was feeling short of breath with swelling for the past 2 weeks  She was not weighing herself at home  She was checking BP's and HR's daily  She was not monitoring salt intake  She denies experiencing any chest pain, lightheadedness, or palpitations  At this time she is urinating frequently  She has lost 9 pounds since yesterday  She states that she is able to get up and move around with minimal shortness of breath  Review of Systems   Constitutional: Negative for chills and fever  HENT: Negative for ear pain and sore throat  Eyes: Negative for pain and visual disturbance  Respiratory: Positive for shortness of breath  Negative for cough  Cardiovascular: Positive for leg swelling  Negative for chest pain and palpitations  Gastrointestinal: Negative for abdominal pain and vomiting  Genitourinary: Negative for dysuria and hematuria  Musculoskeletal: Negative for arthralgias and back pain  Skin: Negative for color change and rash  Neurological: Negative for seizures and syncope  All other systems reviewed and are negative         Historical Information     Past Medical History:   Diagnosis Date    [facility]     High cholesterol     Hypertension     Hyperthyroidism     Persistent atrial fibrillation (HCC)      Past Surgical History:   Procedure Laterality Date    APPENDECTOMY      CATARACT EXTRACTION Bilateral      Social History     Substance and Sexual Activity   Alcohol Use Yes    Comment: occasionally     Social History     Substance and Sexual Activity   Drug Use Never     Social History     Tobacco Use   Smoking Status Former Smoker    Packs/day: 0 50    Types: Cigarettes   Smokeless Tobacco Never Used   Tobacco Comment    30 pk yr hx       Family History:   Family History   Problem Relation Age of Onset    Uterine cancer Mother     Lung disease Father     No Known Problems Sister     No Known Problems Sister        Meds/Allergies     current meds:   Current Facility-Administered Medications   Medication Dose Route Frequency    acetaminophen (TYLENOL) tablet 650 mg  650 mg Oral Q4H PRN    albuterol (PROVENTIL HFA,VENTOLIN HFA) inhaler 2 puff  2 puff Inhalation Q4H PRN    diltiazem (CARDIZEM CD) 24 hr capsule 120 mg  120 mg Oral BID    docusate sodium (COLACE) capsule 100 mg  100 mg Oral BID    fluticasone (FLOVENT HFA) 110 MCG/ACT inhaler 2 puff  2 puff Inhalation BID    furosemide (LASIX) injection 60 mg  60 mg Intravenous BID (diuretic)    magnesium oxide (MAG-OX) tablet 400 mg  400 mg Oral BID    methimazole (TAPAZOLE) tablet 10 mg  10 mg Oral Daily    metoprolol (LOPRESSOR) injection 5 mg  5 mg Intravenous Q6H PRN    ondansetron (ZOFRAN) injection 4 mg  4 mg Intravenous Q6H PRN    oxyCODONE (ROXICODONE) IR tablet 5 mg  5 mg Oral Q4H PRN    polyethylene glycol (MIRALAX) packet 17 g  17 g Oral Daily    potassium chloride (K-DUR,KLOR-CON) CR tablet 40 mEq  40 mEq Oral Once    potassium chloride 20 mEq IVPB (premix)  20 mEq Intravenous Q2H    propranolol (INDERAL) tablet 160 mg  160 mg Oral Q6H [address]    rivaroxaban (XARELTO) tablet 15 mg  15 mg Oral Daily With Breakfast    simethicone (MYLICON) chewable tablet 80 mg  80 mg Oral 4x Daily PRN          No Known Allergies    Objective     Vitals: Blood pressure 122/83, pulse 90, temperature 98 2 °F (36 8 °C), resp  rate 18, height 5' 8" (1 727 m), weight 111 kg (244 lb 9 6 oz), SpO2 94 %  , Body mass index is 37 19 kg/m²  Orthostatic Blood Pressures      Most Recent Value   Blood Pressure  122/83 filed at 08/04/2021 0706   Patient Position - Orthostatic VS  Lying filed at 08/03/2021 2893          Systolic (33IAE), MICKIE:884 , Min:107 , PUT:408     Diastolic (47SIF), ULU:88, Min:66, Max:111        Intake/Output Summary (Last 24 hours) at 8/4/2021 0928  Last data filed at 8/4/2021 0542  Gross per 24 hour   Intake 240 ml   Output 5275 ml   Net -5035 ml       Weight (last 2 days)     Date/Time   Weight    08/04/21 0533   111 (244 6)    08/03/21 1500   115 (253 2)    08/03/21 1147   119 (261 69)              Invasive Devices     Peripheral Intravenous Line            Peripheral IV 08/03/21 Left Antecubital <1 day                Physical Exam  Vitals and nursing note reviewed  Constitutional:       General: She is not in acute distress  Appearance: She is well-developed  HENT:      Head: Normocephalic and atraumatic  Eyes:      Conjunctiva/sclera: Conjunctivae normal    Neck:      Vascular: JVD present  No carotid bruit  Cardiovascular:      Rate and Rhythm: Tachycardia present  Rhythm irregular  Heart sounds: Murmur heard  Systolic murmur is present  Comments: Moderate generalized edema  Pulmonary:      Effort: Pulmonary effort is normal  No respiratory distress  Breath sounds: Examination of the right-lower field reveals decreased breath sounds  Examination of the left-lower field reveals decreased breath sounds  Decreased breath sounds present  Abdominal:      Palpations: Abdomen is soft  Tenderness: There is no abdominal tenderness  Musculoskeletal:      Cervical back: Neck supple     Skin: General: Skin is warm and dry  Neurological:      Mental Status: She is alert  Psychiatric:         Mood and Affect: Mood normal          Speech: Speech normal          Behavior: Behavior normal  Behavior is cooperative  Cognition and Memory: Cognition and memory normal               Laboratory Results:    Results from last 7 days   Lab Units 08/03/21  1211   TROPONIN I ng/mL <0 02       CBC with diff:   Results from last 7 days   Lab Units 08/04/21  0442 08/03/21  1212   WBC Thousand/uL 9 06 10 03   HEMOGLOBIN g/dL 12 8 13 4   HEMATOCRIT % 38 8 41 8   MCV fL 92 95   PLATELETS Thousands/uL 174 193   MCH pg 30 4 30 5   MCHC g/dL 33 0 32 1   RDW % 14 9 14 7   MPV fL 10 7 11 0   NRBC AUTO /100 WBCs 0 0         CMP:  Results from last 7 days   Lab Units 08/04/21  0442 08/03/21  1211   POTASSIUM mmol/L 2 9* 4 2   CHLORIDE mmol/L 104 107   CO2 mmol/L 32 28   BUN mg/dL 18 21   CREATININE mg/dL 1 45* 1 54*   CALCIUM mg/dL 8 9 9 0   AST U/L 21 22   ALT U/L 24 29   ALK PHOS U/L 93 100   EGFR ml/min/1 73sq m 37 35       BMP:  Results from last 7 days   Lab Units 08/04/21  0442 08/03/21  1211   POTASSIUM mmol/L 2 9* 4 2   CHLORIDE mmol/L 104 107   CO2 mmol/L 32 28   BUN mg/dL 18 21   CREATININE mg/dL 1 45* 1 54*   CALCIUM mg/dL 8 9 9 0       BNP:  6,142    Magnesium:   Results from last 7 days   Lab Units 08/04/21  0442 08/03/21  1211   MAGNESIUM mg/dL 2 0 2 3       TSH:   0 025 on 8/3/21, free T4 1 35 on 8/3/21      Cardiac testing:     Echocardiogram 6/28/2021: EF 50%  Right atrium moderately dilated  Mild to moderate MR  Mild to moderate TR  PASP 55mmHg  IVC dilation  Imaging: I have personally reviewed pertinent reports  XR chest 2 views    Result Date: 8/3/2021  Narrative: CHEST INDICATION:   worsening peripheral edema  COMPARISON:  6/26/2021 EXAM PERFORMED/VIEWS:  XR CHEST PA & LATERAL  The frontal view was performed utilizing dual energy radiographic technique   FINDINGS: Cardiac silhouette is mildly enlarged Trace bilateral pleural effusions are present  No pneumothorax  No focal airspace consolidation  Pulmonary vasculature appears mildly prominent Osseous structures appear within normal limits for patient age  Impression: Findings are suspicious for mild pulmonary vascular congestion with trace bilateral effusions  Findings concur with the referring clinician's preliminary interpretation already in the patient's electronic health record  Workstation performed: [id number]       EKG reviewed personally: EKG: atrial fibrillation, ventricular rate 100  Telemetry: atrial fibrillation, rates 's (noted to go into 120's with ambulation)      Counseling / Coordination of Care  Total floor / unit time spent today 45 minutes  Greater than 50% of total time was spent with the patient and / or family counseling and / or coordination of care    A description of the counseling / coordination of care: Discussed case with Dr Herminio Enriquez         Code Status: Level 1 - Full Code

## 2021-08-04 NOTE — ASSESSMENT & PLAN NOTE
Potassium decreased to 2 9  Patient is on IV diuretics  Patient is status post supplement-with 40 mEq via p o   And 40 mEq via IV  Recheck BMP and adjust the level as appropriate

## 2021-08-04 NOTE — PROGRESS NOTES
[address]  Progress Note - Sherryjunaid 86 1955, 77 y o  female MRN: [id number]  Unit/Bed#: -01 Encounter: [id number]  Primary Care Provider: Katelyn Flowers DO   Date and time admitted to hospital: 8/3/2021 11:41 AM    Persistent atrial fibrillation [facility])  Assessment & Plan  Patient was recently admitted into this hospital due to new onset AFib secondary to hyperthyroidism  Continue medication including Xarelto  Continue propanolol in the setting of hyper thyroidism  Patient is status post IV Lopressor in ER  On telemetry, patient's heart rate is time varies-but improving slowly  Cardiology recommendation appreciated    * Acute heart failure with preserved ejection fraction ([facility] [address] )  Assessment & Plan  Wt Readings from Last 3 Encounters:   08/04/21 111 kg (244 lb 9 6 oz)   07/02/21 110 kg (242 lb 8 1 oz)   05/12/21 119 kg (262 lb)     Could be secondary to AFib secondary to hyperthyroidism  Recent history shows 15 lb weight gain  Status post IV Lasix 60 mg-continue and monitor response, adjust the dose as per protocol  Monitor renal function  Echocardiogram on last admission shows ejection fraction 50%  Maintain intake and output, standing weight  Cardiology consult appreciated          KAELYN (acute kidney injury) ([facility] [address] )  Assessment & Plan  Baseline creatinine is 1 2  Today creatinine is 1 45  With the setting of gaining weight, will continue IV diuretics and monitor response  Cardiology consult appreciated    Hyperthyroidism  Assessment & Plan  TSH is still low, free T4 normal-compared to previous admission, patient is condition biochemically improving  Continue methimazole    Hypokalemia  Assessment & Plan  Potassium decreased to 2 9  Patient is on IV diuretics  Patient is status post supplement-with 40 mEq via p o   And 40 mEq via IV  Recheck BMP and adjust the level as appropriate          VTE Pharmacologic Prophylaxis: VTE Score: 4 Moderate Risk (Score 3-4) - Pharmacological DVT Prophylaxis Ordered: apixaban (Eliquis)  Patient Centered Rounds: I performed bedside rounds with nursing staff today  Discussions with Specialists or Other Care Team Provider:  Cardiology    Education and Discussions with Family / Patient: With patient  Time Spent for Care: 20 minutes  More than 50% of total time spent on counseling and coordination of care as described above  Current Length of Stay: 1 day(s)  Current Patient Status: Inpatient   Certification Statement: The patient will continue to require additional inpatient hospital stay due to CHF, AFib  Discharge Plan: Anticipate discharge in 24-48 hrs to home  Code Status: Level 1 - Full Code    Subjective:   Seen and evaluated during the round  Patient reports she is feeling much better  Denies any nausea, vomiting  Objective:     Vitals:   Temp (24hrs), Av 7 °F (36 5 °C), Min:97 2 °F (36 2 °C), Max:98 2 °F (36 8 °C)    Temp:  [97 2 °F (36 2 °C)-98 2 °F (36 8 °C)] 98 1 °F (36 7 °C)  HR:  [] 114  Resp:  [16-21] 18  BP: (120-144)/(74-98) 120/74  SpO2:  [91 %-95 %] 93 %  Body mass index is 37 19 kg/m²  Input and Output Summary (last 24 hours): Intake/Output Summary (Last 24 hours) at 2021 1628  Last data filed at 2021 1533  Gross per 24 hour   Intake 720 ml   Output 5925 ml   Net -5205 ml       Physical Exam:   Physical Exam  Vitals and nursing note reviewed  Exam conducted with a chaperone present  Constitutional:       Appearance: She is not diaphoretic  HENT:      Nose: No congestion  Mouth/Throat:      Mouth: Mucous membranes are moist       Pharynx: No oropharyngeal exudate  Eyes:      General: No scleral icterus  Conjunctiva/sclera: Conjunctivae normal       Pupils: Pupils are equal, round, and reactive to light  Cardiovascular:      Rate and Rhythm: Tachycardia present  Heart sounds: No friction rub  No gallop      Pulmonary:      Effort: Pulmonary effort is normal  No respiratory distress  Breath sounds: No stridor  No wheezing, rhonchi or rales  Chest:      Chest wall: No tenderness  Abdominal:      General: Abdomen is flat  Bowel sounds are normal  There is no distension  Palpations: There is no mass  Tenderness: There is no abdominal tenderness  Hernia: No hernia is present  Musculoskeletal:         General: Normal range of motion  Cervical back: Normal range of motion  Lymphadenopathy:      Cervical: No cervical adenopathy  Neurological:      General: No focal deficit present  Mental Status: She is alert  Cranial Nerves: No cranial nerve deficit  Sensory: No sensory deficit  Motor: No weakness  Coordination: Coordination normal    Psychiatric:         Mood and Affect: Mood normal          Additional Data:     Labs:  Results from last 7 days   Lab Units 08/04/21  0442   WBC Thousand/uL 9 06   HEMOGLOBIN g/dL 12 8   HEMATOCRIT % 38 8   PLATELETS Thousands/uL 174   NEUTROS PCT % 62   LYMPHS PCT % 25   MONOS PCT % 12   EOS PCT % 0     Results from last 7 days   Lab Units 08/04/21  0442   SODIUM mmol/L 146*   POTASSIUM mmol/L 2 9*   CHLORIDE mmol/L 104   CO2 mmol/L 32   BUN mg/dL 18   CREATININE mg/dL 1 45*   ANION GAP mmol/L 10   CALCIUM mg/dL 8 9   ALBUMIN g/dL 3 4*   TOTAL BILIRUBIN mg/dL 0 90   ALK PHOS U/L 93   ALT U/L 24   AST U/L 21   GLUCOSE RANDOM mg/dL 91                       Lines/Drains:  Invasive Devices     Peripheral Intravenous Line            Peripheral IV 08/03/21 Left Antecubital 1 day                  Telemetry:    Telemetry Reviewed: Atrial fibrillation  HR averaging 110  Indication for Continued Telemetry Use: Metabolic/electrolyte disturbance with high probability of dysrhythmia           Imaging: No pertinent imaging reviewed      Recent Cultures (last 7 days):         Last 24 Hours Medication List:   Current Facility-Administered Medications   Medication Dose Route Frequency Provider Last Rate    acetaminophen  650 mg Oral Q4H PRN Vipul Connelly MD      albuterol  2 puff Inhalation Q4H PRN Vipul Connelly MD      diltiazem  120 mg Oral BID Vipul Connelly MD      docusate sodium  100 mg Oral BID Vipul Connelly MD      fluticasone  2 puff Inhalation BID Vipul Connelly MD      furosemide  40 mg Intravenous BID (diuretic) LINDA Meyers      magnesium oxide  400 mg Oral BID Vipul Connelly MD      methimazole  10 mg Oral Daily Vipul Connelly MD      metoprolol  5 mg Intravenous Q6H PRN Vipul Connelly MD      ondansetron  4 mg Intravenous Q6H PRN Vipul Connelly MD      oxyCODONE  5 mg Oral Q4H PRN Vipul Connelly MD      polyethylene glycol  17 g Oral Daily MD Francesca Kiddanna Riveraeric [place] ON 8/5/2021] potassium chloride  40 mEq Oral Daily Vipul Connelly MD      propranolol  160 mg Oral Q6H [address] Vipul Connelly MD      rivaroxaban  15 mg Oral Daily With Breakfast Vipul Connelly MD      simethicone  80 mg Oral 4x Daily PRN Vipul Connelly MD          Today, Patient Was Seen By: Vipul Connelly MD    **Please Note: This note may have been constructed using a voice recognition system  **

## 2021-08-05 LAB
ALBUMIN SERPL BCP-MCNC: 3.2 G/DL (ref 3.5–5)
ALP SERPL-CCNC: 93 U/L (ref 46–116)
ALT SERPL W P-5'-P-CCNC: 25 U/L (ref 12–78)
ANION GAP SERPL CALCULATED.3IONS-SCNC: 9 MMOL/L (ref 4–13)
AST SERPL W P-5'-P-CCNC: 23 U/L (ref 5–45)
BILIRUB SERPL-MCNC: 0.82 MG/DL (ref 0.2–1)
BUN SERPL-MCNC: 23 MG/DL (ref 5–25)
CALCIUM ALBUM COR SERPL-MCNC: 9.5 MG/DL (ref 8.3–10.1)
CALCIUM SERPL-MCNC: 8.9 MG/DL (ref 8.3–10.1)
CHLORIDE SERPL-SCNC: 103 MMOL/L (ref 100–108)
CO2 SERPL-SCNC: 32 MMOL/L (ref 21–32)
CREAT SERPL-MCNC: 1.45 MG/DL (ref 0.6–1.3)
GFR SERPL CREATININE-BSD FRML MDRD: 37 ML/MIN/1.73SQ M
GLUCOSE SERPL-MCNC: 97 MG/DL (ref 65–140)
POTASSIUM SERPL-SCNC: 3.3 MMOL/L (ref 3.5–5.3)
PROT SERPL-MCNC: 6.9 G/DL (ref 6.4–8.2)
SODIUM SERPL-SCNC: 144 MMOL/L (ref 136–145)

## 2021-08-05 PROCEDURE — 99232 SBSQ HOSP IP/OBS MODERATE 35: CPT | Performed by: FAMILY MEDICINE

## 2021-08-05 PROCEDURE — 99232 SBSQ HOSP IP/OBS MODERATE 35: CPT | Performed by: INTERNAL MEDICINE

## 2021-08-05 PROCEDURE — 80053 COMPREHEN METABOLIC PANEL: CPT | Performed by: FAMILY MEDICINE

## 2021-08-05 RX ORDER — PROPRANOLOL HYDROCHLORIDE 20 MG/1
160 TABLET ORAL EVERY 6 HOURS SCHEDULED
Status: DISCONTINUED | OUTPATIENT
Start: 2021-08-05 | End: 2021-08-07 | Stop reason: HOSPADM

## 2021-08-05 RX ORDER — POTASSIUM CHLORIDE 20 MEQ/1
40 TABLET, EXTENDED RELEASE ORAL ONCE
Status: COMPLETED | OUTPATIENT
Start: 2021-08-05 | End: 2021-08-05

## 2021-08-05 RX ADMIN — FUROSEMIDE 40 MG: 10 INJECTION, SOLUTION INTRAMUSCULAR; INTRAVENOUS at 06:47

## 2021-08-05 RX ADMIN — FLUTICASONE PROPIONATE 2 PUFF: 110 AEROSOL, METERED RESPIRATORY (INHALATION) at 09:38

## 2021-08-05 RX ADMIN — FUROSEMIDE 40 MG: 10 INJECTION, SOLUTION INTRAMUSCULAR; INTRAVENOUS at 14:46

## 2021-08-05 RX ADMIN — PROPRANOLOL HYDROCHLORIDE 160 MG: 20 TABLET ORAL at 11:48

## 2021-08-05 RX ADMIN — FLUTICASONE PROPIONATE 2 PUFF: 110 AEROSOL, METERED RESPIRATORY (INHALATION) at 22:04

## 2021-08-05 RX ADMIN — POTASSIUM CHLORIDE 40 MEQ: 1500 TABLET, EXTENDED RELEASE ORAL at 09:37

## 2021-08-05 RX ADMIN — RIVAROXABAN 15 MG: 15 TABLET, FILM COATED ORAL at 06:47

## 2021-08-05 RX ADMIN — Medication 400 MG: at 09:37

## 2021-08-05 RX ADMIN — PROPRANOLOL HYDROCHLORIDE 160 MG: 20 TABLET ORAL at 06:47

## 2021-08-05 RX ADMIN — POTASSIUM CHLORIDE 40 MEQ: 1500 TABLET, EXTENDED RELEASE ORAL at 16:41

## 2021-08-05 RX ADMIN — METHIMAZOLE 10 MG: 5 TABLET ORAL at 09:37

## 2021-08-05 RX ADMIN — PROPRANOLOL HYDROCHLORIDE 160 MG: 20 TABLET ORAL at 18:59

## 2021-08-05 RX ADMIN — Medication 400 MG: at 19:00

## 2021-08-05 RX ADMIN — DILTIAZEM HYDROCHLORIDE 120 MG: 120 CAPSULE, COATED, EXTENDED RELEASE ORAL at 09:37

## 2021-08-05 RX ADMIN — PROPRANOLOL HYDROCHLORIDE 160 MG: 20 TABLET ORAL at 00:35

## 2021-08-05 NOTE — PROGRESS NOTES
[address]  Progress Note - Gonzalo 86 1955, 77 y o  female MRN: [id number]  Unit/Bed#: -01 Encounter: [id number]  Primary Care Provider: Joseline Polk DO   Date and time admitted to hospital: 8/3/2021 11:41 AM    Persistent atrial fibrillation [facility])  Assessment & Plan  Patient was recently admitted into this hospital due to new onset AFib secondary to hyperthyroidism  Continue medication including Xarelto  Continue propanolol in the setting of hyper thyroidism  Patient is status post IV Lopressor in ER  On telemetry, patient's heart rate is  varies-but improving slowly  Cardiology recommendation appreciated    * Acute heart failure with preserved ejection fraction ([facility])  Assessment & Plan  Wt Readings from Last 3 Encounters:   08/05/21 109 kg (241 lb 3 2 oz)   07/02/21 110 kg (242 lb 8 1 oz)   05/12/21 119 kg (262 lb)     Could be secondary to AFib secondary to hyperthyroidism  Recent history shows 15 lb weight gain  Status post IV Lasix 60 mg-patient was receiving IV Lasix 40 mg b i d , with the treatment, patient had good response, as per cardiology recommendation, will could continue IV Lasix today, and transition to p o   In a m   Monitor renal function  Echocardiogram on last admission shows ejection fraction 50%  Maintain intake and output, standing weight  Cardiology consult appreciated          KAELYN (acute kidney injury) ([facility] [address] )  Assessment & Plan  Baseline creatinine is 1 2  Today creatinine remained stable  With the setting of gaining weight, will continue IV diuretics and monitor response  Cardiology consult appreciated    Hyperthyroidism  Assessment & Plan  TSH is still low, free T4 normal-compared to previous admission, patient is condition biochemically improving  Continue methimazole    Hypokalemia  Assessment & Plan  Potassium decreased to 2 9  Patient is on IV diuretics  After supplement, patient potassium came back 3 3, will continue supplement          VTE Pharmacologic Prophylaxis: VTE Score: 4 Moderate Risk (Score 3-4) - Pharmacological DVT Prophylaxis Ordered: apixaban (Eliquis)  Patient Centered Rounds: I performed bedside rounds with nursing staff today  Discussions with Specialists or Other Care Team Provider:  Cardiology    Education and Discussions with Family / Patient: With patient  Time Spent for Care: 20 minutes  More than 50% of total time spent on counseling and coordination of care as described above  Current Length of Stay: 2 day(s)  Current Patient Status: Inpatient   Certification Statement: The patient will continue to require additional inpatient hospital stay due to AFib with RVR, CHF  Discharge Plan: Anticipate discharge in 24-48 hrs to home  Code Status: Level 1 - Full Code    Subjective:   Seen and evaluated during the round  No overnight issues  Objective:     Vitals:   Temp (24hrs), Av °F (36 7 °C), Min:97 8 °F (36 6 °C), Max:98 1 °F (36 7 °C)    Temp:  [97 8 °F (36 6 °C)-98 1 °F (36 7 °C)] 97 8 °F (36 6 °C)  HR:  [] 78  Resp:  [18-20] 20  BP: ()/(45-90) 100/50  SpO2:  [93 %] 93 %  Body mass index is 36 67 kg/m²  Input and Output Summary (last 24 hours): Intake/Output Summary (Last 24 hours) at 2021 1508  Last data filed at 2021 1401  Gross per 24 hour   Intake 580 ml   Output 2250 ml   Net -1670 ml       Physical Exam:   Physical Exam  Vitals and nursing note reviewed  Constitutional:       Appearance: She is not diaphoretic  HENT:      Nose: Nose normal  No congestion  Mouth/Throat:      Mouth: Mucous membranes are moist       Pharynx: No oropharyngeal exudate  Eyes:      General: No scleral icterus  Conjunctiva/sclera: Conjunctivae normal       Pupils: Pupils are equal, round, and reactive to light  Cardiovascular:      Heart sounds: No friction rub  No gallop         Comments: Heart rate varies  Pulmonary:      Effort: Pulmonary effort is normal  No respiratory distress  Breath sounds: No stridor  No wheezing, rhonchi or rales  Abdominal:      General: Abdomen is flat  Bowel sounds are normal  There is no distension  Palpations: There is no mass  Tenderness: There is no abdominal tenderness  Hernia: No hernia is present  Musculoskeletal:         General: Normal range of motion  Cervical back: Normal range of motion  Right lower leg: No edema  Left lower leg: No edema  Skin:     General: Skin is warm  Capillary Refill: Capillary refill takes less than 2 seconds  Findings: No lesion  Neurological:      General: No focal deficit present  Mental Status: She is alert and oriented to person, place, and time  Cranial Nerves: No cranial nerve deficit  Sensory: No sensory deficit  Motor: No weakness  Coordination: Coordination normal    Psychiatric:         Mood and Affect: Mood normal          Additional Data:     Labs:  Results from last 7 days   Lab Units 08/04/21  0442   WBC Thousand/uL 9 06   HEMOGLOBIN g/dL 12 8   HEMATOCRIT % 38 8   PLATELETS Thousands/uL 174   NEUTROS PCT % 62   LYMPHS PCT % 25   MONOS PCT % 12   EOS PCT % 0     Results from last 7 days   Lab Units 08/05/21  0440   SODIUM mmol/L 144   POTASSIUM mmol/L 3 3*   CHLORIDE mmol/L 103   CO2 mmol/L 32   BUN mg/dL 23   CREATININE mg/dL 1 45*   ANION GAP mmol/L 9   CALCIUM mg/dL 8 9   ALBUMIN g/dL 3 2*   TOTAL BILIRUBIN mg/dL 0 82   ALK PHOS U/L 93   ALT U/L 25   AST U/L 23   GLUCOSE RANDOM mg/dL 97                       Lines/Drains:  Invasive Devices     Peripheral Intravenous Line            Peripheral IV 08/05/21 Dorsal (posterior); Right Hand <1 day                  Telemetry:    Telemetry Reviewed: AFib with variable heart rates  Indication for Continued Telemetry Use: Arrthymias requiring medical therapy           Imaging: No pertinent imaging reviewed      Recent Cultures (last 7 days):         Last 24 Hours Medication List:   Current Facility-Administered Medications   Medication Dose Route Frequency Provider Last Rate    acetaminophen  650 mg Oral Q4H PRN Tracy Black MD      albuterol  2 puff Inhalation Q4H PRN Tracy Black MD      diltiazem  120 mg Oral BID Tracy Black MD      docusate sodium  100 mg Oral BID Tracy Black MD      fluticasone  2 puff Inhalation BID Tracy Black MD      furosemide  40 mg Intravenous BID (diuretic) LINDA Ochoa      magnesium oxide  400 mg Oral BID Tracy Black MD      methimazole  10 mg Oral Daily Tracy Black MD      metoprolol  5 mg Intravenous Q6H PRN Tracy Black MD      ondansetron  4 mg Intravenous Q6H PRN Tracy Black MD      oxyCODONE  5 mg Oral Q4H PRN Tracy Black MD      polyethylene glycol  17 g Oral Daily Tracy Black MD      potassium chloride  40 mEq Oral Daily Tracy Black MD      potassium chloride  40 mEq Oral Once LINDA Ochoa      propranolol  160 mg Oral Q6H [address] LINDA Ochoa      rivaroxaban  15 mg Oral Daily With Breakfast Tracy Black MD      simethicone  80 mg Oral 4x Daily PRN Tracy Black MD          Today, Patient Was Seen By: Tracy Black MD    **Please Note: This note may have been constructed using a voice recognition system  **

## 2021-08-05 NOTE — PLAN OF CARE
Problem: Potential for Falls  Goal: Patient will remain free of falls  Description: INTERVENTIONS:  - Educate patient/family on patient safety including physical limitations  - Instruct patient to call for assistance with activity   - Consult OT/PT to assist with strengthening/mobility   - Keep Call bell within reach  - Keep bed low and locked with side rails adjusted as appropriate  - Keep care items and personal belongings within reach  - Initiate and maintain comfort rounds  - Make Fall Risk Sign visible to staff  - Offer Toileting every 2 Hours, in advance of need  - Initiate/Maintain bed alarm  - Obtain necessary fall risk management equipment: yellow socks/bracelet  - Apply yellow socks and bracelet for high fall risk patients  - Consider moving patient to room near nurses station  Outcome: Progressing     Problem: INFECTION - ADULT  Goal: Absence or prevention of progression during hospitalization  Description: INTERVENTIONS:  - Assess and monitor for signs and symptoms of infection  - Monitor lab/diagnostic results  - Monitor all insertion sites, i e  indwelling lines, tubes, and drains  - Monitor endotracheal if appropriate and nasal secretions for changes in amount and color  - [place] appropriate cooling/warming therapies per order  - Administer medications as ordered  - Instruct and encourage patient and family to use good hand hygiene technique  - Identify and instruct in appropriate isolation precautions for identified infection/condition  Outcome: Progressing     Problem: SAFETY ADULT  Goal: Patient will remain free of falls  Description: INTERVENTIONS:  - Educate patient/family on patient safety including physical limitations  - Instruct patient to call for assistance with activity   - Consult OT/PT to assist with strengthening/mobility   - Keep Call bell within reach  - Keep bed low and locked with side rails adjusted as appropriate  - Keep care items and personal belongings within reach  - Initiate and maintain comfort rounds  - Make Fall Risk Sign visible to staff  - Offer Toileting every 2 Hours, in advance of need  - Initiate/Maintain bed alarm  - Obtain necessary fall risk management equipment: yellow socks/ bracelet  - Apply yellow socks and bracelet for high fall risk patients  - Consider moving patient to room near nurses station  Outcome: Progressing  Goal: Maintain or return to baseline ADL function  Description: INTERVENTIONS:  -  Assess patient's ability to carry out ADLs; assess patient's baseline for ADL function and identify physical deficits which impact ability to perform ADLs (bathing, care of mouth/teeth, toileting, grooming, dressing, etc )  - Assess/evaluate cause of self-care deficits   - Assess range of motion  - Assess patient's mobility; develop plan if impaired  - Assess patient's need for assistive devices and provide as appropriate  - Encourage maximum independence but intervene and supervise when necessary  - Involve family in performance of ADLs  - Assess for home care needs following discharge   - Consider OT consult to assist with ADL evaluation and planning for discharge  - Provide patient education as appropriate  Outcome: Progressing  Goal: Maintains/Returns to pre admission functional level  Description: INTERVENTIONS:  - Perform BMAT or MOVE assessment daily    - Set and communicate daily mobility goal to care team and patient/family/caregiver  - Collaborate with rehabilitation services on mobility goals if consulted  - Perform Range of Motion 4 times a day  - Reposition patient every 2 hours    - Dangle patient 4 times a day  - Stand patient 4 times a day  - Ambulate patient 4 times a day  - Out of bed to chair 3 times a day   - Out of bed for meals 3 times a day  - Out of bed for toileting  - Record patient progress and toleration of activity level   Outcome: Progressing     Problem: DISCHARGE PLANNING  Goal: Discharge to home or other facility with appropriate resources  Description: INTERVENTIONS:  - Identify barriers to discharge w/patient and caregiver  - Arrange for needed discharge resources and transportation as appropriate  - Identify discharge learning needs (meds, wound care, etc )  - Arrange for interpretive services to assist at discharge as needed  - Refer to Case Management Department for coordinating discharge planning if the patient needs post-hospital services based on physician/advanced practitioner order or complex needs related to functional status, cognitive ability, or social support system  Outcome: Progressing     Problem: Knowledge Deficit  Goal: Patient/family/caregiver demonstrates understanding of disease process, treatment plan, medications, and discharge instructions  Description: Complete learning assessment and assess knowledge base    Interventions:  - Provide teaching at level of understanding  - Provide teaching via preferred learning methods  Outcome: Progressing     Problem: CARDIOVASCULAR - ADULT  Goal: Maintains optimal cardiac output and hemodynamic stability  Description: INTERVENTIONS:  - Monitor I/O, vital signs and rhythm  - Monitor for S/S and trends of decreased cardiac output  - Administer and titrate ordered vasoactive medications to optimize hemodynamic stability  - Assess quality of pulses, skin color and temperature  - Assess for signs of decreased coronary artery perfusion  - Instruct patient to report change in severity of symptoms  Outcome: Progressing  Goal: Absence of cardiac dysrhythmias or at baseline rhythm  Description: INTERVENTIONS:  - Continuous cardiac monitoring, vital signs, obtain 12 lead EKG if ordered  - Administer antiarrhythmic and heart rate control medications as ordered  - Monitor electrolytes and administer replacement therapy as ordered  Outcome: Progressing     Problem: SKIN/TISSUE INTEGRITY - ADULT  Goal: Skin Integrity remains intact(Skin Breakdown Prevention)  Description: Assess:  -Perform Gurwinder assessment every 8  -Clean and moisturize skin every 8  -Inspect skin when repositioning, toileting, and assisting with ADLS  -Assess under medical devices such as Masimo band   -Assess extremities for adequate circulation and sensation     Bed Management:  -Have minimal linens on bed & keep smooth, unwrinkled  -Change linens as needed when moist or perspiring  -Avoid sitting or lying in one position for more than 2 hours while in bed    Toileting:  -Offer bedside commode  -Assess for incontinence every 2  -Use incontinent care products after each incontinent episode such as pads    Activity:  -Mobilize patient 4 times a day  -Encourage activity and walks on unit  -Encourage or provide ROM exercises   -Turn and reposition patient every 2 Hours  -Use appropriate equipment to lift or move patient in bed  -Instruct/ Assist with weight shifting every 2 when out of bed in chair  -Consider limitation of chair time 2 hour intervals    Skin Care:  -Avoid use of baby powder, tape, friction and shearing, hot water or constrictive clothing  -Relieve pressure over bony prominences using pillows  -Do not massage red bony areas    Next Steps:  -Teach patient strategies to minimize risks such as weight shifting  -Consider consults to  interdisciplinary teams such as PT  Outcome: Progressing  Goal: Incision(s), wounds(s) or drain site(s) healing without S/S of infection  Description: INTERVENTIONS  - Assess and document dressing, incision, wound bed, drain sites and surrounding tissue  - Provide patient and family education  - Perform skin care/dressing changes every 8  Outcome: Progressing  Goal: Pressure injury heals and does not worsen  Description: Interventions:  - Implement low air loss mattress or specialty surface (Criteria met)  - Apply silicone foam dressing  - Instruct/assist with weight shifting every 30 minutes when in chair   - Limit chair time to 2 hour intervals  - Use special pressure reducing interventions such as pillows when in chair   - Apply fecal or urinary incontinence containment device   - Turn and reposition patient & offload bony prominences every 2 hours   - Utilize friction reducing device or surface for transfers   - Consider consults to  interdisciplinary teams such as PT  - Use incontinent care products after each incontinent episode such as pads  - Consider nutrition services referral as needed  Outcome: Progressing

## 2021-08-05 NOTE — ASSESSMENT & PLAN NOTE
Potassium decreased to 2 9  Patient is on IV diuretics  After supplement, patient potassium came back 3 3, will continue supplement

## 2021-08-05 NOTE — ASSESSMENT & PLAN NOTE
Patient was recently admitted into this hospital due to new onset AFib secondary to hyperthyroidism  Continue medication including Xarelto  Continue propanolol in the setting of hyper thyroidism  Patient is status post IV Lopressor in ER  On telemetry, patient's heart rate is  varies-but improving slowly  Cardiology recommendation appreciated

## 2021-08-05 NOTE — PLAN OF CARE
Problem: Potential for Falls  Goal: Patient will remain free of falls  Description: INTERVENTIONS:  - Educate patient/family on patient safety including physical limitations  - Instruct patient to call for assistance with activity   - Consult OT/PT to assist with strengthening/mobility   - Keep Call bell within reach  - Keep bed low and locked with side rails adjusted as appropriate  - Keep care items and personal belongings within reach  - Initiate and maintain comfort rounds  - Make Fall Risk Sign visible to staff  - Offer Toileting every   Hours, in advance of need  - Initiate/Maintain   alarm  - Obtain necessary fall risk management equipment:     - Apply yellow socks and bracelet for high fall risk patients  - Consider moving patient to room near nurses station  Outcome: Progressing     Problem: INFECTION - ADULT  Goal: Absence or prevention of progression during hospitalization  Description: INTERVENTIONS:  - Assess and monitor for signs and symptoms of infection  - Monitor lab/diagnostic results  - Monitor all insertion sites, i e  indwelling lines, tubes, and drains  - Monitor endotracheal if appropriate and nasal secretions for changes in amount and color  - [place] appropriate cooling/warming therapies per order  - Administer medications as ordered  - Instruct and encourage patient and family to use good hand hygiene technique  - Identify and instruct in appropriate isolation precautions for identified infection/condition  Outcome: Progressing     Problem: SAFETY ADULT  Goal: Patient will remain free of falls  Description: INTERVENTIONS:  - Educate patient/family on patient safety including physical limitations  - Instruct patient to call for assistance with activity   - Consult OT/PT to assist with strengthening/mobility   - Keep Call bell within reach  - Keep bed low and locked with side rails adjusted as appropriate  - Keep care items and personal belongings within reach  - Initiate and maintain comfort rounds  - Make Fall Risk Sign visible to staff  - Offer Toileting every   Hours, in advance of need  - Initiate/Maintain   alarm  - Obtain necessary fall risk management equipment:   - Apply yellow socks and bracelet for high fall risk patients  - Consider moving patient to room near nurses station  Outcome: Progressing  Goal: Maintain or return to baseline ADL function  Description: INTERVENTIONS:  -  Assess patient's ability to carry out ADLs; assess patient's baseline for ADL function and identify physical deficits which impact ability to perform ADLs (bathing, care of mouth/teeth, toileting, grooming, dressing, etc )  - Assess/evaluate cause of self-care deficits   - Assess range of motion  - Assess patient's mobility; develop plan if impaired  - Assess patient's need for assistive devices and provide as appropriate  - Encourage maximum independence but intervene and supervise when necessary  - Involve family in performance of ADLs  - Assess for home care needs following discharge   - Consider OT consult to assist with ADL evaluation and planning for discharge  - Provide patient education as appropriate  Outcome: Progressing  Goal: Maintains/Returns to pre admission functional level  Description: INTERVENTIONS:  - Perform BMAT or MOVE assessment daily    - Set and communicate daily mobility goal to care team and patient/family/caregiver  - Collaborate with rehabilitation services on mobility goals if consulted  - Perform Range of Motion   times a day  - Reposition patient every   hours  - Dangle patient   times a day  - Stand patient   times a day  - Ambulate patient   times a day  - Out of bed to chair   times a day   - Out of bed for meals    times a day  - Out of bed for toileting  - Record patient progress and toleration of activity level   Outcome: Progressing     Problem: DISCHARGE PLANNING  Goal: Discharge to home or other facility with appropriate resources  Description: INTERVENTIONS:  - Identify barriers to discharge w/patient and caregiver  - Arrange for needed discharge resources and transportation as appropriate  - Identify discharge learning needs (meds, wound care, etc )  - Arrange for interpretive services to assist at discharge as needed  - Refer to Case Management Department for coordinating discharge planning if the patient needs post-hospital services based on physician/advanced practitioner order or complex needs related to functional status, cognitive ability, or social support system  Outcome: Progressing     Problem: Knowledge Deficit  Goal: Patient/family/caregiver demonstrates understanding of disease process, treatment plan, medications, and discharge instructions  Description: Complete learning assessment and assess knowledge base    Interventions:  - Provide teaching at level of understanding  - Provide teaching via preferred learning methods  Outcome: Progressing     Problem: CARDIOVASCULAR - ADULT  Goal: Maintains optimal cardiac output and hemodynamic stability  Description: INTERVENTIONS:  - Monitor I/O, vital signs and rhythm  - Monitor for S/S and trends of decreased cardiac output  - Administer and titrate ordered vasoactive medications to optimize hemodynamic stability  - Assess quality of pulses, skin color and temperature  - Assess for signs of decreased coronary artery perfusion  - Instruct patient to report change in severity of symptoms  Outcome: Progressing  Goal: Absence of cardiac dysrhythmias or at baseline rhythm  Description: INTERVENTIONS:  - Continuous cardiac monitoring, vital signs, obtain 12 lead EKG if ordered  - Administer antiarrhythmic and heart rate control medications as ordered  - Monitor electrolytes and administer replacement therapy as ordered  Outcome: Progressing     Problem: SKIN/TISSUE INTEGRITY - ADULT  Goal: Skin Integrity remains intact(Skin Breakdown Prevention)  Description: Assess:  -Perform Gurwinder assessment every    -Clean and moisturize skin every    -Inspect skin when repositioning, toileting, and assisting with ADLS  -Assess under medical devices such as   every    -Assess extremities for adequate circulation and sensation     Bed Management:  -Have minimal linens on bed & keep smooth, unwrinkled  -Change linens as needed when moist or perspiring  -Avoid sitting or lying in one position for more than   hours while in bed  -Keep HOB at  degrees     Toileting:  -Offer bedside commode  -Assess for incontinence every   -Use incontinent care products after each incontinent episode such as   Activity:  -Mobilize patient   times a day  -Encourage activity and walks on unit  -Encourage or provide ROM exercises   -Turn and reposition patient every   Hours  -Use appropriate equipment to lift or move patient in bed  -Instruct/ Assist with weight shifting every   when out of bed in chair  -Consider limitation of chair time   hour intervals    Skin Care:  -Avoid use of baby powder, tape, friction and shearing, hot water or constrictive clothing  -Relieve pressure over bony prominences using    -Do not massage red bony areas    Next Steps:  -Teach patient strategies to minimize risks such as     -Consider consults to  interdisciplinary teams such as   Outcome: Progressing  Goal: Incision(s), wounds(s) or drain site(s) healing without S/S of infection  Description: INTERVENTIONS  - Assess and document dressing, incision, wound bed, drain sites and surrounding tissue  - Provide patient and family education  - Perform skin care/dressing changes every   Outcome: Progressing  Goal: Pressure injury heals and does not worsen  Description: Interventions:  - Implement low air loss mattress or specialty surface (Criteria met)  - Apply silicone foam dressing  - Instruct/assist with weight shifting every    minutes when in chair   - Limit chair time to   hour intervals  - Use special pressure reducing interventions such as   when in chair   - Apply fecal or urinary incontinence containment device   - Perform passive or active ROM every   - Turn and reposition patient & offload bony prominences every   hours   - Utilize friction reducing device or surface for transfers   - Consider consults to  interdisciplinary teams such as    - Use incontinent care products after each incontinent episode such as      - Consider nutrition services referral as needed  Outcome: Progressing

## 2021-08-05 NOTE — PLAN OF CARE
Problem: Potential for Falls  Goal: Patient will remain free of falls  Description: INTERVENTIONS:  - Educate patient/family on patient safety including physical limitations  - Instruct patient to call for assistance with activity   - Consult OT/PT to assist with strengthening/mobility   - Keep Call bell within reach  - Keep bed low and locked with side rails adjusted as appropriate  - Keep care items and personal belongings within reach  - Initiate and maintain comfort rounds  - Make Fall Risk Sign visible to staff  - Apply yellow socks and bracelet for high fall risk patients  - Consider moving patient to room near nurses station  Outcome: Progressing     Problem: INFECTION - ADULT  Goal: Absence or prevention of progression during hospitalization  Description: INTERVENTIONS:  - Assess and monitor for signs and symptoms of infection  - Monitor lab/diagnostic results  - Monitor all insertion sites, i e  indwelling lines, tubes, and drains  - Monitor endotracheal if appropriate and nasal secretions for changes in amount and color  - [place] appropriate cooling/warming therapies per order  - Administer medications as ordered  - Instruct and encourage patient and family to use good hand hygiene technique  - Identify and instruct in appropriate isolation precautions for identified infection/condition  Outcome: Progressing     Problem: SAFETY ADULT  Goal: Patient will remain free of falls  Description: INTERVENTIONS:  - Educate patient/family on patient safety including physical limitations  - Instruct patient to call for assistance with activity   - Consult OT/PT to assist with strengthening/mobility   - Keep Call bell within reach  - Keep bed low and locked with side rails adjusted as appropriate  - Keep care items and personal belongings within reach  - Initiate and maintain comfort rounds  - Make Fall Risk Sign visible to staff  - Apply yellow socks and bracelet for high fall risk patients  - Consider moving patient to room near nurses station  Outcome: Progressing  Goal: Maintain or return to baseline ADL function  Description: INTERVENTIONS:  -  Assess patient's ability to carry out ADLs; assess patient's baseline for ADL function and identify physical deficits which impact ability to perform ADLs (bathing, care of mouth/teeth, toileting, grooming, dressing, etc )  - Assess/evaluate cause of self-care deficits   - Assess range of motion  - Assess patient's mobility; develop plan if impaired  - Assess patient's need for assistive devices and provide as appropriate  - Encourage maximum independence but intervene and supervise when necessary  - Involve family in performance of ADLs  - Assess for home care needs following discharge   - Consider OT consult to assist with ADL evaluation and planning for discharge  - Provide patient education as appropriate  Outcome: Progressing  Goal: Maintains/Returns to pre admission functional level  Description: INTERVENTIONS:  - Perform BMAT or MOVE assessment daily    - Set and communicate daily mobility goal to care team and patient/family/caregiver     - Collaborate with rehabilitation services on mobility goals if consulted  - Out of bed for toileting  - Record patient progress and toleration of activity level   Outcome: Progressing     Problem: DISCHARGE PLANNING  Goal: Discharge to home or other facility with appropriate resources  Description: INTERVENTIONS:  - Identify barriers to discharge w/patient and caregiver  - Arrange for needed discharge resources and transportation as appropriate  - Identify discharge learning needs (meds, wound care, etc )  - Arrange for interpretive services to assist at discharge as needed  - Refer to Case Management Department for coordinating discharge planning if the patient needs post-hospital services based on physician/advanced practitioner order or complex needs related to functional status, cognitive ability, or social support system  Outcome: Progressing     Problem: Knowledge Deficit  Goal: Patient/family/caregiver demonstrates understanding of disease process, treatment plan, medications, and discharge instructions  Description: Complete learning assessment and assess knowledge base    Interventions:  - Provide teaching at level of understanding  - Provide teaching via preferred learning methods  Outcome: Progressing     Problem: CARDIOVASCULAR - ADULT  Goal: Maintains optimal cardiac output and hemodynamic stability  Description: INTERVENTIONS:  - Monitor I/O, vital signs and rhythm  - Monitor for S/S and trends of decreased cardiac output  - Administer and titrate ordered vasoactive medications to optimize hemodynamic stability  - Assess quality of pulses, skin color and temperature  - Assess for signs of decreased coronary artery perfusion  - Instruct patient to report change in severity of symptoms  Outcome: Progressing  Goal: Absence of cardiac dysrhythmias or at baseline rhythm  Description: INTERVENTIONS:  - Continuous cardiac monitoring, vital signs, obtain 12 lead EKG if ordered  - Administer antiarrhythmic and heart rate control medications as ordered  - Monitor electrolytes and administer replacement therapy as ordered  Outcome: Progressing     Problem: SKIN/TISSUE INTEGRITY - ADULT  Goal: Skin Integrity remains intact(Skin Breakdown Prevention)  Description: Assess:  -Perform Gurwinder assessment every shift  -Clean and moisturize skin every shift  -Inspect skin when repositioning, toileting, and assisting with ADLS  -Assess extremities for adequate circulation and sensation     Bed Management:  -Have minimal linens on bed & keep smooth, unwrinkled  -Change linens as needed when moist or perspiring      Toileting:  -Offer bedside commode      Activity:  -Encourage activity and walks on unit  -Encourage or provide ROM exercises   -Use appropriate equipment to lift or move patient in bed      Skin Care:  -Avoid use of baby powder, tape, friction and shearing, hot water or constrictive clothing    -Do not massage red bony areas      Outcome: Progressing  Goal: Incision(s), wounds(s) or drain site(s) healing without S/S of infection  Description: INTERVENTIONS  - Assess and document dressing, incision, wound bed, drain sites and surrounding tissue  - Provide patient and family education  Outcome: Progressing  Goal: Pressure injury heals and does not worsen  Description: Interventions:  - Implement low air loss mattress or specialty surface (Criteria met)  - Apply silicone foam dressing  - Utilize friction reducing device or surface for transfers     - Consider nutrition services referral as needed  Outcome: Progressing

## 2021-08-05 NOTE — UTILIZATION REVIEW
Inpatient Admission Authorization Request   NOTIFICATION OF INPATIENT ADMISSION/INPATIENT AUTHORIZATION REQUEST   SERVICING FACILITY:   [address] [place] [address]  Laura Cunningham [address] [place] [address], [address]  Tax ID: [id number]  NPI: [id number]  Place of Service: Inpatient [facility]  [address]  Place of Service Code: 24     ATTENDING PROVIDER:  Attending Name and NPI#: Alvy Apley, Md [[id number]]  Address: Laura Cunningham [address] [place] [address], [facility] Daniel Connors  Phone: [phone]     UTILIZATION REVIEW CONTACT:  Martha Olivas Utilization   Network Utilization Review Department  Phone: [phone]  Fax [phone]  Email: [email or web address]     PHYSICIAN ADVISORY SERVICES:  FOR [id number] REVIEW - MEDICAL NECESSITY DENIAL  Phone: [phone]  Fax: [phone]  Email: [email or web address]     TYPE OF REQUEST:  Inpatient Status     ADMISSION INFORMATION:  ADMISSION DATE/TIME: 8/3/21  1:02 PM  PATIENT DIAGNOSIS CODE/DESCRIPTION:  Acute on chronic diastolic heart failure (HCC) [I50 33]  SOB (shortness of breath) [R06 02]  Atrial fibrillation with RVR ([address] ) [I48 91]  Bilateral lower extremity edema [R60 0]  HFrEF (heart failure with reduced ejection fraction) ([address] ) [I50 20]  DISCHARGE DATE/TIME: No discharge date for patient encounter  DISCHARGE DISPOSITION (IF DISCHARGED): Home/Self Care     IMPORTANT INFORMATION:  Please contact the Martha Olivas directly with any questions or concerns regarding this request  Department voicemails are confidential     Send requests for admission clinical reviews, concurrent reviews, approvals, and administrative denials due to lack of clinical to fax [phone]

## 2021-08-05 NOTE — NURSING NOTE
When patient gets up onto the [facility], her HR increases to 150's briefly  When she sits and relaxes it decreases to 100's  Fredy MCKEON made aware

## 2021-08-05 NOTE — PROGRESS NOTES
Progress Note:Cardiology  Corinne Catalina Santamaria 1955, 77 y o  female MRN: [id number]    Unit/Bed#: -01 Encounter: [id number]  Attending Physician: Teresa Small MD   Primary Care Provider: Ani Martin DO   Date admitted to hospital: 8/3/2021  Length of stay: 2         Persistent atrial fibrillation [facility])  Assessment & Plan  Patient presented to her PCP office on 6/21/21 with c/o shortness of breath on exertion since May and found to be in new onset A fib with RVR  Outpatient labs - TSH <0 01, BNP 9997, Creatinine 2 1, K+ 5 2  She was taken via ambulance to [facility] ER  During admission endocrinology consulted for the suppressed TSH and she was started on Methimazole  She was discharged home on 7/2/2021 with Diltiazem 180mg BID and Propranolol 160 Q 6 hrs  Diltiazem was decreased to 120mg BID by primary care with consultation with her cardiologist on 7/13/2021 due to hypotension  At visit with cardiology outpatient on 8/2/2021 she was found to be in fluid overload with 15 pound weight gain, shortness of breath  She was sent back to [facility] ER  At this time heart rates are fairly controlled in 90's-110's with Dilitazem CD 120mg BID and Propranolol 160mg Q 6hrs  TSH minimally improved at 0 025 and Free T4 1 35  Continue telemetry during admission  Continue Lopressor 5mg IV PRN HR > 115  Optimize electrolytes for K+ > 4, Mag > 2  K+ 3 3 this am, will give additional KCL 40mEq PO this afternoon in addition to morning dose    Continue Xarelto for anticoagulation        KAELYN (acute kidney injury) ([facility] [address] )  Assessment & Plan  Creatinine 1 54 on admission, baseline creatinine is 1 2  Today Creatinine is 1 45  Continue with diuresis and will monitor kidney function closely    Hyperthyroidism  Assessment & Plan  Atrial fibrillation and heart rates are difficult to control in the setting of hyperthyroidism  She will be meeting with endocrinology in September  Currently on Methimazole 10mg daily  TSH remains suppressed but is improved from previous admission at 0 025 (previously 0 007) and free T4 1 35 (previously 2 01)  Will continue Propranolol for rate/rhythm control in the setting of hyperthyroidism  * Acute heart failure with preserved ejection fraction [facility])  Assessment & Plan  Wt Readings from Last 3 Encounters:   08/04/21 111 kg (244 lb 9 6 oz)   07/02/21 110 kg (242 lb 8 1 oz)   05/12/21 119 kg (262 lb)     Echocardiogram on 6/28/2021 with EF 50%  Upon admission weight was increased to 253 pounds with cardiopulmonary congestion noted on chest xray  NT proBNP 6,142  Was taking Furosemide 20mg daily at home  Had not been weighing self or monitoring salt intake  Volume overload likely related to poorly controlled a fib rates  She is responding well to Furosemide IV BID with -7L output since admission  Weight today down 20 pounds since admission  Will transition to oral Furosemide tomorrow  Continue Propranolol 160mg q 6 hrs  Continue telemetry given diuresis and optimize electrolytes for K+ > 4, Mag > 2  Continue strict I's/O's and standing daily weights  Subjective:   Patient seen and examined  No significant events overnight  She continues to urinate frequently  She notes improvement in her breathing  No chest pain  Review of Systems   Constitutional: Negative  HENT: Negative  Cardiovascular: Positive for dyspnea on exertion and leg swelling  Negative for chest pain, irregular heartbeat, near-syncope, orthopnea and palpitations  Respiratory: Negative for cough and snoring  Endocrine: Negative  Skin: Negative  Musculoskeletal: Negative  Gastrointestinal: Negative  Genitourinary: Negative  Neurological: Negative  Psychiatric/Behavioral: Negative  Objective:     Vitals: Blood pressure 134/88, pulse 97, temperature 97 9 °F (36 6 °C), resp  rate 18, height 5' 8" (1 727 m), weight 109 kg (241 lb 3 2 oz), SpO2 93 %  , Body mass index is 36 67 kg/m²  , Orthostatic Blood Pressures      Most Recent Value   Blood Pressure  134/88 filed at 08/05/2021 0707   Patient Position - Orthostatic VS  Lying filed at 08/05/2021 5983          Physical Exam  Vitals and nursing note reviewed  Constitutional:       General: She is not in acute distress  Appearance: She is well-developed  HENT:      Head: Normocephalic and atraumatic  Eyes:      Conjunctiva/sclera: Conjunctivae normal    Cardiovascular:      Rate and Rhythm: Normal rate  Rhythm irregular  Heart sounds: No murmur heard  Comments: Trace edema   Pulmonary:      Effort: Pulmonary effort is normal  No respiratory distress  Breath sounds: Normal breath sounds  Abdominal:      Palpations: Abdomen is soft  Tenderness: There is no abdominal tenderness  Musculoskeletal:      Cervical back: Neck supple  Skin:     General: Skin is warm and dry  Neurological:      Mental Status: She is alert              Intake/Output Summary (Last 24 hours) at 8/5/2021 1105  Last data filed at 8/5/2021 1050  Gross per 24 hour   Intake 360 ml   Output 2650 ml   Net -2290 ml       Weight (last 2 days)     Date/Time   Weight    08/05/21 0551   109 (241 2)    08/04/21 0533   111 (244 6)    08/03/21 1500   115 (253 2)    08/03/21 1147   119 (261 69)                 Medications:      Current Facility-Administered Medications:     acetaminophen (TYLENOL) tablet 650 mg, 650 mg, Oral, Q4H PRN, Tay Lenz MD    albuterol (PROVENTIL HFA,VENTOLIN HFA) inhaler 2 puff, 2 puff, Inhalation, Q4H PRN, Tay Lenz MD    diltiazem (CARDIZEM CD) 24 hr capsule 120 mg, 120 mg, Oral, BID, Tay Lenz MD, 120 mg at 08/05/21 1398    docusate sodium (COLACE) capsule 100 mg, 100 mg, Oral, BID, Amitna Lenz MD    fluticasone (FLOVENT HFA) 110 MCG/ACT inhaler 2 puff, 2 puff, Inhalation, BID, Tay Lenz MD, 2 puff at 08/05/21 0938    furosemide (LASIX) injection 40 mg, 40 mg, Intravenous, BID (diuretic), LINDA Piña, 40 mg at 08/05/21 3725    magnesium oxide (MAG-OX) tablet 400 mg, 400 mg, Oral, BID, Luis Adkins MD, 400 mg at 08/05/21 5432    methimazole (TAPAZOLE) tablet 10 mg, 10 mg, Oral, Daily, Luis Adkins MD, 10 mg at 08/05/21 2485    metoprolol (LOPRESSOR) injection 5 mg, 5 mg, Intravenous, Q6H PRN, Luis Adkins MD    ondansetron (ZOFRAN) injection 4 mg, 4 mg, Intravenous, Q6H PRN, Luis Adkins MD    oxyCODONE (ROXICODONE) IR tablet 5 mg, 5 mg, Oral, Q4H PRN, Luis Adkins MD    polyethylene glycol (MIRALAX) packet 17 g, 17 g, Oral, Daily, Aminta Lenz MD    potassium chloride (K-DUR,KLOR-CON) CR tablet 40 mEq, 40 mEq, Oral, Daily, Lola Lenz MD, 40 mEq at 08/05/21 0582    potassium chloride (K-DUR,KLOR-CON) CR tablet 40 mEq, 40 mEq, Oral, Once, LINDA Piña    propranolol (INDERAL) tablet 160 mg, 160 mg, Oral, Q6H [address], Lola Lenz MD, 160 mg at 08/05/21 0916    rivaroxaban (XARELTO) tablet 15 mg, 15 mg, Oral, Daily With Breakfast, Luis Adkins MD, 15 mg at 08/05/21 0647    simethicone (MYLICON) chewable tablet 80 mg, 80 mg, Oral, 4x Daily PRN, Luis Adkins MD     Labs & Results:    Results from last 7 days   Lab Units 08/03/21  1211   TROPONIN I ng/mL <0 02     Results from last 7 days   Lab Units 08/04/21  0442 08/03/21  1212   WBC Thousand/uL 9 06 10 03   HEMOGLOBIN g/dL 12 8 13 4   HEMATOCRIT % 38 8 41 8   PLATELETS Thousands/uL 174 193         Results from last 7 days   Lab Units 08/05/21  0440 08/04/21  1655 08/04/21  0442 08/03/21  1211   POTASSIUM mmol/L 3 3* 3 5 2 9* 4 2   CHLORIDE mmol/L 103 102 104 107   CO2 mmol/L 32 32 32 28   BUN mg/dL 23 20 18 21   CREATININE mg/dL 1 45* 1 49* 1 45* 1 54*   CALCIUM mg/dL 8 9 9 1 8 9 9 0   ALK PHOS U/L 93  --  93 100   ALT U/L 25  --  24 29   AST U/L 23  --  21 22         Results from last 7 days   Lab Units 08/04/21  0442 08/03/21  1211   MAGNESIUM mg/dL 2 0 2 3 Results from last 7 days   Lab Units 08/03/21  1211   NT-PRO BNP pg/mL 6,142*        Telemetry: atrial fibrillation rates 90's-120's, some episodes of 150's overnight  Counseling / Coordination of Care  Total floor / unit time spent today 25 minutes  Greater than 50% of total time was spent with the patient and / or family counseling and / or coordination of care    A description of the counseling / coordination of care: Discussed case with Dr Muriel Burdick

## 2021-08-05 NOTE — NURSING NOTE
Dr Viral Paul  made aware of BP lower than parameters  Lasix was held  Dr  recommended recheck BP in a hour and notify her of the result

## 2021-08-05 NOTE — ASSESSMENT & PLAN NOTE
Baseline creatinine is 1 2  Today creatinine remained stable  With the setting of gaining weight, will continue IV diuretics and monitor response  Cardiology consult appreciated

## 2021-08-05 NOTE — ASSESSMENT & PLAN NOTE
Wt Readings from Last 3 Encounters:   08/05/21 109 kg (241 lb 3 2 oz)   07/02/21 110 kg (242 lb 8 1 oz)   05/12/21 119 kg (262 lb)     Could be secondary to AFib secondary to hyperthyroidism  Recent history shows 15 lb weight gain  Status post IV Lasix 60 mg-patient was receiving IV Lasix 40 mg b i d , with the treatment, patient had good response, as per cardiology recommendation, will could continue IV Lasix today, and transition to p o   In a m   Monitor renal function  Echocardiogram on last admission shows ejection fraction 50%  Maintain intake and output, standing weight  Cardiology consult appreciated

## 2021-08-06 LAB
ANION GAP SERPL CALCULATED.3IONS-SCNC: 4 MMOL/L (ref 4–13)
BUN SERPL-MCNC: 23 MG/DL (ref 5–25)
CALCIUM SERPL-MCNC: 9.1 MG/DL (ref 8.3–10.1)
CHLORIDE SERPL-SCNC: 102 MMOL/L (ref 100–108)
CO2 SERPL-SCNC: 34 MMOL/L (ref 21–32)
CREAT SERPL-MCNC: 1.59 MG/DL (ref 0.6–1.3)
GFR SERPL CREATININE-BSD FRML MDRD: 33 ML/MIN/1.73SQ M
GLUCOSE SERPL-MCNC: 150 MG/DL (ref 65–140)
POTASSIUM SERPL-SCNC: 3.6 MMOL/L (ref 3.5–5.3)
SODIUM SERPL-SCNC: 140 MMOL/L (ref 136–145)

## 2021-08-06 PROCEDURE — 80048 BASIC METABOLIC PNL TOTAL CA: CPT | Performed by: NURSE PRACTITIONER

## 2021-08-06 PROCEDURE — 99232 SBSQ HOSP IP/OBS MODERATE 35: CPT | Performed by: FAMILY MEDICINE

## 2021-08-06 PROCEDURE — 99232 SBSQ HOSP IP/OBS MODERATE 35: CPT | Performed by: INTERNAL MEDICINE

## 2021-08-06 RX ORDER — FUROSEMIDE 40 MG/1
40 TABLET ORAL DAILY
Status: DISCONTINUED | OUTPATIENT
Start: 2021-08-06 | End: 2021-08-07 | Stop reason: HOSPADM

## 2021-08-06 RX ORDER — POTASSIUM CHLORIDE 20 MEQ/1
40 TABLET, EXTENDED RELEASE ORAL 2 TIMES DAILY
Status: DISCONTINUED | OUTPATIENT
Start: 2021-08-06 | End: 2021-08-07 | Stop reason: HOSPADM

## 2021-08-06 RX ADMIN — FLUTICASONE PROPIONATE 2 PUFF: 110 AEROSOL, METERED RESPIRATORY (INHALATION) at 08:31

## 2021-08-06 RX ADMIN — POTASSIUM CHLORIDE 40 MEQ: 1500 TABLET, EXTENDED RELEASE ORAL at 08:30

## 2021-08-06 RX ADMIN — METHIMAZOLE 10 MG: 5 TABLET ORAL at 08:31

## 2021-08-06 RX ADMIN — PROPRANOLOL HYDROCHLORIDE 160 MG: 20 TABLET ORAL at 16:42

## 2021-08-06 RX ADMIN — FUROSEMIDE 40 MG: 40 TABLET ORAL at 08:30

## 2021-08-06 RX ADMIN — Medication 400 MG: at 08:31

## 2021-08-06 RX ADMIN — Medication 400 MG: at 16:42

## 2021-08-06 RX ADMIN — PROPRANOLOL HYDROCHLORIDE 160 MG: 20 TABLET ORAL at 11:36

## 2021-08-06 RX ADMIN — PROPRANOLOL HYDROCHLORIDE 160 MG: 20 TABLET ORAL at 01:07

## 2021-08-06 RX ADMIN — POTASSIUM CHLORIDE 40 MEQ: 1500 TABLET, EXTENDED RELEASE ORAL at 16:49

## 2021-08-06 RX ADMIN — DILTIAZEM HYDROCHLORIDE 120 MG: 120 CAPSULE, COATED, EXTENDED RELEASE ORAL at 08:31

## 2021-08-06 RX ADMIN — FLUTICASONE PROPIONATE 2 PUFF: 110 AEROSOL, METERED RESPIRATORY (INHALATION) at 21:57

## 2021-08-06 RX ADMIN — RIVAROXABAN 15 MG: 15 TABLET, FILM COATED ORAL at 08:31

## 2021-08-06 RX ADMIN — PROPRANOLOL HYDROCHLORIDE 160 MG: 20 TABLET ORAL at 06:21

## 2021-08-06 RX ADMIN — DOCUSATE SODIUM 100 MG: 100 CAPSULE, LIQUID FILLED ORAL at 08:30

## 2021-08-06 NOTE — CASE MANAGEMENT
Case Management Progress Note    Patient name Ramsey Vila  Location /-01 MRN [id number]  : 1955 Date 2021       LOS (days): 3  Geometric Mean LOS (GMLOS) (days): 3 50  Days to GMLOS:0 4        BUNDLE:      OBJECTIVE:  Pt is a 77y o  year old , white or  [1], female with Jainism preference of Non-Hindu admitted on 8/3/2021 11:41 AM  Pt is admitted to [id number] at [address] [facility] with complaints of Acute heart failure with preserved ejection fraction ([address] )   Current admission status: Inpatient  Preferred Pharmacy:   [facility] [id number], [address] [facility]  MAR[place]/ Juni Barr [address] [place] [id number]  Phone: [phone] Fax: [phone]    Primary Care Provider: Cayla Lopez DO    Primary Insurance: Renate Schaefer [facility]  Secondary Insurance:     PROGRESS NOTE:  Per MD patient is not ready for dc-  Anticipate dc tomorrow  Notified [facility] of pending dc tomorrow  Called PCP office and made an appointment for patient---   Note on  Provided patient HF Zone Tool, Education paperwork and Access for Care information  Dc plan is [facility]

## 2021-08-06 NOTE — PROGRESS NOTES
[address]  Progress Note - Sherrye [address] 1955, 77 y o  female MRN: [id number]  Unit/Bed#: -01 Encounter: [id number]  Primary Care Provider: Brian Camacho DO   Date and time admitted to hospital: 8/3/2021 11:41 AM    Persistent atrial fibrillation [facility])  Assessment & Plan  Patient was recently admitted into this hospital due to new onset AFib secondary to hyperthyroidism  Continue medication including Xarelto  Continue propanolol in the setting of hyper thyroidism  Patient is status post IV Lopressor in ER  On telemetry, patient's heart rate is  varies-but improving slowly  Cardiology recommendation appreciated    * Acute heart failure with preserved ejection fraction [facility])  Assessment & Plan  Wt Readings from Last 3 Encounters:   08/06/21 109 kg (239 lb 3 2 oz)   07/02/21 110 kg (242 lb 8 1 oz)   05/12/21 119 kg (262 lb)     Could be secondary to AFib secondary to hyperthyroidism  Recent history shows 15 lb weight gain  Transition to p o  Lasix, monitor next 24 hours-if remained stable, plan is to discharge  Monitor renal function  Echocardiogram on last admission shows ejection fraction 50%  Maintain intake and output, standing weight  Cardiology consult appreciated          KAELYN (acute kidney injury) ([facility] [address] )  Assessment & Plan  Baseline creatinine is 1 2  Today creatinine remained stable  With the setting of gaining weight, will continue IV diuretics and monitor response  Cardiology consult appreciated    Hyperthyroidism  Assessment & Plan  TSH is still low, free T4 normal-compared to previous admission, patient is condition biochemically improving  Continue methimazole    Hypokalemia  Assessment & Plan  Potassium remained stable        VTE Pharmacologic Prophylaxis: VTE Score: 4 Moderate Risk (Score 3-4) - Pharmacological DVT Prophylaxis Ordered: apixaban (Eliquis)  Patient Centered Rounds: I performed bedside rounds with nursing staff today    Discussions with Specialists or Other Care Team Provider:  Cardiology    Education and Discussions with Family / Patient: With patient  Time Spent for Care: 15 minutes  More than 50% of total time spent on counseling and coordination of care as described above  Current Length of Stay: 3 day(s)  Current Patient Status: Inpatient   Certification Statement: The patient will continue to require additional inpatient hospital stay due to CHF, AFib  Discharge Plan: Anticipate discharge tomorrow to home  Code Status: Level 1 - Full Code    Subjective:   Seen and evaluated during the round no overnight issues  Objective:     Vitals:   Temp (24hrs), Av 9 °F (36 6 °C), Min:97 7 °F (36 5 °C), Max:98 °F (36 7 °C)    Temp:  [97 7 °F (36 5 °C)-98 °F (36 7 °C)] 97 7 °F (36 5 °C)  HR:  [] 90  Resp:  [17-18] 17  BP: (104-131)/(53-87) 107/72  SpO2:  [91 %-95 %] 91 %  Body mass index is 36 37 kg/m²  Input and Output Summary (last 24 hours): Intake/Output Summary (Last 24 hours) at 2021 1645  Last data filed at 2021 1509  Gross per 24 hour   Intake 360 ml   Output 1050 ml   Net -690 ml       Physical Exam:   Physical Exam  Vitals and nursing note reviewed  Constitutional:       Appearance: She is not diaphoretic  HENT:      Head: Normocephalic  Nose: No congestion  Mouth/Throat:      Mouth: Mucous membranes are moist       Pharynx: No oropharyngeal exudate  Eyes:      General: No scleral icterus  Conjunctiva/sclera: Conjunctivae normal       Pupils: Pupils are equal, round, and reactive to light  Cardiovascular:      Rate and Rhythm: Tachycardia present  Rhythm irregular  Heart sounds: No gallop  Pulmonary:      Effort: Pulmonary effort is normal  No respiratory distress  Breath sounds: No stridor  No wheezing or rhonchi  Abdominal:      General: Abdomen is flat  Bowel sounds are normal  There is no distension  Palpations: There is no mass  Tenderness:  There is no abdominal tenderness  Hernia: No hernia is present  Musculoskeletal:         General: Normal range of motion  Right lower leg: No edema  Left lower leg: No edema  Skin:     General: Skin is warm  Capillary Refill: Capillary refill takes less than 2 seconds  Findings: No lesion  Neurological:      Mental Status: She is alert  Additional Data:     Labs:  Results from last 7 days   Lab Units 08/04/21  0442   WBC Thousand/uL 9 06   HEMOGLOBIN g/dL 12 8   HEMATOCRIT % 38 8   PLATELETS Thousands/uL 174   NEUTROS PCT % 62   LYMPHS PCT % 25   MONOS PCT % 12   EOS PCT % 0     Results from last 7 days   Lab Units 08/06/21  0953 08/05/21  0440   SODIUM mmol/L 140 144   POTASSIUM mmol/L 3 6 3 3*   CHLORIDE mmol/L 102 103   CO2 mmol/L 34* 32   BUN mg/dL 23 23   CREATININE mg/dL 1 59* 1 45*   ANION GAP mmol/L 4 9   CALCIUM mg/dL 9 1 8 9   ALBUMIN g/dL  --  3 2*   TOTAL BILIRUBIN mg/dL  --  0 82   ALK PHOS U/L  --  93   ALT U/L  --  25   AST U/L  --  23   GLUCOSE RANDOM mg/dL 150* 97                       Lines/Drains:  Invasive Devices     Peripheral Intravenous Line            Peripheral IV 08/05/21 Dorsal (posterior); Right Hand 1 day                  Telemetry:    Telemetry Reviewed: Atrial fibrillation  HR averaging Heart rate varies  Indication for Continued Telemetry Use: No indication for continued use  Will discontinue  Imaging: No pertinent imaging reviewed      Recent Cultures (last 7 days):         Last 24 Hours Medication List:   Current Facility-Administered Medications   Medication Dose Route Frequency Provider Last Rate    acetaminophen  650 mg Oral Q4H PRN Max Bragg MD      albuterol  2 puff Inhalation Q4H PRN Max Bragg MD      diltiazem  120 mg Oral BID Max Bragg MD      docusate sodium  100 mg Oral BID Max Bragg MD      fluticasone  2 puff Inhalation BID Max Bragg MD      furosemide  40 mg Oral Daily Jodie Kwok DO      magnesium oxide  400 mg Oral BID Tracy Agrawal, MD      methimazole  10 mg Oral Daily Tracy Agrawal, MD      metoprolol  5 mg Intravenous Q6H PRN Tracy Agrawal, MD      ondansetron  4 mg Intravenous Q6H PRN Tracy Care, MD      oxyCODONE  5 mg Oral Q4H PRN Tracy Care, MD      polyethylene glycol  17 g Oral Daily Tracy Agrawal, MD      potassium chloride  40 mEq Oral BID Tracy Agrawal, MD      propranolol  160 mg Oral Q6H [address] LINDA Milton      rivaroxaban  15 mg Oral Daily With Breakfast Tracy Agrawal, MD      simethicone  80 mg Oral 4x Daily PRN Tracy Agrawal, MD          Today, Patient Was Seen By: Tracy Agrawal MD    **Please Note: This note may have been constructed using a voice recognition system  **

## 2021-08-06 NOTE — PROGRESS NOTES
Progress Note:Cardiology  Lara Aparicio Hina 1955, 77 y o  female MRN: [id number]    Unit/Bed#: -01 Encounter: [id number]  Attending Physician: Winda Cooks, MD   Primary Care Provider: Alfredo Viera DO   Date admitted to hospital: 8/3/2021  Length of stay: 3         Persistent atrial fibrillation [facility])  Assessment & Plan  Patient presented to her PCP office on 6/21/21 with c/o shortness of breath on exertion since May and found to be in new onset A fib with RVR  Outpatient labs - TSH <0 01, BNP 9997, Creatinine 2 1, K+ 5 2  She was taken via ambulance to [address] [place] [address] ER  During admission endocrinology consulted for the suppressed TSH and she was started on Methimazole  She was discharged home on 7/2/2021 with Diltiazem 180mg BID and Propranolol 160 Q 6 hrs  Diltiazem was decreased to 120mg BID by primary care with consultation with her cardiologist on 7/13/2021 due to hypotension  At visit with cardiology outpatient on 8/2/2021 she was found to be in fluid overload with 15 pound weight gain, shortness of breath  She was sent back to [address] [place] [address] ER  At this time heart rates are fairly controlled in 90's-110's with Dilitazem CD 120mg BID and Propranolol 160mg Q 6hrs  TSH minimally improved at 0 025 and Free T4 1 35    Continue telemetry during admission  Continue Lopressor 5mg IV PRN HR > 115  Optimize electrolytes for K+ > 4, Mag > 2  Continue Xarelto for anticoagulation        KAELYN (acute kidney injury) ([facility] [address] )  Assessment & Plan  Creatinine 1 54 on admission, baseline creatinine is 1 2  Creatinine today 1 59  Transitioned to oral diuresis and will monitor kidney function closely  Recommend monitoring BMP tomorrow to assess renal status prior to discharge    Hyperthyroidism  Assessment & Plan  Atrial fibrillation and heart rates are difficult to control in the setting of hyperthyroidism  She will be meeting with endocrinology in September  Currently on Methimazole 10mg daily  TSH remains suppressed but is improved from previous admission at 0 025 (previously 0 007) and free T4 1 35 (previously 2 01)  Will continue Propranolol for rate/rhythm control in the setting of hyperthyroidism  * Acute heart failure with preserved ejection fraction [facility])  Assessment & Plan  Wt Readings from Last 3 Encounters:   08/04/21 111 kg (244 lb 9 6 oz)   07/02/21 110 kg (242 lb 8 1 oz)   05/12/21 119 kg (262 lb)     Echocardiogram on 6/28/2021 with EF 50%  Upon admission weight was increased to 253 pounds with cardiopulmonary congestion noted on chest xray  NT proBNP 6,142  Was taking Furosemide 20mg daily at home  Had not been weighing self or monitoring salt intake  Volume overload likely related to poorly controlled a fib rates  She is responding well to Furosemide IV BID with -8 3L output since admission  Weight today down 22 pounds since admission  Transitioned to oral Furosemide today  Recommend discharged home tomorrow on Furosemide 40mg daily if she responds to oral medication and labs stable  Continue Propranolol 160mg q 6 hrs  Continue telemetry given diuresis and optimize electrolytes for K+ > 4, Mag > 2  Continue strict I's/O's and standing daily weights  Subjective:   Patient seen and examined  No significant events overnight  Her rates have remained controlled on telemetry  She continues to urinate well and received oral Furosemide in place of IV this morning  She tells me her breathing is greatly improved  She feels her leg swelling is back to "baseline"  She denies chest pain, lightheadedness, palpitations  Review of Systems   Constitutional: Negative  HENT: Negative  Cardiovascular: Positive for dyspnea on exertion and leg swelling  Negative for chest pain, irregular heartbeat, near-syncope, orthopnea, palpitations and syncope  Respiratory: Negative for cough and snoring  Endocrine: Negative  Skin: Negative  Musculoskeletal: Negative  Gastrointestinal: Negative      Genitourinary: Negative  Neurological: Negative  Psychiatric/Behavioral: Negative  Objective:     Vitals: Blood pressure 109/53, pulse (!) 108, temperature 98 °F (36 7 °C), resp  rate 18, height 5' 8" (1 727 m), weight 109 kg (239 lb 3 2 oz), SpO2 93 %  , Body mass index is 36 37 kg/m² ,     Orthostatic Blood Pressures      Most Recent Value   Blood Pressure  109/53 filed at 08/06/2021 1039   Patient Position - Orthostatic VS  Sitting filed at 08/05/2021 1430          Physical Exam  Vitals and nursing note reviewed  Constitutional:       General: She is not in acute distress  Appearance: She is well-developed  She is obese  HENT:      Head: Normocephalic and atraumatic  Eyes:      Conjunctiva/sclera: Conjunctivae normal    Neck:      Vascular: No carotid bruit or JVD  Cardiovascular:      Rate and Rhythm: Normal rate  Rhythm irregular  Heart sounds: No murmur heard  Comments: Trace edema to bilateral lower legs, right >left  Pulmonary:      Effort: Pulmonary effort is normal  No respiratory distress  Breath sounds: Normal breath sounds  Abdominal:      Palpations: Abdomen is soft  Tenderness: There is no abdominal tenderness  Musculoskeletal:      Cervical back: Neck supple  Skin:     General: Skin is warm and dry  Neurological:      Mental Status: She is alert  Psychiatric:         Mood and Affect: Mood normal          Speech: Speech normal          Behavior: Behavior is cooperative           Cognition and Memory: Cognition normal             Intake/Output Summary (Last 24 hours) at 8/6/2021 1123  Last data filed at 8/6/2021 0900  Gross per 24 hour   Intake 580 ml   Output 1150 ml   Net -570 ml       Weight (last 2 days)     Date/Time   Weight    08/06/21 0537   109 (239 2)    08/05/21 0551   109 (241 2)    08/04/21 0533   111 (244 6)                 Medications:      Current Facility-Administered Medications:     acetaminophen (TYLENOL) tablet 650 mg, 650 mg, Oral, Q4H PRN, Robin Osullivan MD    albuterol (PROVENTIL HFA,VENTOLIN HFA) inhaler 2 puff, 2 puff, Inhalation, Q4H PRN, Robin Osullivan MD    diltiazem (CARDIZEM CD) 24 hr capsule 120 mg, 120 mg, Oral, BID, Robin Osullivan MD, 120 mg at 08/06/21 0831    docusate sodium (COLACE) capsule 100 mg, 100 mg, Oral, BID, Robin Osullivan MD, 100 mg at 08/06/21 0830    fluticasone (FLOVENT HFA) 110 MCG/ACT inhaler 2 puff, 2 puff, Inhalation, BID, Robin Osullivan MD, 2 puff at 08/06/21 0831    furosemide (LASIX) tablet 40 mg, 40 mg, Oral, Daily, Crystal Maximomik, DO, 40 mg at 08/06/21 0830    magnesium oxide (MAG-OX) tablet 400 mg, 400 mg, Oral, BID, Robin Osullivan MD, 400 mg at 08/06/21 0831    methimazole (TAPAZOLE) tablet 10 mg, 10 mg, Oral, Daily, Robin Osullivan MD, 10 mg at 08/06/21 0831    metoprolol (LOPRESSOR) injection 5 mg, 5 mg, Intravenous, Q6H PRN, Robin Osullivan MD    ondansetron (ZOFRAN) injection 4 mg, 4 mg, Intravenous, Q6H PRN, Santy Lenz MD    oxyCODONE (ROXICODONE) IR tablet 5 mg, 5 mg, Oral, Q4H PRN, Robin Osullivan MD    polyethylene glycol (MIRALAX) packet 17 g, 17 g, Oral, Daily, Aminta Lenz MD    potassium chloride (K-DUR,KLOR-CON) CR tablet 40 mEq, 40 mEq, Oral, Daily, Aminta Lenz MD, 40 mEq at 08/06/21 0830    propranolol (INDERAL) tablet 160 mg, 160 mg, Oral, Q6H [address], Glorine Delay, CRNP, 160 mg at 08/06/21 3151    rivaroxaban (XARELTO) tablet 15 mg, 15 mg, Oral, Daily With Breakfast, Robin Osullivan MD, 15 mg at 08/06/21 0831    simethicone (MYLICON) chewable tablet 80 mg, 80 mg, Oral, 4x Daily PRN, Robin Osullivan MD     Labs & Results:    Results from last 7 days   Lab Units 08/03/21  1211   TROPONIN I ng/mL <0 02     Results from last 7 days   Lab Units 08/04/21  0442 08/03/21  1212   WBC Thousand/uL 9 06 10 03   HEMOGLOBIN g/dL 12 8 13 4   HEMATOCRIT % 38 8 41 8   PLATELETS Thousands/uL 174 193         Results from last 7 days   Lab Units 08/06/21  3307 08/05/21  0440 08/04/21  1655 08/04/21  0442 08/03/21  1211   POTASSIUM mmol/L 3 6 3 3* 3 5 2 9* 4 2   CHLORIDE mmol/L 102 103 102 104 107   CO2 mmol/L 34* 32 32 32 28   BUN mg/dL 23 23 20 18 21   CREATININE mg/dL 1 59* 1 45* 1 49* 1 45* 1 54*   CALCIUM mg/dL 9 1 8 9 9 1 8 9 9 0   ALK PHOS U/L  --  93  --  93 100   ALT U/L  --  25  --  24 29   AST U/L  --  23  --  21 22         Results from last 7 days   Lab Units 08/04/21  0442 08/03/21  1211   MAGNESIUM mg/dL 2 0 2 3     Results from last 7 days   Lab Units 08/03/21  1211   NT-PRO BNP pg/mL 6,142*        Telemetry: atrial fibrillation rates 90's-100's  No events overnight  Counseling / Coordination of Care  Total floor / unit time spent today 25 minutes  Greater than 50% of total time was spent with the patient and / or family counseling and / or coordination of care    A description of the counseling / coordination of care: Discussed case with Dr Lula De La Cruz

## 2021-08-06 NOTE — PLAN OF CARE
Problem: Potential for Falls  Goal: Patient will remain free of falls  Description: INTERVENTIONS:  - Educate patient/family on patient safety including physical limitations  - Instruct patient to call for assistance with activity   - Consult OT/PT to assist with strengthening/mobility   - Keep Call bell within reach  - Keep bed low and locked with side rails adjusted as appropriate  - Keep care items and personal belongings within reach  - Initiate and maintain comfort rounds  - Make Fall Risk Sign visible to staff  - Offer Toileting every   Hours, in advance of need  - Initiate/Maintain   alarm  - Obtain necessary fall risk management equipment:     - Apply yellow socks and bracelet for high fall risk patients  - Consider moving patient to room near nurses station  8/5/2021 2025 by Manju Vázquez RN  Outcome: Progressing  8/5/2021 1715 by Manju Vázquez RN  Outcome: Progressing     Problem: INFECTION - ADULT  Goal: Absence or prevention of progression during hospitalization  Description: INTERVENTIONS:  - Assess and monitor for signs and symptoms of infection  - Monitor lab/diagnostic results  - Monitor all insertion sites, i e  indwelling lines, tubes, and drains  - Monitor endotracheal if appropriate and nasal secretions for changes in amount and color  - [place] appropriate cooling/warming therapies per order  - Administer medications as ordered  - Instruct and encourage patient and family to use good hand hygiene technique  - Identify and instruct in appropriate isolation precautions for identified infection/condition  8/5/2021 2025 by Manju Vázquez RN  Outcome: Progressing  8/5/2021 1715 by Manju Vázquez RN  Outcome: Progressing     Problem: SAFETY ADULT  Goal: Patient will remain free of falls  Description: INTERVENTIONS:  - Educate patient/family on patient safety including physical limitations  - Instruct patient to call for assistance with activity   - Consult OT/PT to assist with strengthening/mobility   - Keep Call bell within reach  - Keep bed low and locked with side rails adjusted as appropriate  - Keep care items and personal belongings within reach  - Initiate and maintain comfort rounds  - Make Fall Risk Sign visible to staff  - Offer Toileting every   Hours, in advance of need  - Initiate/Maintain   alarm  - Obtain necessary fall risk management equipment:   - Apply yellow socks and bracelet for high fall risk patients  - Consider moving patient to room near nurses station  8/5/2021 2025 by Christina Mercado RN  Outcome: Progressing  8/5/2021 1715 by Christina Mercado RN  Outcome: Progressing  Goal: Maintain or return to baseline ADL function  Description: INTERVENTIONS:  -  Assess patient's ability to carry out ADLs; assess patient's baseline for ADL function and identify physical deficits which impact ability to perform ADLs (bathing, care of mouth/teeth, toileting, grooming, dressing, etc )  - Assess/evaluate cause of self-care deficits   - Assess range of motion  - Assess patient's mobility; develop plan if impaired  - Assess patient's need for assistive devices and provide as appropriate  - Encourage maximum independence but intervene and supervise when necessary  - Involve family in performance of ADLs  - Assess for home care needs following discharge   - Consider OT consult to assist with ADL evaluation and planning for discharge  - Provide patient education as appropriate  8/5/2021 2025 by Christina Mercado RN  Outcome: Progressing  8/5/2021 1715 by Christina Mercado RN  Outcome: Progressing  Goal: Maintains/Returns to pre admission functional level  Description: INTERVENTIONS:  - Perform BMAT or MOVE assessment daily    - Set and communicate daily mobility goal to care team and patient/family/caregiver  - Collaborate with rehabilitation services on mobility goals if consulted  - Perform Range of Motion   times a day  - Reposition patient every   hours  - Dangle patient   times a day  - Stand patient    times a day  - Ambulate patient   times a day  - Out of bed to chair   times a day   - Out of bed for meals   times a day  - Out of bed for toileting  - Record patient progress and toleration of activity level   8/5/2021 2025 by Anna Breaux RN  Outcome: Progressing  8/5/2021 1715 by Anna Breaux RN  Outcome: Progressing     Problem: DISCHARGE PLANNING  Goal: Discharge to home or other facility with appropriate resources  Description: INTERVENTIONS:  - Identify barriers to discharge w/patient and caregiver  - Arrange for needed discharge resources and transportation as appropriate  - Identify discharge learning needs (meds, wound care, etc )  - Arrange for interpretive services to assist at discharge as needed  - Refer to Case Management Department for coordinating discharge planning if the patient needs post-hospital services based on physician/advanced practitioner order or complex needs related to functional status, cognitive ability, or social support system  8/5/2021 2025 by Anna Breaux RN  Outcome: Progressing  8/5/2021 1715 by Anna Breaux RN  Outcome: Progressing     Problem: Knowledge Deficit  Goal: Patient/family/caregiver demonstrates understanding of disease process, treatment plan, medications, and discharge instructions  Description: Complete learning assessment and assess knowledge base    Interventions:  - Provide teaching at level of understanding  - Provide teaching via preferred learning methods  8/5/2021 2025 by Anna Breaux RN  Outcome: Progressing  8/5/2021 1715 by Anna Breaux RN  Outcome: Progressing     Problem: CARDIOVASCULAR - ADULT  Goal: Maintains optimal cardiac output and hemodynamic stability  Description: INTERVENTIONS:  - Monitor I/O, vital signs and rhythm  - Monitor for S/S and trends of decreased cardiac output  - Administer and titrate ordered vasoactive medications to optimize hemodynamic stability  - Assess quality of pulses, skin color and temperature  - Assess for signs of decreased coronary artery perfusion  - Instruct patient to report change in severity of symptoms  8/5/2021 2025 by Kim Guillaume RN  Outcome: Progressing  8/5/2021 1715 by Kim Guillaume RN  Outcome: Progressing  Goal: Absence of cardiac dysrhythmias or at baseline rhythm  Description: INTERVENTIONS:  - Continuous cardiac monitoring, vital signs, obtain 12 lead EKG if ordered  - Administer antiarrhythmic and heart rate control medications as ordered  - Monitor electrolytes and administer replacement therapy as ordered  8/5/2021 2025 by Kim Guillaume RN  Outcome: Progressing  8/5/2021 1715 by Kim Guillaume RN  Outcome: Progressing     Problem: SKIN/TISSUE INTEGRITY - ADULT  Goal: Skin Integrity remains intact(Skin Breakdown Prevention)  Description: Assess:  -Perform Gurwinder assessment every    -Clean and moisturize skin every    -Inspect skin when repositioning, toileting, and assisting with ADLS  -Assess under medical devices such as   every    -Assess extremities for adequate circulation and sensation     Bed Management:  -Have minimal linens on bed & keep smooth, unwrinkled  -Change linens as needed when moist or perspiring  -Avoid sitting or lying in one position for more than   hours while in bed  -Keep HOB at  degrees     Toileting:  -Offer bedside commode  -Assess for incontinence every   -Use incontinent care products after each incontinent episode such as   Activity:  -Mobilize patient   times a day  -Encourage activity and walks on unit  -Encourage or provide ROM exercises   -Turn and reposition patient every   Hours  -Use appropriate equipment to lift or move patient in bed  -Instruct/ Assist with weight shifting every   when out of bed in chair  -Consider limitation of chair time    hour intervals    Skin Care:  -Avoid use of baby powder, tape, friction and shearing, hot water or constrictive clothing  -Relieve pressure over bony prominences using    -Do not massage red bony areas    Next Steps:  -Teach patient strategies to minimize risks such as     -Consider consults to  interdisciplinary teams such as    8/5/2021 2025 by Josh Johnson RN  Outcome: Progressing  8/5/2021 1715 by Josh Johnson RN  Outcome: Progressing  Goal: Incision(s), wounds(s) or drain site(s) healing without S/S of infection  Description: INTERVENTIONS  - Assess and document dressing, incision, wound bed, drain sites and surrounding tissue  - Provide patient and family education  - Perform skin care/dressing changes every   8/5/2021 2025 by Josh Johnson RN  Outcome: Progressing  8/5/2021 1715 by Josh Johnson RN  Outcome: Progressing  Goal: Pressure injury heals and does not worsen  Description: Interventions:  - Implement low air loss mattress or specialty surface (Criteria met)  - Apply silicone foam dressing  - Instruct/assist with weight shifting every   minutes when in chair   - Limit chair time to   hour intervals  - Use special pressure reducing interventions such as   when in chair   - Apply fecal or urinary incontinence containment device   - Perform passive or active ROM every   - Turn and reposition patient & offload bony prominences every   hours   - Utilize friction reducing device or surface for transfers   - Consider consults to  interdisciplinary teams such as    - Use incontinent care products after each incontinent episode such as      - Consider nutrition services referral as needed  8/5/2021 2025 by Josh Johnson RN  Outcome: Progressing  8/5/2021 1715 by Josh Johnson RN  Outcome: Progressing

## 2021-08-06 NOTE — ASSESSMENT & PLAN NOTE
Wt Readings from Last 3 Encounters:   08/06/21 109 kg (239 lb 3 2 oz)   07/02/21 110 kg (242 lb 8 1 oz)   05/12/21 119 kg (262 lb)     Could be secondary to AFib secondary to hyperthyroidism  Recent history shows 15 lb weight gain  Transition to p o   Lasix, monitor next 24 hours-if remained stable, plan is to discharge  Monitor renal function  Echocardiogram on last admission shows ejection fraction 50%  Maintain intake and output, standing weight  Cardiology consult appreciated

## 2021-08-07 VITALS
WEIGHT: 240.3 LBS | BODY MASS INDEX: 36.42 KG/M2 | HEART RATE: 90 BPM | SYSTOLIC BLOOD PRESSURE: 119 MMHG | DIASTOLIC BLOOD PRESSURE: 75 MMHG | OXYGEN SATURATION: 95 % | TEMPERATURE: 97.8 F | HEIGHT: 68 IN | RESPIRATION RATE: 15 BRPM

## 2021-08-07 LAB
ANION GAP SERPL CALCULATED.3IONS-SCNC: 7 MMOL/L (ref 4–13)
BUN SERPL-MCNC: 30 MG/DL (ref 5–25)
CALCIUM SERPL-MCNC: 9.1 MG/DL (ref 8.3–10.1)
CHLORIDE SERPL-SCNC: 103 MMOL/L (ref 100–108)
CO2 SERPL-SCNC: 32 MMOL/L (ref 21–32)
CREAT SERPL-MCNC: 1.54 MG/DL (ref 0.6–1.3)
GFR SERPL CREATININE-BSD FRML MDRD: 35 ML/MIN/1.73SQ M
GLUCOSE SERPL-MCNC: 103 MG/DL (ref 65–140)
POTASSIUM SERPL-SCNC: 4.1 MMOL/L (ref 3.5–5.3)
SODIUM SERPL-SCNC: 142 MMOL/L (ref 136–145)

## 2021-08-07 PROCEDURE — 99239 HOSP IP/OBS DSCHRG MGMT >30: CPT | Performed by: FAMILY MEDICINE

## 2021-08-07 PROCEDURE — 80048 BASIC METABOLIC PNL TOTAL CA: CPT | Performed by: FAMILY MEDICINE

## 2021-08-07 RX ORDER — POTASSIUM CHLORIDE 20 MEQ/1
20 TABLET, EXTENDED RELEASE ORAL DAILY
Qty: 15 TABLET | Refills: 0 | Status: SHIPPED | OUTPATIENT
Start: 2021-08-07 | End: 2021-08-25

## 2021-08-07 RX ORDER — FUROSEMIDE 20 MG/1
40 TABLET ORAL DAILY
Qty: 30 TABLET | Refills: 0 | Status: SHIPPED | OUTPATIENT
Start: 2021-08-07

## 2021-08-07 RX ADMIN — METHIMAZOLE 10 MG: 5 TABLET ORAL at 08:13

## 2021-08-07 RX ADMIN — PROPRANOLOL HYDROCHLORIDE 160 MG: 20 TABLET ORAL at 06:11

## 2021-08-07 RX ADMIN — RIVAROXABAN 15 MG: 15 TABLET, FILM COATED ORAL at 08:12

## 2021-08-07 RX ADMIN — Medication 400 MG: at 08:13

## 2021-08-07 RX ADMIN — FUROSEMIDE 40 MG: 40 TABLET ORAL at 08:13

## 2021-08-07 RX ADMIN — DILTIAZEM HYDROCHLORIDE 120 MG: 120 CAPSULE, COATED, EXTENDED RELEASE ORAL at 08:13

## 2021-08-07 RX ADMIN — POTASSIUM CHLORIDE 40 MEQ: 1500 TABLET, EXTENDED RELEASE ORAL at 08:13

## 2021-08-07 RX ADMIN — PROPRANOLOL HYDROCHLORIDE 160 MG: 20 TABLET ORAL at 00:14

## 2021-08-07 RX ADMIN — FLUTICASONE PROPIONATE 2 PUFF: 110 AEROSOL, METERED RESPIRATORY (INHALATION) at 08:15

## 2021-08-07 NOTE — DISCHARGE INSTRUCTIONS
Acute Kidney Injury   AMBULATORY CARE:   Acute kidney injury (KAELYN) is also called acute kidney failure, or acute renal failure  KAELYN happens when your kidneys suddenly stop working correctly  Normally, the kidneys remove fluid, chemicals, and waste from your blood  These wastes are turned into urine by your kidneys  KAELYN usually happens over hours or days  When you have KAELYN, your kidneys do not remove the waste, chemicals, or extra fluid from your body  A normal amount of urine is not produced  KAELYN is usually temporary, but it may become a chronic kidney condition  Causes of KAELYN:   · Decreased blood flow to the kidney, such as from hypercalcemia (high blood calcium level) or severe heart disease     · A disease or condition that affects the kidneys, such as hypertension (high blood pressure) or diabetes     · A blockage in the kidney or ureter, such as a kidney or bladder stone, enlarged prostate, or tumor    Common symptoms include the following: You may not have any symptoms with early or mild KAELYN  As KAELYN progresses, you may have any of the following:  · Decrease in the amount of urine or no urination    · Swelling in your arms, legs, or feet     · Weakness, drowsiness, or no appetite    · Nausea, flank pain, muscle twitching or muscle cramps    · Itchy skin, or your, breath or body smells like urine    · Behavior changes, confusion, disorientation, or seizures    Call 911 if:   · You have sudden chest pain or trouble breathing  Seek care immediately if:   · Your symptoms get worse  Contact your healthcare provider if:   · Your symptoms return  · Your blood sugar or blood pressure level is not within the range your healthcare provider recommends  · You have questions or concerns about your condition or care  Treatment for KAELYN  depends upon the cause of your acute kidney injury and how severe it is   Usually, KAELYN will be monitored in the hospital  If you have mild KAELYN, you may be able to go home to recover  Your healthcare providers will treat the cause of your KAELYN  You may need IV fluids if your KAELYN was caused by little or no fluid in your body  You may need dialysis to remove waste and extra fluid from your body  Nutrition:  Your healthcare provider may tell you to eat food low in sodium (salt), potassium, phosphorus, or protein  A dietitian can help you plan your meals  Drink liquids as directed: Your healthcare provider may recommend that you drink a certain amount of liquids  This will help your kidneys work better and decrease your risk for dehydration  Ask how much liquid to drink each day and which liquids are best for you  What you can do to manage and prevent KAELYN:   · Monitor and manage other health conditions  such as diabetes, high blood pressure, or heart disease  These conditions increase your risk for acute kidney injury  Take your medicines for these conditions as directed  Also, monitor your blood sugar and blood pressure levels as directed  Contact your healthcare provider if your levels are not in the range he or she says it should be  · Talk to your healthcare provider before you take over-the-counter-medicine  NSAIDs, stomach medicine, or laxatives may harm your kidneys and increase your risk for acute kidney injury  If it is okay to take the medicine, follow the directions on the package  Do not take more than directed  · Tell healthcare providers you have had acute kidney injury  before you get contrast liquid for an x-ray or CT scan  Your healthcare provider may give you medicine to prevent kidney problems caused by the liquid  Follow up with your healthcare provider as directed:  Write down your questions so you remember to ask them during your visits  © Copyright [employer] 2021 Information is for End User's use only and may not be sold, redistributed or otherwise used for commercial purposes   All illustrations and images included in CareNotes® are the copyrighted property of A D A M , Inc  or [address]   The above information is an  only  It is not intended as medical advice for individual conditions or treatments  Talk to your doctor, nurse or pharmacist before following any medical regimen to see if it is safe and effective for you  Heart Failure   AMBULATORY CARE:   Heart failure  is a condition that does not allow your heart to fill or pump properly  Not enough oxygen in your blood gets to your organs and tissues  Fluid may not move through your body properly  Fluid builds up and causes swelling and trouble breathing  This is known as congestive heart failure  Heart failure may start in the left or right ventricle  Heart failure is often caused by damage or injury to your heart  The damage may be caused by other heart problems, diabetes, or high blood pressure  The damage may have also been caused by an infection  Heart failure is a long-term condition that tends to get worse over time  It is important to manage your health to improve your quality of life  Common signs and symptoms:   · Trouble breathing with activity that worsens to trouble breathing at rest    · Shortness of breath while lying flat    · Severe shortness of breath and coughing at night that usually wakes you    · Feeling lightheaded when you stand up    · Purple color around your mouth and nails    · Confusion or anxiety    · Chest pain at night    · Periods of no breathing, then breathing fast    · Lack of energy (often worsened by physical activity), or trouble sleeping    · Swelling in your ankles, legs, or abdomen    · Heartbeat that is fast or not regular    · Fingers and toes feel cool to the touch    Call your local emergency number (911 in the [address] [facility]) if:   · You have any of the following signs of a heart attack:      ?  Squeezing, pressure, or pain in your chest    ? You may  also have any of the following:     § Discomfort or pain in your back, neck, jaw, stomach, or arm    § Shortness of breath    § Nausea or vomiting    § Lightheadedness or a sudden cold sweat      Call your doctor if:   · Your heartbeat is fast, slow, or uneven all the time  · You have symptoms of worsening heart failure:      ? Shortness of breath at rest, at night, or that is getting worse in any way    ? Weight gain of 3 or more pounds (1 4 kg) in a day, or more than your healthcare provider says is okay    ? More swelling in your legs or ankles    ? Abdominal pain or swelling    ? More coughing    ? Loss of appetite    ? Feeling tired all the time    · You feel hopeless or depressed, or you have lost interest in things you used to enjoy  · You often feel worried or afraid  · You have questions or concerns about your condition or care  Treatment for heart failure  may include any of the following:  · Medicines  may be needed to help regulate your heart rhythm  You may also need medicines to lower your blood pressure, and to decrease extra fluids  · Oxygen  may help you breathe easier if your oxygen level is lower than normal  A CPAP machine may be used to keep your airway open while you sleep  · Cardiac rehab  is a program run by specialists who will help you safely strengthen your heart  In the program you will learn about exercise, relaxation, stress management, and heart-healthy nutrition  Cardiac rehab may be recommended if your heart failure is not severe  · Surgery  can be done to implant a pacemaker or another device in your chest to regulate your heart rhythm  Other types of surgery can open blocked heart vessels, replace a damaged heart valve, or remove scar tissue  Manage swelling from extra fluid:   · Elevate (raise) your legs above the level of your heart  This will help with fluid that builds up in your legs or ankles  Elevate your legs as often as possible during the day  Prop your legs on pillows or blankets to keep them elevated comfortably   Try not to stand for long periods of time during the day  Move around to keep your blood circulating  · Limit sodium (salt)  Ask how much sodium you can have each day  Your healthcare provider may give you a limit, such as 2,300 milligrams (mg) a day  Your provider or a dietitian can teach you how to read food labels for the number of mg in a food  He or she can also help you find ways to have less salt  For example, if you add salt to food as you cook, do not add more at the table  · Drink liquids as directed  You may need to limit the amount of liquid you drink within 24 hours  Your healthcare provider will tell you how much liquid to have and which liquids are best for you  He or she may tell you to limit liquid to 1 5 to 2 liters in a day  He or she will also tell you how often to drink liquid throughout the day  · Weigh yourself every morning  Use the same scale, in the same spot  Do this after you use the bathroom, but before you eat or drink  Wear the same type of clothing each time  Write down your weight and call your healthcare provider if you have a sudden weight gain  Swelling and weight gain are signs of fluid buildup  Manage heart failure: Your quality of life may improve with treatment and the following:  · Do not smoke  Nicotine and other chemicals in cigarettes and cigars can cause lung and heart damage  Ask your healthcare provider for information if you currently smoke and need help to quit  E-cigarettes or smokeless tobacco still contain nicotine  Talk to your healthcare provider before you use these products  · Do not drink alcohol or use illegal drugs  Alcohol and drugs can increase your risk for high blood pressure, diabetes, and coronary artery disease  · Eat heart-healthy foods  Heart-healthy foods include fruits, vegetables, lean meat (such as beef, chicken, or pork), and low-fat dairy products  Fatty fish such as salmon and tuna are also heart healthy   Other heart-healthy foods include walnuts, whole-grain breads, beans, and cooked beans  Replace butter and margarine with heart-healthy oils such as olive oil or canola oil  Your provider or a dietitian can help you create heart-healthy meal plans  · Manage any chronic health conditions you have  These include high blood pressure, diabetes, obesity, high cholesterol, metabolic syndrome, and COPD  You will have fewer symptoms if you manage these health conditions  Follow your healthcare provider's recommendations and follow up with him or her regularly  · Maintain a healthy weight  Being overweight can increase your risk for high blood pressure, diabetes, and coronary artery disease  These conditions can make your symptoms worse  Ask your healthcare provider how much you should weigh  Ask him or her to help you create a weight loss plan if you are overweight  · Stay active  Activity can help keep your symptoms from getting worse  Walking is a type of physical activity that helps maintain your strength and improve your mood  Physical activity also helps you manage your weight  Work with your healthcare provider to create an exercise plan that is right for you  · Get vaccines as directed  The flu and pneumonia can be severe for a person who has heart failure  Vaccines protect you from these infections  Get a flu shot every year as soon as it is recommended, usually in September or October  You may also need the pneumonia vaccine  Your healthcare provider can tell you if you need other vaccines, and when to get them  Follow up with your doctor or cardiologist within 7 days or as directed: You may need to return for other tests  You may need home health care  A healthcare provider will monitor your vital signs, weight, and make sure your medicines are working  Write down your questions so you remember to ask them during your visits  Join a support group:  Heart failure can be difficult to manage  It may be helpful to talk with others who have heart failure  You may learn how to better manage your condition or get emotional support  For more information:  · [address]  [place] , [place]   Phone: 4- 434 - [phone]  Web Address: https://[employer]/ [address] 2021 Information is for End User's use only and may not be sold, redistributed or otherwise used for commercial purposes  All illustrations and images included in CareNotes® are the copyrighted property of A D A M , Inc  or [address] [place], [place] [address]   The above information is an  only  It is not intended as medical advice for individual conditions or treatments  Talk to your doctor, nurse or pharmacist before following any medical regimen to see if it is safe and effective for you

## 2021-08-07 NOTE — RESPIRATORY THERAPY NOTE
RT Protocol Note  Karan Santamaria 77 y o  female MRN: [id number]  Unit/Bed#: -01 Encounter: [id number]    Assessment    Principal Problem:    Acute heart failure with preserved ejection fraction (HCC)  Active Problems:    Persistent atrial fibrillation (HCC)    Hyperthyroidism    KAELYN (acute kidney injury) ([place] [address] )    Hypokalemia      Home Pulmonary Medications:  Flovent BID, albuterol MDoi as needed for shortness of breath       Past Medical History:   Diagnosis Date    COPD (chronic obstructive pulmonary disease) ([place] [address] )     High cholesterol     Hypertension     Hyperthyroidism     Persistent atrial fibrillation (HCC)      Social History     Socioeconomic History    Marital status:      Spouse name: None    Number of children: None    Years of education: None    Highest education level: None   Occupational History    None   Tobacco Use    Smoking status: Former Smoker     Packs/day: 0 50     Types: Cigarettes    Smokeless tobacco: Never Used    Tobacco comment: 27 pk yr hx   Vaping Use    Vaping Use: Never used   Substance and Sexual Activity    Alcohol use: Yes     Comment: occasionally    Drug use: Never    Sexual activity: Not Currently     Comment: defer   Other Topics Concern    None   Social History Narrative    None     Social Determinants of Health     Financial Resource Strain:     Difficulty of Paying Living Expenses:    Food Insecurity:     Worried About Running Out of Food in the Last Year:     Ran Out of Food in the Last Year:    Transportation Needs: No Transportation Needs    Lack of Transportation (Medical): No    Lack of Transportation (Non-Medical):  No   Physical Activity:     Days of Exercise per Week:     Minutes of Exercise per Session:    Stress:     Feeling of Stress :    Social Connections:     Frequency of Communication with Friends and Family:     Frequency of Social Gatherings with Friends and Family:     Attends Church Services:     Active Member of Clubs or Organizations:     Attends Club or Organization Meetings:     Marital Status:    Intimate Partner Violence:     Fear of Current or Ex-Partner:     Emotionally Abused:     Physically Abused:     Sexually Abused:        Subjective    Subjective Data: sleeping soundly at time of assessment    Objective    Physical Exam:   Assessment Type: Assess only  General Appearance: Sleeping  Respiratory Pattern: Normal  Chest Assessment: Chest expansion symmetrical  Bilateral Breath Sounds: Diminished, Clear  Cough: None  O2 Device: room air    Vitals:  Blood pressure 119/74, pulse 93, temperature 97 7 °F (36 5 °C), resp  rate 16, height 5' 8" (1 727 m), weight 109 kg (240 lb 6 4 oz), SpO2 93 %  Imaging and other studies: performed greater than three days prior    O2 Device: room air     Plan    Respiratory Plan: Home Bronchodilator Patient pathway        Resp Comments: Pt arrived in ed w/ SINGER and lower leg edema  Pt has COPD  diagnosed by PFTs  Quit smoking in may 2021 but had smoked for 50+ yrs  Is stable on room air and denies being SOB at this time  Takes flovent 110 2Xs daily and prn albuterol inhale, used inhaler 2xs this am prior to comming to the hospital    denies need for prn at this time

## 2021-08-07 NOTE — PLAN OF CARE
Problem: Potential for Falls  Goal: Patient will remain free of falls  Description: INTERVENTIONS:  - Educate patient/family on patient safety including physical limitations  - Instruct patient to call for assistance with activity   - Consult OT/PT to assist with strengthening/mobility   - Keep Call bell within reach  - Keep bed low and locked with side rails adjusted as appropriate  - Keep care items and personal belongings within reach  - Initiate and maintain comfort rounds  - Make Fall Risk Sign visible to staff  - Apply yellow socks and bracelet for high fall risk patients  - Consider moving patient to room near nurses station  Outcome: Progressing     Problem: INFECTION - ADULT  Goal: Absence or prevention of progression during hospitalization  Description: INTERVENTIONS:  - Assess and monitor for signs and symptoms of infection  - Monitor lab/diagnostic results  - Monitor all insertion sites, i e  indwelling lines, tubes, and drains  - Monitor endotracheal if appropriate and nasal secretions for changes in amount and color  - [place] appropriate cooling/warming therapies per order  - Administer medications as ordered  - Instruct and encourage patient and family to use good hand hygiene technique  - Identify and instruct in appropriate isolation precautions for identified infection/condition  Outcome: Progressing     Problem: SAFETY ADULT  Goal: Patient will remain free of falls  Description: INTERVENTIONS:  - Educate patient/family on patient safety including physical limitations  - Instruct patient to call for assistance with activity   - Consult OT/PT to assist with strengthening/mobility   - Keep Call bell within reach  - Keep bed low and locked with side rails adjusted as appropriate  - Keep care items and personal belongings within reach  - Initiate and maintain comfort rounds  - Make Fall Risk Sign visible to staff  - Apply yellow socks and bracelet for high fall risk patients  - Consider moving patient to room near nurses station  Outcome: Progressing  Goal: Maintain or return to baseline ADL function  Description: INTERVENTIONS:  -  Assess patient's ability to carry out ADLs; assess patient's baseline for ADL function and identify physical deficits which impact ability to perform ADLs (bathing, care of mouth/teeth, toileting, grooming, dressing, etc )  - Assess/evaluate cause of self-care deficits   - Assess range of motion  - Assess patient's mobility; develop plan if impaired  - Assess patient's need for assistive devices and provide as appropriate  - Encourage maximum independence but intervene and supervise when necessary  - Involve family in performance of ADLs  - Assess for home care needs following discharge   - Consider OT consult to assist with ADL evaluation and planning for discharge  - Provide patient education as appropriate  Outcome: Progressing  Goal: Maintains/Returns to pre admission functional level  Description: INTERVENTIONS:  - Perform BMAT or MOVE assessment daily    - Set and communicate daily mobility goal to care team and patient/family/caregiver     - Collaborate with rehabilitation services on mobility goals if consulted  - Out of bed for toileting  - Record patient progress and toleration of activity level   Outcome: Progressing     Problem: DISCHARGE PLANNING  Goal: Discharge to home or other facility with appropriate resources  Description: INTERVENTIONS:  - Identify barriers to discharge w/patient and caregiver  - Arrange for needed discharge resources and transportation as appropriate  - Identify discharge learning needs (meds, wound care, etc )  - Arrange for interpretive services to assist at discharge as needed  - Refer to Case Management Department for coordinating discharge planning if the patient needs post-hospital services based on physician/advanced practitioner order or complex needs related to functional status, cognitive ability, or social support system  Outcome: Progressing     Problem: Knowledge Deficit  Goal: Patient/family/caregiver demonstrates understanding of disease process, treatment plan, medications, and discharge instructions  Description: Complete learning assessment and assess knowledge base    Interventions:  - Provide teaching at level of understanding  - Provide teaching via preferred learning methods  Outcome: Progressing     Problem: CARDIOVASCULAR - ADULT  Goal: Maintains optimal cardiac output and hemodynamic stability  Description: INTERVENTIONS:  - Monitor I/O, vital signs and rhythm  - Monitor for S/S and trends of decreased cardiac output  - Administer and titrate ordered vasoactive medications to optimize hemodynamic stability  - Assess quality of pulses, skin color and temperature  - Assess for signs of decreased coronary artery perfusion  - Instruct patient to report change in severity of symptoms  Outcome: Progressing  Goal: Absence of cardiac dysrhythmias or at baseline rhythm  Description: INTERVENTIONS:  - Continuous cardiac monitoring, vital signs, obtain 12 lead EKG if ordered  - Administer antiarrhythmic and heart rate control medications as ordered  - Monitor electrolytes and administer replacement therapy as ordered  Outcome: Progressing     Problem: SKIN/TISSUE INTEGRITY - ADULT  Goal: Skin Integrity remains intact(Skin Breakdown Prevention)  Description: Assess:  -Perform Gurwinder assessment every shift  -Clean and moisturize skin every shift  -Inspect skin when repositioning, toileting, and assisting with ADLS  -Assess extremities for adequate circulation and sensation     Bed Management:  -Have minimal linens on bed & keep smooth, unwrinkled  -Change linens as needed when moist or perspiring      Activity:  -Encourage activity and walks on unit  -Encourage or provide ROM exercises   -Use appropriate equipment to lift or move patient in bed    Skin Care:  -Avoid use of baby powder, tape, friction and shearing, hot water or constrictive clothing  -Do not massage red bony areas    Outcome: Progressing  Goal: Incision(s), wounds(s) or drain site(s) healing without S/S of infection  Description: INTERVENTIONS  - Assess and document dressing, incision, wound bed, drain sites and surrounding tissue  - Provide patient and family education  Outcome: Progressing  Goal: Pressure injury heals and does not worsen  Description: Interventions:  - Implement low air loss mattress or specialty surface (Criteria met)  - Apply silicone foam dressing  - Apply fecal or urinary incontinence containment device   - Utilize friction reducing device or surface for transfers     - Consider nutrition services referral as needed  Outcome: Progressing

## 2021-08-07 NOTE — DISCHARGE SUMMARY
[address]  [facility] 1955, 77 y o  female MRN: [id number]  Unit/Bed#: -01 Encounter: [id number]  Primary Care Provider: Cristina Taylor DO   Date and time admitted to hospital: 8/3/2021 11:41 AM    Persistent atrial fibrillation [facility])  Assessment & Plan  Patient was recently admitted into this hospital due to new onset AFib secondary to hyperthyroidism  Continue medication including Xarelto  Continue propanolol in the setting of hyper thyroidism  On telemetry, patient's heart rate is  varies-but improving   Cardiology recommendation appreciated    * Acute heart failure with preserved ejection fraction (HCC)  Assessment & Plan  Wt Readings from Last 3 Encounters:   08/07/21 109 kg (240 lb 4 8 oz)   07/02/21 110 kg (242 lb 8 1 oz)   05/12/21 119 kg (262 lb)     Could be secondary to AFib secondary to hyperthyroidism  Recent history shows 15 lb weight gain  Patient diuresed almost -8 3 L, lost about 22 lb of weight-on discharge date, weight is 240 lb  Renal function is 1 54  Patient Lasix dose increased to 40 mg daily  Echocardiogram on last admission shows ejection fraction 50%  Cardiology consult appreciated  Patient be discharged home with 40 mg of Lasix, and resuming all her home medication    Patient also needs to follow-up with PCP and Cardiology as soon as possible with repeat BMP          KAELYN (acute kidney injury) ([facility] [address] )  Assessment & Plan  Baseline creatinine is 1 2  Creatinine came down to 1 54, patient will need to monitor BMP in 2-3 days since patient was taking Lasix 40 mg  Cardiology consult appreciated    Hyperthyroidism  Assessment & Plan  TSH is still low, free T4 normal-compared to previous admission, patient is condition biochemically improving  Continue methimazole    Hypokalemia  Assessment & Plan  Potassium remained stable        Medical Problems     Resolved Problems  Date Reviewed: 8/6/2021    None              Discharging Physician / Practitioner: Radha Dobbins MD  PCP: Godfrey Muller DO  Admission Date:   Admission Orders (From admission, onward)     Ordered        08/03/21 1302  Inpatient Admission  Once                   Discharge Date: 08/07/21    Consultations During Hospital Stay:  · Cardiology    Procedures Performed:   XR chest 2 views   ED Interpretation by Martin Osullivan DO (08/03 1300)   Possible mild pulmonary congestion  No other acute cardiopulmonary abnormalities noted  Final Result by Kenton Alejandra MD (08/03 1700)      Findings are suspicious for mild pulmonary vascular congestion with trace bilateral effusions  Findings concur with the referring clinician's preliminary interpretation already in the patient's electronic health record  Workstation performed: [id number]         ·   ·     Significant Findings / Test Results:   Lab Results   Component Value Date    WBC 9 06 08/04/2021    HGB 12 8 08/04/2021    HCT 38 8 08/04/2021    MCV 92 08/04/2021     08/04/2021   ·   Lab Results   Component Value Date    SODIUM 142 08/07/2021    K 4 1 08/07/2021     08/07/2021    CO2 32 08/07/2021    AGAP 7 08/07/2021    BUN 30 (H) 08/07/2021    CREATININE 1 54 (H) 08/07/2021    GLUC 103 08/07/2021    GLUF 115 (H) 07/06/2021    CALCIUM 9 1 08/07/2021    AST 23 08/05/2021    ALT 25 08/05/2021    ALKPHOS 93 08/05/2021    TP 6 9 08/05/2021    TBILI 0 82 08/05/2021    EGFR 35 08/07/2021   ·   ·     Incidental Findings:   · As mentioned above     Test Results Pending at Discharge (will require follow up): · None     Outpatient Tests Requested:  · BMP in 2-3 days    Complications:  None    Reason for Admission:  Shortness of breath    Hospital Course: Palak Whyte is a 77 y o  female patient who originally presented to the hospital on 8/3/2021 due to shortness of breath secondary to acute heart failure with preserved ejection fraction    Patient is start receiving IV diuresis  With the treatment, patient diuresed -8 3 L, patient weight down about 22 lb, on discharge date, patient's with remained 240 lb  Cardiology consulted, she agrees with the treatment plan  During the hospitalization, patient potassium dropped to 2 9, which improved with supplement  On discharge date, potassium remained stable  Patient also found KAELYN, with baseline creatinine 1 2  On admission creatinine was 1 54  On discharge date, creatinine remained 1 54  Patient advised to recheck BMP in 2-3 days and follow-up with PCP and Cardiology    Patient's Lasix dose increased to 40 mg daily  Patient advised to follow-up with PCP and Cardiology as soon as possible  Please see above list of diagnoses and related plan for additional information  Condition at Discharge: stable    Discharge Day Visit / Exam:   Subjective:  Seen and evaluated during the round  Patient denies any significant complaint  Vitals: Blood Pressure: 119/75 (08/07/21 0606)  Pulse: 90 (08/07/21 0606)  Temperature: 97 8 °F (36 6 °C) (08/07/21 0606)  Temp Source: Oral (08/05/21 1430)  Respirations: 15 (08/07/21 0606)  Height: 5' 8" (172 7 cm) (08/03/21 1500)  Weight - Scale: 109 kg (240 lb 4 8 oz) (08/07/21 0532)  SpO2: 95 % (08/07/21 0606)  Exam:   Physical Exam  Vitals and nursing note reviewed  Constitutional:       Appearance: She is not diaphoretic  HENT:      Head: Normocephalic  Nose: No congestion  Mouth/Throat:      Mouth: Mucous membranes are moist       Pharynx: No oropharyngeal exudate  Eyes:      General: No scleral icterus  Conjunctiva/sclera: Conjunctivae normal       Pupils: Pupils are equal, round, and reactive to light  Cardiovascular:      Rate and Rhythm: Normal rate  Heart sounds: No gallop  Pulmonary:      Effort: Pulmonary effort is normal  No respiratory distress  Breath sounds: No stridor  Abdominal:      General: Abdomen is flat   Bowel sounds are normal  There is no distension  Palpations: There is no mass  Hernia: No hernia is present  Musculoskeletal:         General: Normal range of motion  Cervical back: Normal range of motion  Right lower leg: No edema  Left lower leg: No edema  Lymphadenopathy:      Cervical: No cervical adenopathy  Skin:     General: Skin is warm  Capillary Refill: Capillary refill takes less than 2 seconds  Findings: No lesion  Neurological:      General: No focal deficit present  Mental Status: She is alert and oriented to person, place, and time  Cranial Nerves: No cranial nerve deficit  Sensory: No sensory deficit  Motor: No weakness  Coordination: Coordination normal    Psychiatric:         Mood and Affect: Mood normal          Discussion with Family: With patient  Discharge instructions/Information to patient and family:   See after visit summary for information provided to patient and family  Provisions for Follow-Up Care:  See after visit summary for information related to follow-up care and any pertinent home health orders  Disposition:   Home    Planned Readmission:  If condition get worse     Discharge Statement:  I spent >35 minutes discharging the patient  This time was spent on the day of discharge  I had direct contact with the patient on the day of discharge  Greater than 50% of the total time was spent examining patient, answering all patient questions, arranging and discussing plan of care with patient as well as directly providing post-discharge instructions  Additional time then spent on discharge activities  Discharge Medications:  See after visit summary for reconciled discharge medications provided to patient and/or family        **Please Note: This note may have been constructed using a voice recognition system**

## 2021-08-07 NOTE — ASSESSMENT & PLAN NOTE
Patient was recently admitted into this hospital due to new onset AFib secondary to hyperthyroidism  Continue medication including Xarelto  Continue propanolol in the setting of hyper thyroidism  On telemetry, patient's heart rate is  varies-but improving   Cardiology recommendation appreciated

## 2021-08-07 NOTE — ASSESSMENT & PLAN NOTE
Wt Readings from Last 3 Encounters:   08/07/21 109 kg (240 lb 4 8 oz)   07/02/21 110 kg (242 lb 8 1 oz)   05/12/21 119 kg (262 lb)     Could be secondary to AFib secondary to hyperthyroidism  Recent history shows 15 lb weight gain  Patient diuresed almost -8 3 L, lost about 22 lb of weight-on discharge date, weight is 240 lb  Renal function is 1 54  Patient Lasix dose increased to 40 mg daily  Echocardiogram on last admission shows ejection fraction 50%  Cardiology consult appreciated  Patient be discharged home with 40 mg of Lasix, and resuming all her home medication    Patient also needs to follow-up with PCP and Cardiology as soon as possible with repeat BMP

## 2021-08-07 NOTE — CASE MANAGEMENT
Case Management Discharge Planning Note    Patient name Jonnie Green  Location /-01 MRN [id number]  : 1955 Date 2021       Current Admission Date: 8/3/2021  Current Admission Diagnosis:  Acute heart failure with preserved ejection fraction [facility])   Patient Active Problem List   Diagnosis    Acute pain of right shoulder    Adhesive capsulitis of right shoulder    Persistent atrial fibrillation ([facility] [address] )    Hyperthyroidism    [facility]    Tobacco abuse    Bilateral lower extremity edema    Acute heart failure with preserved ejection fraction ([facility] )    Hypokalemia    Previous Admission - Discharge Date:21   LOS (days): 4  Geometric Mean LOS (GMLOS) (days): 3 50  Days to GMLOS:-0 3 Previous Discharge Diagnosis:  There are no discharge diagnoses documented for the most recent discharge  Risk of Unplanned Readmission Score  Predictive Model Details          17 (Low)  Factor Value    Calculated 2021 08:04 18% Number of active Rx orders 25    Risk of Unplanned Readmission Model 12% Number of ED visits in last six months 2     10% ECG/EKG order present in last 6 months     8% Latest BUN high (30 mg/dL)     8% Encounter of ten days or longer in last year present     8% Diagnosis of electrolyte disorder present     7% Imaging order present in last 6 months     6% Age 77     5% Number of hospitalizations in last year 1     5% Active anticoagulant Rx order present     5% Latest creatinine high (1 54 mg/dL)     4% Current length of stay 3 793 days     2% Charlson Comorbidity Index 2     2% Future appointment scheduled         BUNDLE:      OBJECTIVE:  Pt is a 77y o  year old , white or  [1], female with Yazidi preference of [facility] admitted on 8/3/2021 11:41 AM  Pt is admitted to [facility] [id number] at [facility] with complaints of Acute heart failure with preserved ejection fraction ([facility] [address] )     Current admission status: Inpatient  Preferred Pharmacy:   [facility] [address] [id number]  Phone: [phone] Fax: [phone]    Primary Care Provider: Guille Brink DO    Primary Insurance: Haritha Arrednodo [facility] REP  Secondary Insurance:     DISCHARGE DETAILS:    Discharge planning discussed with[de-identified] pt  Freedom of Choice: Yes         Requested [address]         Is the patient interested in Daysi Harrell at discharge?: Yes  [address] requested[de-identified] Physical Therapy, [address] Name[de-identified] [address] [facility] Provider[de-identified] PCP  Home Health Services Needed[de-identified] Heart Failure Management  Homebound Criteria Met[de-identified] Uses an Assist Device (i e  cane, walker, etc)  Supporting Clincal Findings[de-identified] Fatigues Easliy in Short Distances, Dyspnea with Exertion    DME Referral Provided  Referral made for DME?: No              Discharge Destination Plan[de-identified]  ([address] [employer])       Anticipate dc today/  CM was unaware of discharge today  CM spoke to patient at the bedside, reviewed DC IMM with patient and informed that patient can stay an additional 4 hours for reconsidering appealing the discharge as the medicare rights were review on the day of discharge  Pt verbalized understanding and feels ready to go home and does not intend to stay 4 hours to reconsider  Pt is aware [facility] is set up, and will be in contact with her within 48 hours post dc  PCP appointment was made

## 2021-08-07 NOTE — ASSESSMENT & PLAN NOTE
Baseline creatinine is 1 2  Creatinine came down to 1 54, patient will need to monitor BMP in 2-3 days since patient was taking Lasix 40 mg  Cardiology consult appreciated

## 2021-08-07 NOTE — PLAN OF CARE
Problem: Potential for Falls  Goal: Patient will remain free of falls  Description: INTERVENTIONS:  - Educate patient/family on patient safety including physical limitations  - Instruct patient to call for assistance with activity   - Consult OT/PT to assist with strengthening/mobility   - Keep Call bell within reach  - Keep bed low and locked with side rails adjusted as appropriate  - Keep care items and personal belongings within reach  - Initiate and maintain comfort rounds  - Make Fall Risk Sign visible to staff  - Offer Toileting everyHours, in advance of need  - Initiate/Maintain alarm  - Obtain necessary fall risk management equipment:  - Apply yellow socks and bracelet for high fall risk patients  - Consider moving patient to room near nurses station  Outcome: Progressing

## 2021-08-09 LAB
QRS AXIS: -60 DEGREES
QRSD INTERVAL: 108 MS
QT INTERVAL: 386 MS
QTC INTERVAL: 497 MS
T WAVE AXIS: 62 DEGREES
VENTRICULAR RATE: 100 BPM

## 2021-08-09 PROCEDURE — 93010 ELECTROCARDIOGRAM REPORT: CPT | Performed by: INTERNAL MEDICINE

## 2021-08-09 NOTE — UTILIZATION REVIEW
Notification of Discharge   This is a Notification of Discharge from our facility [address]  Please be advised that this patient has been discharge from our facility  Below you will find the admission and discharge date and time including the patients disposition  UTILIZATION REVIEW CONTACT:  Andres Kay  Utilization   Network Utilization Review Department  Phone: [phone] x carefully listen to the prompts  All voicemails are confidential   Email: [email or web address]     PHYSICIAN ADVISORY SERVICES:  FOR IMHD-NE-YOGN REVIEW - MEDICAL NECESSITY DENIAL  Phone: [phone]  Fax: [phone]  Email: [email or web address]     PRESENTATION DATE: 8/3/2021 11:41 AM  OBERVATION ADMISSION DATE:   INPATIENT ADMISSION DATE: 8/3/21  1:02 PM   DISCHARGE DATE: 8/7/2021 10:14 AM  DISPOSITION: Home with [place] with [address] [place] [address] Road INFORMATION:  Send all requests for admission clinical reviews, approved or denied determinations and any other requests to dedicated fax number below belonging to the campus where the patient is receiving treatment   List of dedicated fax numbers:  [address] [place] [address] DENIALS (Administrative/Medical Necessity) [phone]   [address] (Maternity/NICU/Pediatrics) [phone]   Sammi Paredes [phone]   Carmen Concepcion [phone]   [facility] [phone]   Lucho Martinez [facility] [address] [facility] [phone]   [facility]  [phone]   [address] [facility] [address]  [address] [facility] [address] [facility] [phone]

## 2021-08-24 ENCOUNTER — TELEPHONE (OUTPATIENT)
Dept: NON INVASIVE DIAGNOSTICS | Facility: HOSPITAL | Age: 66
End: 2021-08-24

## 2021-08-24 NOTE — TELEPHONE ENCOUNTER
Spoke with Patient  Covid screening is negative  Provided the following instructions to the patient: 1)  NPO after midnight except taking AM meds with sip of water  Asked her to hold her lasix dose and to make sure she takes her Xarelto tomorrow morning  2)  Explained need for having a  with her due to receiving anesthesia  3)  Asked her to arrive at 7am with a mask,entering the main entrance of the hospital  Also requested she have her medicine list, insurance card and 's license with her for the registration process  Patient stated verbal understanding of these instructions

## 2021-08-25 ENCOUNTER — ANESTHESIA EVENT (OUTPATIENT)
Dept: NON INVASIVE DIAGNOSTICS | Facility: HOSPITAL | Age: 66
End: 2021-08-25

## 2021-08-25 ENCOUNTER — ANESTHESIA (OUTPATIENT)
Dept: NON INVASIVE DIAGNOSTICS | Facility: HOSPITAL | Age: 66
End: 2021-08-25

## 2021-08-25 ENCOUNTER — HOSPITAL ENCOUNTER (OUTPATIENT)
Dept: NON INVASIVE DIAGNOSTICS | Facility: HOSPITAL | Age: 66
Discharge: HOME | End: 2021-08-25
Payer: COMMERCIAL

## 2021-08-25 VITALS
SYSTOLIC BLOOD PRESSURE: 132 MMHG | HEART RATE: 59 BPM | RESPIRATION RATE: 18 BRPM | BODY MASS INDEX: 34.4 KG/M2 | WEIGHT: 227 LBS | HEIGHT: 68 IN | TEMPERATURE: 97.5 F | OXYGEN SATURATION: 95 % | DIASTOLIC BLOOD PRESSURE: 66 MMHG

## 2021-08-25 DIAGNOSIS — I48.91 ATRIAL FIBRILLATION, UNSPECIFIED TYPE (HCC): ICD-10-CM

## 2021-08-25 PROCEDURE — 92960 CARDIOVERSION ELECTRIC EXT: CPT

## 2021-08-25 PROCEDURE — 93005 ELECTROCARDIOGRAM TRACING: CPT

## 2021-08-25 RX ORDER — SODIUM CHLORIDE, SODIUM LACTATE, POTASSIUM CHLORIDE, CALCIUM CHLORIDE 600; 310; 30; 20 MG/100ML; MG/100ML; MG/100ML; MG/100ML
INJECTION, SOLUTION INTRAVENOUS CONTINUOUS PRN
Status: DISCONTINUED | OUTPATIENT
Start: 2021-08-25 | End: 2021-09-03

## 2021-08-25 RX ORDER — PROPOFOL 10 MG/ML
INJECTION, EMULSION INTRAVENOUS AS NEEDED
Status: DISCONTINUED | OUTPATIENT
Start: 2021-08-25 | End: 2021-09-03

## 2021-08-25 RX ADMIN — PROPOFOL 20 MG: 10 INJECTION, EMULSION INTRAVENOUS at 08:26

## 2021-08-25 RX ADMIN — PROPOFOL 20 MG: 10 INJECTION, EMULSION INTRAVENOUS at 08:23

## 2021-08-25 RX ADMIN — PROPOFOL 90 MG: 10 INJECTION, EMULSION INTRAVENOUS at 08:21

## 2021-08-25 RX ADMIN — SODIUM CHLORIDE, SODIUM LACTATE, POTASSIUM CHLORIDE, AND CALCIUM CHLORIDE: .6; .31; .03; .02 INJECTION, SOLUTION INTRAVENOUS at 08:15

## 2021-08-25 NOTE — H&P
Addendum 8/25/21:  Patient seen and examined today  Pt feeling overall stable since last being seen  She has no current CP, sob, palpitations, edema or orthopnea  Weights have been decreasing since we saw her last  She is taking her xarelto regularly  She is on renally dosed xarelto  She has not missed a dose of xarelto in the last 3 weeks or more  On exam, pt is in no acute distress  No JVD  Neck supple  Heart exam reveals irregularly irregular rhythm  No m g r  lung exam reveals no w/r/r  Extremities without edema  Cap refill <2 seconds  Pre operative ekg reveals rate controlled afib  Pre op labs reviewed and below  Pt agreeable to proceeding with cardioversion today  Agreeable to taking anticoagulation minimally 4 weeks post cardioversion  Follow up has been arranged  See Note from Dr North Sandoval PA-C     Results for Vaughn Patton (MRN [id number]) as of 8/25/2021 07:57   Ref  Range 8/3/2021 12:11   TSH 3RD GENERATON Latest Ref Range: 0 358 - 3 740 uIU/mL 0 025 (L)   Free T4 Latest Ref Range: 0 76 - 1 46 ng/dL 1 35     Results for Vaughn Patton (MRN [id number]) as of 8/25/2021 07:57   Ref  Range 8/7/2021 05:32   Sodium Latest Ref Range: 136 - 145 mmol/L 142   Potassium Latest Ref Range: 3 5 - 5 3 mmol/L 4 1   Chloride Latest Ref Range: 100 - 108 mmol/L 103   CO2 Latest Ref Range: 21 - 32 mmol/L 32   Anion Gap Latest Ref Range: 4 - 13 mmol/L 7   BUN Latest Ref Range: 5 - 25 mg/dL 30 (H)   Creatinine Latest Ref Range: 0 60 - 1 30 mg/dL 1 54 (H)   Glucose, Random Latest Ref Range: 65 - 140 mg/dL 103   Calcium Latest Ref Range: 8 3 - 10 1 mg/dL 9 1   eGFR Latest Units: ml/min/1 73sq m 35   Results for Vaughn Patton (MRN [id number]) as of 8/25/2021 07:57   Ref   Range 8/4/2021 04:42   WBC Latest Ref Range: 4 31 - 10 16 Thousand/uL 9 06   Red Blood Cell Count Latest Ref Range: 3 81 - 5 12 Million/uL 4 21   Hemoglobin Latest Ref Range: 11 5 - 15 4 g/dL 12 8   HCT Latest Ref Range: 34 8 - 46 1 % 38 8   MCV Latest Ref Range: 82 - 98 fL 92   MCH Latest Ref Range: 26 8 - 34 3 pg 30 4   MCHC Latest Ref Range: 31 4 - 37 4 g/dL 33 0   RDW Latest Ref Range: 11 6 - 15 1 % 14 9   Platelet Count Latest Ref Range: 149 - 390 Thousands/uL 174   MPV Latest Ref Range: 8 9 - 12 7 fL 10 7   nRBC Latest Units: /100 WBCs 0   Neutrophils % Latest Ref Range: 43 - 75 % 62   Immat GRANS % Latest Ref Range: 0 - 2 % 0   Lymphocytes Relative Latest Ref Range: 14 - 44 % 25   Monocytes Relative Latest Ref Range: 4 - 12 % 12   Eosinophils Latest Ref Range: 0 - 6 % 0   Basophils Relative Latest Ref Range: 0 - 1 % 1   Immature Grans Absolute Latest Ref Range: 0 00 - 0 20 Thousand/uL 0 03   Absolute Neutrophils Latest Ref Range: 1 85 - 7 62 Thousands/µL 5 69   Lymphocytes Absolute Latest Ref Range: 0 60 - 4 47 Thousands/µL 2 22   Absolute Monocytes Latest Ref Range: 0 17 - 1 22 Thousand/µL 1 04   Absolute Eosinophils Latest Ref Range: 0 00 - 0 61 Thousand/µL 0 03   Basophils Absolute Latest Ref Range: 0 00 - 0 10 Thousands/µL 0 05         Cardiology Outpatient Follow up  Ashlee Pena is a 77year old female who is seen in follow up    HPI:  Patient with history of COPD, HTN, CKD, tobacco use disorder was referred to discuss concern with atrial fibrillation 6/21/21  Pt had not been feeling well with low appetite for 1 month  Lost 11 lbs over the last 8 weeks  Pt was noted to have an irregular rhythm in the office and ekg was done that showed afib with rvr, ventricular rate 143  Pt was started on metoprolol and stoped atenolol  Pt also started xarelto given chasvasc elevated (HTN, female, age)  Labs were drawn that came [place] 809[address]  She was also noted to be hyperthyroid  She was sent to the ED urgently from our clinic  During admission it was found likely that her afib was caused by her hyperthyroidism  She was started on methimazole and eventually dose was uptitrated to 10 mg daily   She was put on propranolol  This was uptitrated but rates remained uncontrolled  An echo was don showing a normal LVEF  She was started on cardizem and rates remained uncontrolled  She was started on digoxin but ultimately stopped, and rates were in the 110 range at discharge  She saw PCP in follow up on 7/13/21 and cardizem dose was adjusted down to 120 mg BID  Patient was seen on 8/3/21 In rapid afib and had gained another 15 pounds  She reports that she was avoiding contacting us and knew she was starting to fill with fluid  She required admission for better rate control and diuresis  She lost 22 lbs of weight  Weight on discharge was 240 lbs  She diuresed 8 3 L  Her lasix was icnreased to 40 mg daily  Her TSH is still low but her T4 is normal  She reports no complaints  She is checking her weight every day  She is compliant with her xarelto  She has edema or orthopnea       Past Medical History:   Diagnosis Date    HTN (hypertension)     Patient Active Problem List   Diagnosis Code    HTN, goal below 140/90 I10    OA (osteoarthritis) of knee M17 10    ASCVD (arteriosclerotic cardiovascular disease) I25 10    Tobacco use disorder F17 200    COPD, moderate ([address] ) J44 9    Cataract H26 9    Severe obesity with body mass index (BMI) of 35 0 to 39 9 with serious comorbidity (HCC) E66 01    Hypertensive kidney disease with stage 3a chronic kidney disease I12 9, N18 31    Chronic kidney disease, stage 3a ([address] ) N18 31    Atrial fibrillation (HCC) I48 91    KAELYN (acute kidney injury) ([address] ) N17 9    Bilateral lower extremity edema R60 0    Hyperthyroidism E05 90    Atrial fibrillation with RVR ([address] ) I48 91     Past Surgical History:   Procedure Laterality Date    NONE     Family History   Problem Relation Age of Onset    Lung Disorder Father   black lung    Genitourinary Disorder Mother   ovarian cyst     Social History     Tobacco Use    Smoking status: Former Smoker   Years: 44 00   Types: Cigarettes   Quit date: 6/15/2021   Years since quittin 1    Smokeless tobacco: Never Used    Tobacco comment: only did 1ppd since 60 yo   Substance Use Topics    Alcohol use: Not Currently    Drug use: No     Vaping/E-Cigarette Use    Vaping/E-Cigarette Use Never User     Vaping/E-Cigarette Substances     Vaping/E-Cigarette Devices     Review of patient's allergies indicates:  No Known Allergies    Current Outpatient Medications   Medication Sig Dispense Refill    Potassium Chloride Piper ER 20 MEQ Oral Tablet Extended Release Take 20 mEq by mouth daily   Furosemide 40 MG Oral Tablet (Lasix) Take 40 mg by mouth daily   dilTIAZem HCl ER Coated Beads 120 MG Oral Capsule Extended Release 24 Hour Take 1 Cap by mouth 2 times a day  30 Cap 1    busPIRone HCl 5 MG Oral Tablet (Buspar) Take 1 Tab by mouth 2 times a day  For anxiety (Patient taking differently: Take 5 mg by mouth daily  For anxiety) 60 Tab 5    Magnesium Oxide 400 MG Oral Tablet Take 1 Tab by mouth daily  (Patient taking differently: Take 400 mg by mouth 2 times a day ) 90 Tab 0    methIMAzole 10 MG Oral Tablet (Tapazole) Take 1 Tab by mouth daily  90 Tab 0    Propranolol HCl 80 MG Oral Tablet (Inderal) Take 2 Tabs by mouth every 6 hours  240 Tab 11    Rivaroxaban 15 MG Oral Tablet (Xarelto) Take 1 Tab by mouth daily  90 Tab 1    Fluticasone Propionate  MCG/ACT Inhalation Aerosol (Flovent HFA) Inhale 2 Puffs by mouth 2 times a day  12 g 3    Albuterol Sulfate  (90 Base) MCG/ACT Inhalation Aerosol Solution Inhale 2 Puffs by mouth every 4 hours as needed for Wheezing  18 g 1   Acetaminophen  MG Oral Tablet Extended Release Take 650 mg by mouth every 8 hours as needed       ROS:  Constitutional: (-) weight change  Cardiovascular: (-) exertional dyspnea and (-) lower extremity edema  Pulmonary: (-) dyspnea with exertion and (-) cough and (-) wheezing  Abdominal/GI: (-) negative: no pain, heartburn, dysphagia, bleeding, change in bowel habits, nausea or vomiting  Musculoskeletal: (-) negative: no pain  Skin: (-) negative: no rash or new or changing moles  Neurology: (-) negative: no focal neurologic defect    PHYSICAL EXAMINATION  /70  Pulse 82  Temp 35 7 °C (96 2 °F)  Resp 20  Ht 1 715 m (5' 7 5")  Wt 107 2 kg (236 lb 6 4 oz)  SpO2 95%  BMI 36 48 kg/m²  BSA 2 26 m²   Body mass index is 36 48 kg/m²  Constitutional: in mild distress   Neck: supple, normal range of motion, -JVD   CV: irregularly irregular   Chest: normal respiratory effort, lungs clear to auscultation and percussion  Musculoskeletal: (-) negative  Extremities: no clubbing, cyanosis, - edema, otherwise grossly normal, warm, and dry  Skin: warm, dry, intact:  Neuro: alert, oriented to person, place, and time, normal mental status exam, gait normal    Laboratory Data Review:  Pre op labs reviewed in care everywhere    Impression:  Afib - likely due to hyperthyroidism, rate controlled on renally dosed xarelto  Chronic HFpEF- appears euvolemic on exam   CKD- Cr 1 5-> reports labs drawn two days ago but none in care everywhere  Hyperthyroidism- new diagnosis  HTN  COPD  Tobacco Use Disorder     Plan:  1  Plan for DCCV at [address] [place] [address] on 8/25/21 at 8 Am  2  Pre op labs in place  3  Follow up in 4 weeks--> 2 weeks post cardioversion  4   Follow up with endocrinology for hyperthyroidism    Leah Grey, [address], [address] [place] [address] level, [address] [facility]  Phone: [phone]  Fax: [phone]  8/11/2021

## 2021-08-25 NOTE — ANESTHESIA PREPROCEDURE EVALUATION
Procedure:  CARDIOVERSION (NON INVASIVE ONLY)    Relevant Problems   CARDIO   (+) Persistent atrial fibrillation (HCC)      ENDO   (+) Hyperthyroidism      /RENAL   (+) KAELYN (acute kidney injury) ([facility] )      MUSCULOSKELETAL   (+) Adhesive capsulitis of right shoulder      Other   (+) Acute heart failure with preserved ejection fraction (HCC)   (+) Tobacco abuse   COPD (chronic obstructive pulmonary disease) (HCC)     LEFT VENTRICLE:  Ejection fraction was estimated in the range of 50 %  MITRAL VALVE:   There was mild to moderate regurgitation  Physical Exam    Airway    Mallampati score: II  TM Distance: >3 FB  Neck ROM: full     Dental       Cardiovascular      Pulmonary      Other Findings        Anesthesia Plan  ASA Score- 3     Anesthesia Type- IV sedation with anesthesia with ASA Monitors  Additional Monitors:   Airway Plan:           Plan Factors-    Chart reviewed  Induction- intravenous  Postoperative Plan-     Informed Consent- Anesthetic plan and risks discussed with patient  I personally reviewed this patient with the CRNA  Discussed and agreed on the Anesthesia Plan with the CRNA  Serina Christine

## 2021-09-02 LAB
ATRIAL RATE: 105 BPM
ATRIAL RATE: 58 BPM
P AXIS: 86 DEGREES
PR INTERVAL: 194 MS
QRS AXIS: -62 DEGREES
QRS AXIS: -70 DEGREES
QRSD INTERVAL: 110 MS
QRSD INTERVAL: 112 MS
QT INTERVAL: 392 MS
QT INTERVAL: 464 MS
QTC INTERVAL: 455 MS
QTC INTERVAL: 474 MS
T WAVE AXIS: 74 DEGREES
T WAVE AXIS: 78 DEGREES
VENTRICULAR RATE: 58 BPM
VENTRICULAR RATE: 88 BPM

## 2021-09-14 ENCOUNTER — CONSULT (OUTPATIENT)
Dept: ENDOCRINOLOGY | Facility: CLINIC | Age: 66
End: 2021-09-14
Payer: COMMERCIAL

## 2021-09-14 VITALS
WEIGHT: 228.8 LBS | BODY MASS INDEX: 34.68 KG/M2 | SYSTOLIC BLOOD PRESSURE: 142 MMHG | DIASTOLIC BLOOD PRESSURE: 70 MMHG | HEART RATE: 68 BPM | OXYGEN SATURATION: 98 % | HEIGHT: 68 IN

## 2021-09-14 DIAGNOSIS — E05.90 HYPERTHYROIDISM: Primary | ICD-10-CM

## 2021-09-14 DIAGNOSIS — I10 HTN, GOAL BELOW 140/90: ICD-10-CM

## 2021-09-14 DIAGNOSIS — E66.01 SEVERE OBESITY WITH BODY MASS INDEX (BMI) OF 35.0 TO 39.9 WITH SERIOUS COMORBIDITY (HCC): ICD-10-CM

## 2021-09-14 PROBLEM — J44.9 COPD, MODERATE (HCC): Status: ACTIVE | Noted: 2019-08-20

## 2021-09-14 PROBLEM — N18.32 CHRONIC KIDNEY DISEASE, STAGE 3B (HCC): Status: ACTIVE | Noted: 2021-06-15

## 2021-09-14 PROCEDURE — 99204 OFFICE O/P NEW MOD 45 MIN: CPT | Performed by: NURSE PRACTITIONER

## 2021-09-14 NOTE — PROGRESS NOTES
New Patient Progress Note       Chief Complaint   Patient presents with    Hyperthyroidism        History of Present Illness: Cornelia Jaramillo is a 77 y o  female with HTN, HLD, and hyperthyroidism presenting to the office today to establish care  Pertinent medical history includes atrial fibrillation and initially diagnosed in April of 2021  Presented to her PCP on 06/21/2021 with complaints of weight loss - approximately 11 lb over 8 weeks - decreased appetite, increased anxiety, and tremor  She was hospitalized shortly thereafter for CHF exacerbation, KAELYN, and aFib secondary to undiagnosed underlying hyperthyroidism  She was started on methimazole which was up titrated to 10 mg daily  She was prescribed propranolol to assist with rate control  An echocardiogram was performed showing a normal LVEF  She was started on Cardizem  She was admitted to the hospital again on 08/03/2021  She remained in rapid Afib and had gained 15 lb of fluid  She was aggressively diuresed and lost approximately 22 lb of weight  Lasix was increased to 40 mg daily  She underwent DCCV on 08/25/2021  As of her last visit with Cardiology on 09/03/2021 she was in normal sinus rhythm  She has a follow-up appointment with Cardiology in October  Component      Latest Ref Rng & Units 7/6/2021 8/3/2021   TSH 3RD GENERATON      0 358 - 3 740 uIU/mL <0 007 (L) 0 025 (L)     Component      Latest Ref Rng & Units 6/25/2021 7/6/2021 8/3/2021   Free T4      0 76 - 1 46 ng/dL 1 91 (H) 2 01 (H) 1 35     Results for Nir Stack (MRN [id number]) as of 9/14/2021 12:50   Ref  Range 6/23/2021 16:00   THYROGLOBULIN AB Latest Ref Range: 0 0 - 0 9 IU/mL <1 0   THYROID MICROSOMAL ANTIBODY Latest Ref Range: 0 - 34 IU/mL <9     HPI  Onset:   May/June 2021  Etiology:  Unknown    Symptoms:  Patient had been experiencing tremor, palpitations, increased anxiety, and hyper defecation    She reports much of this has resolved since starting methimazole in June  Heat intolerance -  Tremor: -  Palpitations: -  Anxiety: +;   Patient reports she is at her baseline  Hair loss +  Itchy skin: +  No changes to nails   visual changes/eye pain: -  She reports that her weight has stabilized  patient denies any side effects from the 5th methimazole including rash, fever, mouth sores, right upper quadrant pain, and jaundice      She denies any family history of hyperthyroidism  She denies ever taking amiodarone  Ultrasound of the thyroid was completed on 06/22/2021  No nodules noted  Patient denies any compressive symptoms  Patient denies any family history of thyroid cancer  She denies any history of radiation to her head or neck  Imaging/Labs:    THYROID ULTRASOUND     INDICATION:    hyperthyroidism      COMPARISON:  None     TECHNIQUE:   Ultrasound of the thyroid was performed with a high frequency linear transducer in transverse and sagittal planes including volumetric imaging sweeps as well as traditional still imaging technique      FINDINGS:  Thyroid parenchyma is diffusely heterogeneous in echotexture  The thyroid is borderline enlarged     Right lobe:  4 1 x 2 2 x 1 6 cm   6 8 mL  Left lobe:  4 x 2 2 x 1 5 cm   6 5 mL  Isthmus:  0 3 cm      There are no dominant nodules that meet current ACR criteria for biopsy or follow-up      IMPRESSION:     The thyroid is prominent  Correlation with thyroid function studies is advised      No nodule meets current ACR criteria for requiring biopsy or followup ultrasounds       Patient Active Problem List   Diagnosis    Acute pain of right shoulder    Adhesive capsulitis of right shoulder    Persistent atrial fibrillation ([address] )    Hyperthyroidism    KAELYN (acute kidney injury) ([address] )    Tobacco abuse    Bilateral lower extremity edema    Acute heart failure with preserved ejection fraction (HCC)    Hypokalemia    Severe obesity with body mass index (BMI) of 35 0 to 39 9 with serious comorbidity ([facility] )    ASCVD (arteriosclerotic cardiovascular disease)    Chronic kidney disease, stage 3b (HCC)    COPD, moderate ([facility] )    HTN, goal below 140/90      Past Medical History:   Diagnosis Date    COPD (chronic obstructive pulmonary disease) (HCC)     High cholesterol     Hypertension     Hyperthyroidism     Persistent atrial fibrillation (HCC)       Past Surgical History:   Procedure Laterality Date    APPENDECTOMY      CATARACT EXTRACTION Bilateral       Family History   Problem Relation Age of Onset    Uterine cancer Mother     Lung disease Father     No Known Problems Sister     No Known Problems Sister      Social History     Tobacco Use    Smoking status: Former Smoker     Packs/day: 0 50     Types: Cigarettes    Smokeless tobacco: Never Used    Tobacco comment: 30 pk yr hx   Substance Use Topics    Alcohol use: Yes     Comment: occasionally     No Known Allergies    Current Outpatient Medications:     albuterol (PROVENTIL HFA,VENTOLIN HFA) 90 mcg/act inhaler, Inhale 2 puffs 4 (four) times a day , Disp: , Rfl:     busPIRone (BUSPAR) 5 mg tablet, Take 5 mg by mouth as needed, Disp: , Rfl:     diltiazem (CARDIZEM) 120 MG tablet, Take 120 mg by mouth every 12 (twelve) hours, Disp: , Rfl:     fluticasone (FLOVENT HFA) 110 MCG/ACT inhaler, Inhale 2 puffs 2 (two) times a day , Disp: , Rfl:     furosemide (LASIX) 20 mg tablet, Take 2 tablets (40 mg total) by mouth daily, Disp: 30 tablet, Rfl: 0    magnesium oxide (MAG-OX) 400 mg, Take 1 tablet (400 mg total) by mouth 2 (two) times a day, Disp: 60 tablet, Rfl: 0    methimazole (TAPAZOLE) 10 mg tablet, Take 1 tablet (10 mg total) by mouth daily (Patient taking differently: Take 10 mg by mouth daily ), Disp: 30 tablet, Rfl: 0    propranolol (INDERAL) 80 mg tablet, Take 160 mg by mouth every 6 (six) hours , Disp: , Rfl:     rivaroxaban (Xarelto) 15 mg tablet, Take 15 mg by mouth daily with breakfast, Disp: , Rfl:     potassium chloride (K-DUR,KLOR-CON) 20 mEq tablet, Take 1 tablet (20 mEq total) by mouth daily for 15 days, Disp: 15 tablet, Rfl: 0    Review of Systems   Constitutional: Negative for activity change, appetite change, fatigue and unexpected weight change  HENT: Negative for sore throat, trouble swallowing and voice change  Eyes: Negative for visual disturbance  Respiratory: Negative for cough, chest tightness and shortness of breath  Cardiovascular: Negative for chest pain, palpitations and leg swelling  Gastrointestinal: Negative for constipation, diarrhea, nausea and vomiting  Endocrine: Negative for cold intolerance and heat intolerance  Genitourinary: Negative for frequency  Musculoskeletal: Negative for arthralgias, back pain, joint swelling and myalgias  Skin: Negative for wound  Allergic/Immunologic: Negative for environmental allergies and food allergies  Neurological: Positive for tremors ( intermittent; Only when nervous)  Negative for dizziness, weakness, light-headedness, numbness and headaches  Hematological: Does not bruise/bleed easily  Psychiatric/Behavioral: Negative for confusion, decreased concentration, dysphoric mood and sleep disturbance  The patient is nervous/anxious  Physical Exam:  Body mass index is 34 79 kg/m²  /70 (BP Location: Left arm, Cuff Size: Adult)   Pulse 68   Ht 5' 8" (1 727 m)   Wt 104 kg (228 lb 12 8 oz)   SpO2 98%   BMI 34 79 kg/m²    Wt Readings from Last 3 Encounters:   09/14/21 104 kg (228 lb 12 8 oz)   08/25/21 103 kg (227 lb)   08/07/21 109 kg (240 lb 4 8 oz)       Physical Exam  Vitals reviewed  Constitutional:       General: She is not in acute distress  Appearance: She is well-developed  She is obese  She is not ill-appearing  HENT:      Head: Normocephalic and atraumatic  Comments: Mask in place  Eyes:      Pupils: Pupils are equal, round, and reactive to light  Neck:      Thyroid: No thyromegaly     Cardiovascular: Rate and Rhythm: Normal rate and regular rhythm  Pulses: Normal pulses  Heart sounds: Normal heart sounds  Pulmonary:      Effort: Pulmonary effort is normal       Breath sounds: Normal breath sounds  Abdominal:      General: Bowel sounds are normal  There is no distension  Palpations: Abdomen is soft  Tenderness: There is no abdominal tenderness  Musculoskeletal:      Cervical back: Normal range of motion and neck supple  Right lower leg: No edema  Left lower leg: No edema  Lymphadenopathy:      Cervical: No cervical adenopathy  Skin:     General: Skin is warm and dry  Capillary Refill: Capillary refill takes less than 2 seconds  Neurological:      Mental Status: She is alert and oriented to person, place, and time  Gait: Gait normal    Psychiatric:         Mood and Affect: Mood normal          Behavior: Behavior normal          Labs:       Lab Results   Component Value Date    CREATININE 1 54 (H) 08/07/2021    CREATININE 1 59 (H) 08/06/2021    CREATININE 1 45 (H) 08/05/2021    BUN 30 (H) 08/07/2021    K 4 1 08/07/2021     08/07/2021    CO2 32 08/07/2021     eGFR   Date Value Ref Range Status   08/07/2021 35 ml/min/1 73sq m Final       No results found for: CHOL, HDL, TRIG, CHOLHDL    Lab Results   Component Value Date    ALT 25 08/05/2021    AST 23 08/05/2021    ALKPHOS 93 08/05/2021       Lab Results   Component Value Date    FREET4 1 35 08/03/2021         Impression & Plan:    Problem List Items Addressed This Visit        Endocrine    Hyperthyroidism - Primary     Patient's free T4 is normalizing  TSH is also starting to respond as of last thyroid function test   Repeat TSH, free T4  Check TRAB and TSI to determine if this is Graves disease  Counseled on pathophysiology of hyperthyroidism  Counseled on complications related to untreated hyperthyroidism including arrhythmias, bone loss, and thyroid storm    Discussed treatment options including medication management with methimazole, radioactive iodine ablation, and thyroidectomy  At this time, patient would like to  Continue with medical management  She will discuss radioactive iodine ablation with her family  She knows that if she wishes to pursue this, she only need call the office and let me know and I will give her a referral to Nuclear Medicine  Relevant Orders    TSH, 3rd generation Lab Collect    T4, free Lab Collect    TRAb (TSH Receptor Binding Antibody)    Thyroid stimulating immunoglobulin Lab Collect    Hepatic function panel Lab Collect    CBC and differential Lab Collect       Cardiovascular and Mediastinum    HTN, goal below 140/90       BP stable at 142/70  Continue current regimen  Other    Severe obesity with body mass index (BMI) of 35 0 to 39 9 with serious comorbidity ([address] )          Orders Placed This Encounter   Procedures    TSH, 3rd generation Lab Collect     This is a patient instruction: This test is non-fasting  Please drink two glasses of water morning of bloodwork  Standing Status:   Future     Standing Expiration Date:   9/14/2022    T4, free Lab Collect     Standing Status:   Future     Standing Expiration Date:   9/14/2022    TRAb (TSH Receptor Binding Antibody)     Standing Status:   Future     Standing Expiration Date:   9/14/2022    Thyroid stimulating immunoglobulin Lab Collect     Standing Status:   Future     Standing Expiration Date:   9/14/2022    Hepatic function panel Lab Collect     This is a patient instruction: This test is non-fasting  Please drink two glasses of water morning of bloodwork  Standing Status:   Future     Standing Expiration Date:   9/14/2022    CBC and differential Lab Collect     This is a patient instruction: This test is non-fasting  Please drink two glasses of water morning of bloodwork          Standing Status:   Future     Standing Expiration Date:   9/14/2022       There are no Patient Instructions on file for this visit  Discussed with the patient and all questioned fully answered  She will call me if any problems arise  Follow-up appointment in 6 months       Counseled patient on diagnostic results, prognosis, risk and benefit of treatment options, instruction for management, importance of treatment compliance, Risk  factor reduction and impressions      LINDA Hunter

## 2021-09-14 NOTE — ASSESSMENT & PLAN NOTE
Patient's free T4 is normalizing  TSH is also starting to respond as of last thyroid function test   Repeat TSH, free T4  Check TRAB and TSI to determine if this is Graves disease  Counseled on pathophysiology of hyperthyroidism  Counseled on complications related to untreated hyperthyroidism including arrhythmias, bone loss, and thyroid storm  Discussed treatment options including medication management with methimazole, radioactive iodine ablation, and thyroidectomy  At this time, patient would like to  Continue with medical management  She will discuss radioactive iodine ablation with her family  She knows that if she wishes to pursue this, she only need call the office and let me know and I will give her a referral to Nuclear Medicine

## 2022-03-29 ENCOUNTER — TELEPHONE (OUTPATIENT)
Dept: ENDOCRINOLOGY | Facility: CLINIC | Age: 67
End: 2022-03-29

## 2022-03-30 ENCOUNTER — OFFICE VISIT (OUTPATIENT)
Dept: ENDOCRINOLOGY | Facility: CLINIC | Age: 67
End: 2022-03-30
Payer: COMMERCIAL

## 2022-03-30 VITALS
DIASTOLIC BLOOD PRESSURE: 72 MMHG | HEART RATE: 72 BPM | SYSTOLIC BLOOD PRESSURE: 138 MMHG | HEIGHT: 68 IN | BODY MASS INDEX: 37.62 KG/M2 | WEIGHT: 248.2 LBS

## 2022-03-30 DIAGNOSIS — E05.00 GRAVES DISEASE: Primary | ICD-10-CM

## 2022-03-30 PROCEDURE — 99213 OFFICE O/P EST LOW 20 MIN: CPT | Performed by: STUDENT IN AN ORGANIZED HEALTH CARE EDUCATION/TRAINING PROGRAM

## 2022-03-30 RX ORDER — SENNOSIDES 8.6 MG
650 CAPSULE ORAL 2 TIMES DAILY PRN
COMMUNITY

## 2022-03-30 NOTE — ASSESSMENT & PLAN NOTE
Hyperthyroidism secondary to Graves' disease  Clinically appears stable on methimazole 10 mg daily  Plan to update thyroid labs now and modify methimazole as appropriate  Reviewed side effects of ATDs including agranulocytosis and hepatotoxicity  May plan to repeat TSI in Sep 2023 to evaluate for possibility of remission   Reviewed definitive treatment options as well, including surgery and LYNCH

## 2022-03-30 NOTE — PATIENT INSTRUCTIONS
Graves Disease   WHAT YOU NEED TO KNOW:   Graves disease is an autoimmune disease that causes your immune system to attack your thyroid gland  This causes your body to make too much thyroid hormone and leads to hyperthyroidism  The thyroid gland is a butterfly-shaped organ that is found in the front part of your neck  Thyroid hormones regulate body temperature, heart rate, and weight  DISCHARGE INSTRUCTIONS:   Call 911 or have someone call 911 for any of the following:   · You have a seizure  · You feel like you are going to faint  · You have sudden chest pain or shortness of breath  Return to the emergency department if:   · You have a fever  · You have trouble thinking clearly  · You are shaking or sweating  · Your heart is beating faster than usual, or you have an irregular heartbeat  Contact your healthcare provider if:   · You have nausea, vomiting, or diarrhea  · You feel nervous, restless, confused or agitated       · You run out of thyroid medicine, or you have stopped taking it  · You have questions or concerns about your condition or care  Medicines:   · Antithyroid medicines  decrease thyroid hormone levels and your symptoms  · Radioactive iodine  is given to damage or kill some thyroid gland cells  This may decrease the amount of thyroid hormone produced  Tell your healthcare provider if you are breastfeeding, or you know or think you are pregnant  This medicine can be harmful to your baby  · Heart medicine  may be given to control and regulate your heart rate  · Take your medicine as directed  Contact your healthcare provider if you think your medicine is not helping or if you have side effects  Tell him or her if you are allergic to any medicine  Keep a list of the medicines, vitamins, and herbs you take  Include the amounts, and when and why you take them  Bring the list or the pill bottles to follow-up visits   Carry your medicine list with you in case of an emergency  Manage Graves disease:   · Do not smoke or be around secondhand smoke  Cigarette smoke increases your risk of GO or can make it worse if you already have it  Nicotine and other chemicals in cigarettes and cigars can also cause lung damage  Ask your healthcare provider for information if you currently smoke and need help to quit  E-cigarettes or smokeless tobacco still contain nicotine  Talk to your healthcare provider before you use these products  · Sleep with your head elevated if you have eye symptoms  This may help to decrease swelling of your eyelids  Your healthcare provider may recommend that you sleep with your eyes taped shut  This can help to prevent dry eyes  · Sunglasses  may help decrease light sensitivity  · Take medicine as directed and go to follow-up appointments  Do not stop taking your medicine unless your healthcare provider has asked you to  This could increase your thyroid levels and lead to other health problems  You will need to go to regular follow-up appointments to have your thyroid levels checked  Follow up with your healthcare provider as directed: You may need to return for regular blood tests to check your thyroid hormone levels  This will help your healthcare provider decide if you are getting the right amount of medicine  Do not stop taking your medicines without talking to your healthcare provider first  Write down your questions so you remember to ask them during your visits  © Copyright [employer] 2022 Information is for End User's use only and may not be sold, redistributed or otherwise used for commercial purposes  All illustrations and images included in CareNotes® are the copyrighted property of [employer]  or Eva Hughes  The above information is an  only  It is not intended as medical advice for individual conditions or treatments   Talk to your doctor, nurse or pharmacist before following any medical regimen to see if it is safe and effective for you  You are taking either propylthioracil (also called PTU) or tapazole (also called methimazole) for treatment of your hyperthyroidism  One out of 300 patients taking either of these drugs may develop a condition called "agranulocytosis", which is lowering of the white blood cell count  White blood cells help your body to fight infection  Patients with low white blood cell counts develp fever and sore throats and risk severe infections  The agranulocytosis will resolve when you stop the drug  During the entire period you are taking either PTU or tapazole, if you develop a fever or sore throat you must stop the drug and contact our office for lab tests  You can present this form to a lab, physician's office or emergency room to have blood drawn for the test indicated below:    Lab Test Requested: Complete Blood Count with differential    Indication: Rule out agranulocytosis, a rare side effect of anti-thyroid drugs    ICD-9 Code: 242 9    In addition, if you are nauseous or lose your appetite for more than 2 days, you must stop the drug and contact the office

## 2022-03-30 NOTE — PROGRESS NOTES
Reva Xie [facility] 74 y o  female MRN: [id number]    Encounter: [id number]      Assessment/Plan     Problem List Items Addressed This Visit        Endocrine    Hyperthyroidism - Primary     Hyperthyroidism secondary to Graves' disease  Clinically appears stable on methimazole 10 mg daily  Plan to update thyroid labs now and modify methimazole as appropriate  Reviewed side effects of ATDs including agranulocytosis and hepatotoxicity  May plan to repeat TSI in Sep 2023 to evaluate for possibility of remission  Reviewed definitive treatment options as well, including surgery and LYNCH             CC: Graves' disease    History of Present Illness     HPI:    Reva Xie presents for follow up of hyperthyroidism  Hyperthyroidism complicated by atrial fibrillation  Donis Causey is on methimazole 10 mg daily, which she is tolerating well  She reports symptoms of hyperthyroidism have all but gone away  She continues to take propranolol as well  She will be meeting with her cardiologist soon  She denies any eye or neck complaints  Review of Systems   Constitutional: Negative for fatigue and unexpected weight change  HENT: Negative for trouble swallowing and voice change  Eyes: Negative for photophobia and visual disturbance  Respiratory: Negative for shortness of breath  Cardiovascular: Negative for chest pain and palpitations  Endocrine: Negative for cold intolerance and heat intolerance  Skin: Negative for color change and pallor  Neurological: Negative for tremors and weakness  Psychiatric/Behavioral: Negative for agitation and behavioral problems         Historical Information   Past Medical History:   Diagnosis Date    COPD (chronic obstructive pulmonary disease) ([address] )     High cholesterol     Hypertension     Hyperthyroidism     Persistent atrial fibrillation (HCC)      Past Surgical History:   Procedure Laterality Date    APPENDECTOMY      CATARACT EXTRACTION Bilateral      Social History   Social History     Substance and Sexual Activity   Alcohol Use Yes    Comment: occasionally     Social History     Substance and Sexual Activity   Drug Use Never     Social History     Tobacco Use   Smoking Status Former Smoker    Packs/day: 0 50    Types: Cigarettes   Smokeless Tobacco Never Used   Tobacco Comment    30 pk yr hx     Family History:   Family History   Problem Relation Age of Onset    Uterine cancer Mother     Lung disease Father     No Known Problems Sister     No Known Problems Sister        Meds/Allergies   Current Outpatient Medications   Medication Sig Dispense Refill    acetaminophen (TYLENOL) 650 mg CR tablet Take 650 mg by mouth 2 (two) times a day as needed for mild pain      albuterol (PROVENTIL HFA,VENTOLIN HFA) 90 mcg/act inhaler Inhale 2 puffs 4 (four) times a day       busPIRone (BUSPAR) 5 mg tablet Take 5 mg by mouth as needed      diltiazem (CARDIZEM) 120 MG tablet Take 120 mg by mouth every 12 (twelve) hours      fluticasone (FLOVENT HFA) 110 MCG/ACT inhaler Inhale 2 puffs 2 (two) times a day       furosemide (LASIX) 20 mg tablet Take 2 tablets (40 mg total) by mouth daily 30 tablet 0    magnesium oxide (MAG-OX) 400 mg Take 1 tablet (400 mg total) by mouth 2 (two) times a day 60 tablet 0    methimazole (TAPAZOLE) 10 mg tablet Take 1 tablet (10 mg total) by mouth daily (Patient taking differently: Take 10 mg by mouth daily ) 30 tablet 0    propranolol (INDERAL) 80 mg tablet Take 160 mg by mouth every 6 (six) hours       rivaroxaban (Xarelto) 15 mg tablet Take 15 mg by mouth daily with breakfast      potassium chloride (K-DUR,KLOR-CON) 20 mEq tablet Take 1 tablet (20 mEq total) by mouth daily for 15 days 15 tablet 0     No current facility-administered medications for this visit  No Known Allergies    Objective   Vitals: Blood pressure 138/72, pulse 72, height 5' 8" (1 727 m), weight 113 kg (248 lb 3 2 oz)  Physical Exam  Vitals reviewed     Constitutional: General: She is not in acute distress  HENT:      Head: Normocephalic and atraumatic  Nose: Nose normal    Eyes:      General: No scleral icterus  Conjunctiva/sclera: Conjunctivae normal    Neck:      Thyroid: No thyroid mass, thyromegaly or thyroid tenderness  Cardiovascular:      Rate and Rhythm: Normal rate  Pulses: Normal pulses  Pulmonary:      Effort: Pulmonary effort is normal    Musculoskeletal:      Cervical back: Normal range of motion and neck supple  Skin:     General: Skin is warm and dry  Neurological:      General: No focal deficit present  Mental Status: She is alert  Psychiatric:         Mood and Affect: Mood normal          Behavior: Behavior normal          The history was obtained from the review of the chart, patient  Lab Results:   Lab Results   Component Value Date/Time    TSH 3RD GENERATON 0 025 (L) 08/03/2021 12:11 PM    TSH 3RD GENERATON <0 007 (L) 07/06/2021 08:32 AM    Free T4 1 35 08/03/2021 12:11 PM    Free T4 2 01 (H) 07/06/2021 08:32 AM    Free T4 1 91 (H) 06/25/2021 09:25 AM      Ref Range & Units 9/17/21  9:20 AM   TSI NUMERIC VALUE <140 % baseline 199 High        Ref Range & Units 9/17/21  9:19 AM   TRAB <=2 00 IU/L 5 49 High        Ref Range & Units 9/17/21  9:20 AM   Albumin 3 8 - 5 0 g/dL 4 1    AST 10 - 35 U/L 24    Alkaline Phosphatase 35 - 130 U/L 117    ALT 10 - 35 U/L 24    Bilirubin, Total <=1 2 mg/dL 0 3    Bilirubin, Direct 0 0 - 0 3 mg/dL <0 2    Protein 6 0 - 8 3 g/dL 7 0       Ref Range & Units 9/17/21  9:20 AM   WBC 4 00 - 10 80 K/uL 10 04    RBC 3 85 - 5 15 M/uL 4 78    HGB 12 0 - 15 3 g/dL 14 7    HCT 36 0 - 45 2 % 46  0 High     MCV 81 5 - 97 5 fL 96 2    MCH 27 0 - 34 0 pg 30 8    MCHC 32 0 - 36 0 g/dL 32 0    RDW 11 5 - 15 5 % 14 1    MPV 6 6 - 11 1 fL 11 5 High     nRBCs <=0 /100 WBCs 0     - 400 K/uL 253            Imaging Studies:   Results for orders placed during the hospital encounter of 06/22/21    US thyroid    Impression  The thyroid is prominent  Correlation with thyroid function studies is advised  No nodule meets current ACR criteria for requiring biopsy or followup ultrasounds  Reference: ACR Thyroid Imaging, Reporting and Data System (TI-RADS): White Paper of the [employer]  J AM Saima Radiol [id number];[id number]  (additional recommendations based on American Thyroid Association 2015 guidelines )      Workstation performed: [id number]      I have personally reviewed pertinent reports  Portions of the record may have been created with voice recognition software  Occasional wrong word or "sound a like" substitutions may have occurred due to the inherent limitations of voice recognition software  Read the chart carefully and recognize, using context, where substitutions have occurred

## 2022-04-06 ENCOUNTER — TELEPHONE (OUTPATIENT)
Dept: ENDOCRINOLOGY | Facility: CLINIC | Age: 67
End: 2022-04-06

## 2022-04-06 DIAGNOSIS — E05.00 GRAVES DISEASE: Primary | ICD-10-CM

## 2022-04-06 NOTE — TELEPHONE ENCOUNTER
Can patient be advised that I reviewed her thyroid labs and it shows that we need to decrease her dose of methimazole  Would like to reduce her dose of methimazole to 5 mg every other day  Would like to repeat thyroid labs including TSH, free T4, and T3 in 4-6 weeks  Of note, patients vitamin D level is severely low  I will defer to ordering provider, but she should be started on a high dose vitamin D replacement

## 2022-04-12 ENCOUNTER — HOSPITAL ENCOUNTER (OUTPATIENT)
Dept: RADIOLOGY | Facility: CLINIC | Age: 67
Discharge: HOME | End: 2022-04-12
Payer: COMMERCIAL

## 2022-04-12 VITALS — BODY MASS INDEX: 37.13 KG/M2 | WEIGHT: 245 LBS | HEIGHT: 68 IN

## 2022-04-12 DIAGNOSIS — Z12.31 ENCOUNTER FOR SCREENING MAMMOGRAM FOR MALIGNANT NEOPLASM OF BREAST: ICD-10-CM

## 2022-04-12 PROCEDURE — 77063 BREAST TOMOSYNTHESIS BI: CPT

## 2022-04-12 PROCEDURE — 77067 SCR MAMMO BI INCL CAD: CPT

## 2022-04-12 NOTE — ASSESSMENT & PLAN NOTE
TSH is still low, free T4 normal-compared to previous admission, patient is condition biochemically improving  Continue methimazole Yes

## 2022-05-06 ENCOUNTER — TELEPHONE (OUTPATIENT)
Dept: ENDOCRINOLOGY | Facility: CLINIC | Age: 67
End: 2022-05-06

## 2022-05-06 DIAGNOSIS — E05.00 GRAVES DISEASE: Primary | ICD-10-CM

## 2022-05-06 DIAGNOSIS — E05.90 HYPERTHYROIDISM: ICD-10-CM

## 2022-05-06 RX ORDER — METHIMAZOLE 10 MG/1
5 TABLET ORAL EVERY OTHER DAY
Qty: 30 TABLET | Refills: 0
Start: 2022-05-06

## 2022-05-06 NOTE — TELEPHONE ENCOUNTER
Labs show TSH still elevated but improving  Free thyroxine (free T4) within normal range  Would plan to repeat thyroid labs again in 6-8 weeks (ordered) to make sure stable  Contact me sooner if any concerns develop

## 2023-05-24 DIAGNOSIS — I48.19 OTHER PERSISTENT ATRIAL FIBRILLATION (HCC): ICD-10-CM

## 2023-05-24 DIAGNOSIS — J44.9 CHRONIC OBSTRUCTIVE PULMONARY DISEASE, UNSPECIFIED (HCC): ICD-10-CM

## 2023-05-24 DIAGNOSIS — N18.9 CHRONIC KIDNEY DISEASE, UNSPECIFIED: ICD-10-CM

## 2023-05-24 DIAGNOSIS — E05.90 THYROTOXICOSIS, UNSPECIFIED WITHOUT THYROTOXIC CRISIS OR STORM: ICD-10-CM

## 2023-06-13 ENCOUNTER — HOSPITAL ENCOUNTER (OUTPATIENT)
Dept: NON INVASIVE DIAGNOSTICS | Facility: HOSPITAL | Age: 68
Discharge: HOME | End: 2023-06-13
Payer: COMMERCIAL

## 2023-06-13 VITALS
WEIGHT: 244.71 LBS | BODY MASS INDEX: 37.09 KG/M2 | DIASTOLIC BLOOD PRESSURE: 92 MMHG | SYSTOLIC BLOOD PRESSURE: 138 MMHG | HEIGHT: 68 IN | HEART RATE: 82 BPM

## 2023-06-13 DIAGNOSIS — N18.9 CHRONIC KIDNEY DISEASE, UNSPECIFIED: ICD-10-CM

## 2023-06-13 DIAGNOSIS — I48.19 OTHER PERSISTENT ATRIAL FIBRILLATION (HCC): ICD-10-CM

## 2023-06-13 DIAGNOSIS — E05.90 THYROTOXICOSIS, UNSPECIFIED WITHOUT THYROTOXIC CRISIS OR STORM: ICD-10-CM

## 2023-06-13 DIAGNOSIS — J44.9 CHRONIC OBSTRUCTIVE PULMONARY DISEASE, UNSPECIFIED (HCC): ICD-10-CM

## 2023-06-13 LAB
AORTIC ROOT: 3.4 CM
APICAL FOUR CHAMBER EJECTION FRACTION: 45 %
ASCENDING AORTA: 2.6 CM
FRACTIONAL SHORTENING: 31 % (ref 28–44)
INTERVENTRICULAR SEPTUM IN DIASTOLE (PARASTERNAL SHORT AXIS VIEW): 0.9 CM
INTERVENTRICULAR SEPTUM: 0.9 CM (ref 0.6–1.1)
LAAS-AP2: 26.6 CM2
LAAS-AP4: 21.9 CM2
LEFT ATRIUM SIZE: 5 CM
LEFT INTERNAL DIMENSION IN SYSTOLE: 2.9 CM (ref 2.1–4)
LEFT VENTRICLE DIASTOLIC VOLUME (MOD BIPLANE): 58 ML
LEFT VENTRICLE SYSTOLIC VOLUME (MOD BIPLANE): 37 ML
LEFT VENTRICULAR INTERNAL DIMENSION IN DIASTOLE: 4.2 CM (ref 3.5–6)
LEFT VENTRICULAR POSTERIOR WALL IN END DIASTOLE: 0.9 CM
LEFT VENTRICULAR STROKE VOLUME: 44 ML
LV EF: 36 %
LVSV (TEICH): 44 ML
MV E'TISSUE VEL-LAT: 12 CM/S
MV E'TISSUE VEL-SEP: 9 CM/S
RA PRESSURE ESTIMATED: 3 MMHG
RIGHT ATRIUM AREA SYSTOLE A4C: 30.7 CM2
RIGHT VENTRICLE ID DIMENSION: 3.8 CM
RV PSP: 39 MMHG
SL CV LEFT ATRIUM LENGTH A2C: 6.7 CM
SL CV LV EF: 52
SL CV PED ECHO LEFT VENTRICLE DIASTOLIC VOLUME (MOD BIPLANE) 2D: 77 ML
SL CV PED ECHO LEFT VENTRICLE SYSTOLIC VOLUME (MOD BIPLANE) 2D: 33 ML
TR MAX PG: 36 MMHG
TR PEAK VELOCITY: 3 M/S
TRICUSPID ANNULAR PLANE SYSTOLIC EXCURSION: 2.2 CM
TRICUSPID VALVE PEAK E WAVE VELOCITY: 0.16 M/S
TRICUSPID VALVE PEAK REGURGITATION VELOCITY: 3 M/S

## 2023-06-13 PROCEDURE — 93306 TTE W/DOPPLER COMPLETE: CPT

## 2023-10-04 NOTE — ANESTHESIA POSTPROCEDURE EVALUATION
Post-Op Assessment Note    CV Status:  Stable  Pain Score: 0    Pain management: adequate     Mental Status:  Alert and awake (oriented)   Hydration Status:  Euvolemic   PONV Controlled:  Controlled   Airway Patency:  Patent      Post Op Vitals Reviewed: Yes      Staff: CRNA         No complications documented      BP   105/62   Temp   97 9   Pulse  57   Resp   16   SpO2   95 Elidel Counseling: Patient may experience a mild burning sensation during topical application. Elidel is not approved in children less than 2 years of age. There have been case reports of hematologic and skin malignancies in patients using topical calcineurin inhibitors although causality is questionable.

## 2024-05-08 ENCOUNTER — HOSPITAL ENCOUNTER (OUTPATIENT)
Dept: ULTRASOUND IMAGING | Facility: HOSPITAL | Age: 69
Discharge: HOME | End: 2024-05-08
Payer: COMMERCIAL

## 2024-05-08 DIAGNOSIS — J44.9 CHRONIC OBSTRUCTIVE PULMONARY DISEASE, UNSPECIFIED COPD TYPE (HCC): ICD-10-CM

## 2024-05-08 DIAGNOSIS — Z13.6 SCREENING FOR AAA (AORTIC ABDOMINAL ANEURYSM): ICD-10-CM

## 2024-05-08 PROCEDURE — 76706 US ABDL AORTA SCREEN AAA: CPT
